# Patient Record
Sex: FEMALE | Race: WHITE | Employment: OTHER | URBAN - METROPOLITAN AREA
[De-identification: names, ages, dates, MRNs, and addresses within clinical notes are randomized per-mention and may not be internally consistent; named-entity substitution may affect disease eponyms.]

---

## 2018-10-12 DIAGNOSIS — Z12.11 SPECIAL SCREENING FOR MALIGNANT NEOPLASMS, COLON: Primary | ICD-10-CM

## 2018-10-16 ENCOUNTER — HOSPITAL ENCOUNTER (OUTPATIENT)
Age: 63
Discharge: HOME | End: 2018-10-16
Payer: COMMERCIAL

## 2018-10-16 LAB
ALBUMIN SERPL-MCNC: 4.2 G/DL (ref 3.5–5.2)
ALP BLD-CCNC: 73 U/L (ref 35–104)
ALT SERPL-CCNC: 18 U/L (ref 0–32)
ANION GAP SERPL CALCULATED.3IONS-SCNC: 11 MMOL/L (ref 7–16)
AST SERPL-CCNC: 19 U/L (ref 0–31)
BASOPHILS ABSOLUTE: 0.04 E9/L (ref 0–0.2)
BASOPHILS RELATIVE PERCENT: 0.4 % (ref 0–2)
BILIRUB SERPL-MCNC: 0.3 MG/DL (ref 0–1.2)
BUN BLDV-MCNC: 20 MG/DL (ref 8–23)
CALCIUM SERPL-MCNC: 9.3 MG/DL (ref 8.6–10.2)
CHLORIDE BLD-SCNC: 104 MMOL/L (ref 98–107)
CHOLESTEROL, TOTAL: 202 MG/DL (ref 0–199)
CO2: 26 MMOL/L (ref 22–29)
CREAT SERPL-MCNC: 0.9 MG/DL (ref 0.5–1)
EOSINOPHILS ABSOLUTE: 0.12 E9/L (ref 0.05–0.5)
EOSINOPHILS RELATIVE PERCENT: 1.3 % (ref 0–6)
GFR AFRICAN AMERICAN: >60
GFR NON-AFRICAN AMERICAN: >60 ML/MIN/1.73
GLUCOSE BLD-MCNC: 93 MG/DL (ref 74–109)
HCT VFR BLD CALC: 39.7 % (ref 34–48)
HDLC SERPL-MCNC: 46 MG/DL
HEMOGLOBIN: 13.3 G/DL (ref 11.5–15.5)
IMMATURE GRANULOCYTES #: 0.04 E9/L
IMMATURE GRANULOCYTES %: 0.4 % (ref 0–5)
LDL CHOLESTEROL CALCULATED: 137 MG/DL (ref 0–99)
LYMPHOCYTES ABSOLUTE: 1.55 E9/L (ref 1.5–4)
LYMPHOCYTES RELATIVE PERCENT: 17.2 % (ref 20–42)
MCH RBC QN AUTO: 30.6 PG (ref 26–35)
MCHC RBC AUTO-ENTMCNC: 33.5 % (ref 32–34.5)
MCV RBC AUTO: 91.5 FL (ref 80–99.9)
MONOCYTES ABSOLUTE: 0.68 E9/L (ref 0.1–0.95)
MONOCYTES RELATIVE PERCENT: 7.6 % (ref 2–12)
NEUTROPHILS ABSOLUTE: 6.56 E9/L (ref 1.8–7.3)
NEUTROPHILS RELATIVE PERCENT: 73.1 % (ref 43–80)
PDW BLD-RTO: 12.9 FL (ref 11.5–15)
PLATELET # BLD: 248 E9/L (ref 130–450)
PMV BLD AUTO: 9.7 FL (ref 7–12)
POTASSIUM SERPL-SCNC: 5.1 MMOL/L (ref 3.5–5)
RBC # BLD: 4.34 E12/L (ref 3.5–5.5)
SODIUM BLD-SCNC: 141 MMOL/L (ref 132–146)
TOTAL PROTEIN: 6.5 G/DL (ref 6.4–8.3)
TRIGL SERPL-MCNC: 93 MG/DL (ref 0–149)
TSH SERPL DL<=0.05 MIU/L-ACNC: 2.83 UIU/ML (ref 0.27–4.2)
VLDLC SERPL CALC-MCNC: 19 MG/DL
WBC # BLD: 9 E9/L (ref 4.5–11.5)

## 2018-10-16 PROCEDURE — 80061 LIPID PANEL: CPT

## 2018-10-16 PROCEDURE — 80053 COMPREHEN METABOLIC PANEL: CPT

## 2018-10-16 PROCEDURE — 84443 ASSAY THYROID STIM HORMONE: CPT

## 2018-10-16 PROCEDURE — 85025 COMPLETE CBC W/AUTO DIFF WBC: CPT

## 2018-10-16 PROCEDURE — 36415 COLL VENOUS BLD VENIPUNCTURE: CPT

## 2018-10-22 ENCOUNTER — PATIENT MESSAGE (OUTPATIENT)
Dept: FAMILY MEDICINE CLINIC | Age: 63
End: 2018-10-22

## 2018-10-22 DIAGNOSIS — Z12.11 SPECIAL SCREENING FOR MALIGNANT NEOPLASMS, COLON: ICD-10-CM

## 2018-10-22 LAB
CONTROL: NORMAL
HEMOCCULT STL QL: POSITIVE

## 2018-10-22 PROCEDURE — 82274 ASSAY TEST FOR BLOOD FECAL: CPT | Performed by: FAMILY MEDICINE

## 2018-10-23 NOTE — TELEPHONE ENCOUNTER
From: Suzie Goodrich  To: Rosalind Padilla DO  Sent: 10/22/2018 9:01 PM EDT  Subject: Test Results Question    Hello team,   I viewed the \"positive\"result of my FIT-stool test. The last two years were negative. However, I have IBS with constipation. Last weekend, 10/13/18, I had a \"bout\" with diarrhea followed by 4-5 days of constipation. I had to push like crazy to collect a sample for this test. I have a big hemorrhoid. Is it possible that my condition could have caused this result? What should I do next?  Thanks, Suzie Goodrich

## 2018-10-31 DIAGNOSIS — Z12.11 SPECIAL SCREENING FOR MALIGNANT NEOPLASMS, COLON: Primary | ICD-10-CM

## 2018-11-07 DIAGNOSIS — Z12.11 SPECIAL SCREENING FOR MALIGNANT NEOPLASMS, COLON: ICD-10-CM

## 2018-11-07 LAB
CONTROL: NORMAL
HEMOCCULT STL QL: NEGATIVE

## 2018-11-07 PROCEDURE — 82274 ASSAY TEST FOR BLOOD FECAL: CPT | Performed by: FAMILY MEDICINE

## 2018-11-21 ENCOUNTER — OFFICE VISIT (OUTPATIENT)
Dept: FAMILY MEDICINE CLINIC | Age: 63
End: 2018-11-21
Payer: COMMERCIAL

## 2018-11-21 VITALS
HEART RATE: 66 BPM | HEIGHT: 62 IN | DIASTOLIC BLOOD PRESSURE: 70 MMHG | SYSTOLIC BLOOD PRESSURE: 104 MMHG | BODY MASS INDEX: 21.9 KG/M2 | TEMPERATURE: 98.8 F | RESPIRATION RATE: 16 BRPM | OXYGEN SATURATION: 98 % | WEIGHT: 119 LBS

## 2018-11-21 DIAGNOSIS — M54.50 ACUTE BILATERAL LOW BACK PAIN WITHOUT SCIATICA: Primary | ICD-10-CM

## 2018-11-21 DIAGNOSIS — Z23 NEED FOR VACCINATION FOR ZOSTER: ICD-10-CM

## 2018-11-21 PROCEDURE — 99214 OFFICE O/P EST MOD 30 MIN: CPT | Performed by: FAMILY MEDICINE

## 2018-11-21 RX ORDER — TRIAMCINOLONE ACETONIDE 40 MG/ML
40 INJECTION, SUSPENSION INTRA-ARTICULAR; INTRAMUSCULAR ONCE
Status: COMPLETED | OUTPATIENT
Start: 2018-11-21 | End: 2018-11-21

## 2018-11-21 RX ORDER — KETOROLAC TROMETHAMINE 10 MG/1
10 TABLET, FILM COATED ORAL EVERY 6 HOURS PRN
Qty: 20 TABLET | Refills: 0 | Status: SHIPPED | OUTPATIENT
Start: 2018-11-21 | End: 2020-11-09

## 2018-11-21 RX ORDER — TIZANIDINE 2 MG/1
2 TABLET ORAL NIGHTLY PRN
Qty: 30 TABLET | Refills: 0 | Status: SHIPPED | OUTPATIENT
Start: 2018-11-21 | End: 2020-11-09

## 2018-11-21 RX ADMIN — TRIAMCINOLONE ACETONIDE 40 MG: 40 INJECTION, SUSPENSION INTRA-ARTICULAR; INTRAMUSCULAR at 10:07

## 2018-11-21 ASSESSMENT — PATIENT HEALTH QUESTIONNAIRE - PHQ9
SUM OF ALL RESPONSES TO PHQ QUESTIONS 1-9: 0
1. LITTLE INTEREST OR PLEASURE IN DOING THINGS: 0
SUM OF ALL RESPONSES TO PHQ9 QUESTIONS 1 & 2: 0
SUM OF ALL RESPONSES TO PHQ QUESTIONS 1-9: 0
2. FEELING DOWN, DEPRESSED OR HOPELESS: 0

## 2019-10-18 ENCOUNTER — HOSPITAL ENCOUNTER (OUTPATIENT)
Age: 64
Discharge: HOME | End: 2019-10-18
Payer: COMMERCIAL

## 2019-10-18 LAB
ALBUMIN SERPL-MCNC: 4.7 G/DL (ref 3.5–5.2)
ALP BLD-CCNC: 80 U/L (ref 35–104)
ALT SERPL-CCNC: 11 U/L (ref 0–32)
ANION GAP SERPL CALCULATED.3IONS-SCNC: 11 MMOL/L (ref 7–16)
AST SERPL-CCNC: 18 U/L (ref 0–31)
BASOPHILS ABSOLUTE: 0.04 E9/L (ref 0–0.2)
BASOPHILS RELATIVE PERCENT: 0.8 % (ref 0–2)
BILIRUB SERPL-MCNC: 0.4 MG/DL (ref 0–1.2)
BUN BLDV-MCNC: 24 MG/DL (ref 8–23)
CALCIUM SERPL-MCNC: 10.1 MG/DL (ref 8.6–10.2)
CHLORIDE BLD-SCNC: 104 MMOL/L (ref 98–107)
CHOLESTEROL, TOTAL: 224 MG/DL (ref 0–199)
CO2: 26 MMOL/L (ref 22–29)
CREAT SERPL-MCNC: 0.9 MG/DL (ref 0.5–1)
EOSINOPHILS ABSOLUTE: 0.08 E9/L (ref 0.05–0.5)
EOSINOPHILS RELATIVE PERCENT: 1.5 % (ref 0–6)
GFR AFRICAN AMERICAN: >60
GFR NON-AFRICAN AMERICAN: >60 ML/MIN/1.73
GLUCOSE BLD-MCNC: 93 MG/DL (ref 74–99)
HCT VFR BLD CALC: 42.5 % (ref 34–48)
HDLC SERPL-MCNC: 50 MG/DL
HEMOGLOBIN: 14.2 G/DL (ref 11.5–15.5)
IMMATURE GRANULOCYTES #: 0.01 E9/L
IMMATURE GRANULOCYTES %: 0.2 % (ref 0–5)
LDL CHOLESTEROL CALCULATED: 158 MG/DL (ref 0–99)
LYMPHOCYTES ABSOLUTE: 1.27 E9/L (ref 1.5–4)
LYMPHOCYTES RELATIVE PERCENT: 24.3 % (ref 20–42)
MCH RBC QN AUTO: 30.5 PG (ref 26–35)
MCHC RBC AUTO-ENTMCNC: 33.4 % (ref 32–34.5)
MCV RBC AUTO: 91.4 FL (ref 80–99.9)
MONOCYTES ABSOLUTE: 0.45 E9/L (ref 0.1–0.95)
MONOCYTES RELATIVE PERCENT: 8.6 % (ref 2–12)
NEUTROPHILS ABSOLUTE: 3.37 E9/L (ref 1.8–7.3)
NEUTROPHILS RELATIVE PERCENT: 64.6 % (ref 43–80)
PDW BLD-RTO: 12.8 FL (ref 11.5–15)
PLATELET # BLD: 286 E9/L (ref 130–450)
PMV BLD AUTO: 9.3 FL (ref 7–12)
POTASSIUM SERPL-SCNC: 4.5 MMOL/L (ref 3.5–5)
RBC # BLD: 4.65 E12/L (ref 3.5–5.5)
SODIUM BLD-SCNC: 141 MMOL/L (ref 132–146)
TOTAL PROTEIN: 7.5 G/DL (ref 6.4–8.3)
TRIGL SERPL-MCNC: 82 MG/DL (ref 0–149)
TSH SERPL DL<=0.05 MIU/L-ACNC: 1.99 UIU/ML (ref 0.27–4.2)
VLDLC SERPL CALC-MCNC: 16 MG/DL
WBC # BLD: 5.2 E9/L (ref 4.5–11.5)

## 2019-10-18 PROCEDURE — 36415 COLL VENOUS BLD VENIPUNCTURE: CPT

## 2019-10-18 PROCEDURE — 85025 COMPLETE CBC W/AUTO DIFF WBC: CPT

## 2019-10-18 PROCEDURE — 84443 ASSAY THYROID STIM HORMONE: CPT

## 2019-10-18 PROCEDURE — 80053 COMPREHEN METABOLIC PANEL: CPT

## 2019-10-18 PROCEDURE — 80061 LIPID PANEL: CPT

## 2019-11-13 ENCOUNTER — OFFICE VISIT (OUTPATIENT)
Dept: FAMILY MEDICINE CLINIC | Age: 64
End: 2019-11-13
Payer: COMMERCIAL

## 2019-11-13 VITALS
BODY MASS INDEX: 20.98 KG/M2 | OXYGEN SATURATION: 99 % | WEIGHT: 114 LBS | SYSTOLIC BLOOD PRESSURE: 114 MMHG | HEIGHT: 62 IN | TEMPERATURE: 98.4 F | RESPIRATION RATE: 14 BRPM | HEART RATE: 82 BPM | DIASTOLIC BLOOD PRESSURE: 70 MMHG

## 2019-11-13 DIAGNOSIS — R53.83 FATIGUE, UNSPECIFIED TYPE: Primary | ICD-10-CM

## 2019-11-13 DIAGNOSIS — F41.9 ANXIETY: ICD-10-CM

## 2019-11-13 DIAGNOSIS — Z12.11 SPECIAL SCREENING FOR MALIGNANT NEOPLASMS, COLON: ICD-10-CM

## 2019-11-13 PROCEDURE — 99214 OFFICE O/P EST MOD 30 MIN: CPT | Performed by: FAMILY MEDICINE

## 2019-11-15 DIAGNOSIS — Z12.11 SPECIAL SCREENING FOR MALIGNANT NEOPLASMS, COLON: ICD-10-CM

## 2019-11-15 LAB
CONTROL: NORMAL
HEMOCCULT STL QL: NEGATIVE

## 2019-11-15 PROCEDURE — 82274 ASSAY TEST FOR BLOOD FECAL: CPT | Performed by: FAMILY MEDICINE

## 2019-12-30 ENCOUNTER — HOSPITAL ENCOUNTER (OUTPATIENT)
Age: 64
Discharge: HOME | End: 2019-12-30
Payer: COMMERCIAL

## 2019-12-30 DIAGNOSIS — R53.83 FATIGUE, UNSPECIFIED TYPE: ICD-10-CM

## 2019-12-30 LAB
HCT VFR BLD CALC: 38.4 % (ref 34–48)
HEMOGLOBIN: 12.6 G/DL (ref 11.5–15.5)
MCH RBC QN AUTO: 30.4 PG (ref 26–35)
MCHC RBC AUTO-ENTMCNC: 32.8 % (ref 32–34.5)
MCV RBC AUTO: 92.5 FL (ref 80–99.9)
PDW BLD-RTO: 12.9 FL (ref 11.5–15)
PLATELET # BLD: 281 E9/L (ref 130–450)
PMV BLD AUTO: 9.6 FL (ref 7–12)
RBC # BLD: 4.15 E12/L (ref 3.5–5.5)
WBC # BLD: 7.5 E9/L (ref 4.5–11.5)

## 2019-12-30 PROCEDURE — 85027 COMPLETE CBC AUTOMATED: CPT

## 2019-12-30 PROCEDURE — 36415 COLL VENOUS BLD VENIPUNCTURE: CPT

## 2020-10-23 ENCOUNTER — HOSPITAL ENCOUNTER (OUTPATIENT)
Age: 65
Discharge: HOME | End: 2020-10-23
Payer: COMMERCIAL

## 2020-10-23 LAB
ALBUMIN SERPL-MCNC: 4.5 G/DL (ref 3.5–5.2)
ALP BLD-CCNC: 82 U/L (ref 35–104)
ALT SERPL-CCNC: 11 U/L (ref 0–32)
ANION GAP SERPL CALCULATED.3IONS-SCNC: 11 MMOL/L (ref 7–16)
AST SERPL-CCNC: 15 U/L (ref 0–31)
BASOPHILS ABSOLUTE: 0.04 E9/L (ref 0–0.2)
BASOPHILS RELATIVE PERCENT: 0.7 % (ref 0–2)
BILIRUB SERPL-MCNC: 0.6 MG/DL (ref 0–1.2)
BUN BLDV-MCNC: 21 MG/DL (ref 8–23)
CALCIUM SERPL-MCNC: 9.9 MG/DL (ref 8.6–10.2)
CHLORIDE BLD-SCNC: 104 MMOL/L (ref 98–107)
CHOLESTEROL, TOTAL: 218 MG/DL (ref 0–199)
CO2: 25 MMOL/L (ref 22–29)
CREAT SERPL-MCNC: 1 MG/DL (ref 0.5–1)
EOSINOPHILS ABSOLUTE: 0.04 E9/L (ref 0.05–0.5)
EOSINOPHILS RELATIVE PERCENT: 0.7 % (ref 0–6)
GFR AFRICAN AMERICAN: >60
GFR NON-AFRICAN AMERICAN: 56 ML/MIN/1.73
GLUCOSE BLD-MCNC: 95 MG/DL (ref 74–99)
HCT VFR BLD CALC: 41.9 % (ref 34–48)
HDLC SERPL-MCNC: 45 MG/DL
HEMOGLOBIN: 14 G/DL (ref 11.5–15.5)
IMMATURE GRANULOCYTES #: 0.01 E9/L
IMMATURE GRANULOCYTES %: 0.2 % (ref 0–5)
LDL CHOLESTEROL CALCULATED: 155 MG/DL (ref 0–99)
LYMPHOCYTES ABSOLUTE: 1.37 E9/L (ref 1.5–4)
LYMPHOCYTES RELATIVE PERCENT: 23.7 % (ref 20–42)
MCH RBC QN AUTO: 31.1 PG (ref 26–35)
MCHC RBC AUTO-ENTMCNC: 33.4 % (ref 32–34.5)
MCV RBC AUTO: 93.1 FL (ref 80–99.9)
MONOCYTES ABSOLUTE: 0.46 E9/L (ref 0.1–0.95)
MONOCYTES RELATIVE PERCENT: 8 % (ref 2–12)
NEUTROPHILS ABSOLUTE: 3.86 E9/L (ref 1.8–7.3)
NEUTROPHILS RELATIVE PERCENT: 66.7 % (ref 43–80)
PDW BLD-RTO: 13.5 FL (ref 11.5–15)
PLATELET # BLD: 287 E9/L (ref 130–450)
PMV BLD AUTO: 9.4 FL (ref 7–12)
POTASSIUM SERPL-SCNC: 4.4 MMOL/L (ref 3.5–5)
RBC # BLD: 4.5 E12/L (ref 3.5–5.5)
SODIUM BLD-SCNC: 140 MMOL/L (ref 132–146)
TOTAL PROTEIN: 7.2 G/DL (ref 6.4–8.3)
TRIGL SERPL-MCNC: 88 MG/DL (ref 0–149)
TSH SERPL DL<=0.05 MIU/L-ACNC: 2.06 UIU/ML (ref 0.27–4.2)
VLDLC SERPL CALC-MCNC: 18 MG/DL
WBC # BLD: 5.8 E9/L (ref 4.5–11.5)

## 2020-10-23 PROCEDURE — 85025 COMPLETE CBC W/AUTO DIFF WBC: CPT

## 2020-10-23 PROCEDURE — 84443 ASSAY THYROID STIM HORMONE: CPT

## 2020-10-23 PROCEDURE — 36415 COLL VENOUS BLD VENIPUNCTURE: CPT

## 2020-10-23 PROCEDURE — 80061 LIPID PANEL: CPT

## 2020-10-23 PROCEDURE — 80053 COMPREHEN METABOLIC PANEL: CPT

## 2020-11-09 ENCOUNTER — OFFICE VISIT (OUTPATIENT)
Dept: FAMILY MEDICINE CLINIC | Age: 65
End: 2020-11-09
Payer: COMMERCIAL

## 2020-11-09 VITALS
RESPIRATION RATE: 16 BRPM | DIASTOLIC BLOOD PRESSURE: 78 MMHG | HEART RATE: 74 BPM | OXYGEN SATURATION: 100 % | TEMPERATURE: 97.6 F | BODY MASS INDEX: 19.67 KG/M2 | WEIGHT: 111 LBS | SYSTOLIC BLOOD PRESSURE: 121 MMHG | HEIGHT: 63 IN

## 2020-11-09 PROCEDURE — 93000 ELECTROCARDIOGRAM COMPLETE: CPT | Performed by: FAMILY MEDICINE

## 2020-11-09 PROCEDURE — 99396 PREV VISIT EST AGE 40-64: CPT | Performed by: FAMILY MEDICINE

## 2020-11-09 SDOH — ECONOMIC STABILITY: FOOD INSECURITY: WITHIN THE PAST 12 MONTHS, YOU WORRIED THAT YOUR FOOD WOULD RUN OUT BEFORE YOU GOT MONEY TO BUY MORE.: NEVER TRUE

## 2020-11-09 SDOH — ECONOMIC STABILITY: FOOD INSECURITY: WITHIN THE PAST 12 MONTHS, THE FOOD YOU BOUGHT JUST DIDN'T LAST AND YOU DIDN'T HAVE MONEY TO GET MORE.: NEVER TRUE

## 2020-11-09 SDOH — ECONOMIC STABILITY: INCOME INSECURITY: HOW HARD IS IT FOR YOU TO PAY FOR THE VERY BASICS LIKE FOOD, HOUSING, MEDICAL CARE, AND HEATING?: NOT HARD AT ALL

## 2020-11-09 SDOH — ECONOMIC STABILITY: TRANSPORTATION INSECURITY
IN THE PAST 12 MONTHS, HAS LACK OF TRANSPORTATION KEPT YOU FROM MEETINGS, WORK, OR FROM GETTING THINGS NEEDED FOR DAILY LIVING?: NO

## 2020-11-09 SDOH — ECONOMIC STABILITY: TRANSPORTATION INSECURITY
IN THE PAST 12 MONTHS, HAS THE LACK OF TRANSPORTATION KEPT YOU FROM MEDICAL APPOINTMENTS OR FROM GETTING MEDICATIONS?: NO

## 2020-11-09 NOTE — PROGRESS NOTES
11/11/2020    Chief Complaint   Patient presents with    Annual Exam     wants to talk about her anxiety and panic attacks that were added in 2012. Screening Questions:    Exercise 30 minutes daily 5 times weekly:  Yes   Mammogram every 1-2 years age 36, then annually after age 48: Yes   Pap smear UTD:  Yes   Personal or family history breast, ovarian or colon cancer: Yes    Abuse/Intimate partner violence:  No   Screened for osteoporosis if 65+:  due    Specialists:  No    CIBH:  No   Fecal occult blood yearly after age 50/Colonoscopy:  Yes   Eye exam every 2-4 years, yearly if diabetic:  Yes    Dental exam:  Yes    Hearing Evaluation/Hearing Aid:  No    BMI elevated: Body mass index is 19.66 kg/m². Lu Whitley Counseled on weight loss   Depression:  Yes    Tobacco use: No  . Cessation discussed    Alcohol use:  No        Immunizations:    Immunization status:     Tetanus shot every 10 years    Flu shot yearly    Pneumovax shot once for high risk and everyone > 65    Shingles shot once for everyone > 60    Hepatitis B for high risk patients    Labs:  No results found for: EAG  Lab Results   Component Value Date    LDLCALC 155 (H) 10/23/2020      CBC:   Lab Results   Component Value Date    WBC 5.8 10/23/2020    RBC 4.50 10/23/2020    HGB 14.0 10/23/2020    HCT 41.9 10/23/2020    MCV 93.1 10/23/2020    MCH 31.1 10/23/2020    MCHC 33.4 10/23/2020    RDW 13.5 10/23/2020     10/23/2020    MPV 9.4 10/23/2020         Patient's past medical, surgical, social and/or family history reviewed, updated in chart, and are non-contributory (unless otherwise stated). Medications and allergies also reviewed and updated in chart.     ROS  Review of Systems - General ROS: negative for - chills, fatigue, fever, night sweats, sleep disturbance, weight gain or weight loss  Psychological ROS: negative for - anxiety, behavioral disorder, depression, hallucinations, irritability, memory difficulties, mood swings, sleep disturbances or suicidal ideation  ENT ROS: negative for - epistaxis, headaches, hearing change, nasal congestion, nasal discharge, nasal polyps, sinus pain, tinnitus, vertigo or visual changes  Hematological and Lymphatic ROS: negative for - bleeding problems, blood clots, fatigue or swollen lymph nodes  Respiratory ROS: negative for - cough, orthopnea, shortness of breath, sputum changes, tachypnea or wheezing  Cardiovascular ROS: negative for - chest pain, dyspnea on exertion, irregular heartbeat, loss of consciousness, palpitations, paroxysmal nocturnal dyspnea or rapid heart rate  Gastrointestinal ROS: negative for - abdominal pain, blood in stools, change in bowel habits, constipation, diarrhea, gas/bloating, heartburn or nausea/vomiting  Musculoskeletal ROS: negative for - joint pain, joint stiffness, joint swelling or muscle, back pain, bowel or bladder incontinence  Neurological ROS: negative for - behavioral changes, confusion, dizziness, headaches, memory loss, numbness/tingling, seizures or speech problems, weakness  Dermatological ROS: negative for - dry skin, mole changes, nail changes, pruritus, rash or skin lesion changes    Physical Exam  /78   Pulse 74   Temp 97.6 °F (36.4 °C) (Temporal)   Resp 16   Ht 5' 3\" (1.6 m)   Wt 111 lb (50.3 kg)   SpO2 100%   BMI 19.66 kg/m²   Wt Readings from Last 3 Encounters:   11/09/20 111 lb (50.3 kg)   11/13/19 114 lb (51.7 kg)   11/21/18 119 lb (54 kg)     Temp Readings from Last 3 Encounters:   11/09/20 97.6 °F (36.4 °C) (Temporal)   11/13/19 98.4 °F (36.9 °C)   11/21/18 98.8 °F (37.1 °C)     BP Readings from Last 3 Encounters:   11/09/20 121/78   11/13/19 114/70   11/21/18 104/70     Pulse Readings from Last 3 Encounters:   11/09/20 74   11/13/19 82   11/21/18 66       General appearance: alert, well appearing, and in no distress, oriented to person, place, and time and normal appearing weight.     CVS exam: normal rate, regular rhythm, normal S1, S2, no murmurs, rubs, clicks or gallops. Radial pulses 2+ bilateral.  PT/DP pulse 2+ bilat. No C/C/E    Chest: clear to auscultation, no wheezes, rales or rhonchi, symmetric air entry. Abdomen: Soft, non-tender, non-distended, positive BS in all 4 quadrants    Extremities:Dorsalis pedis pulses palpated bilaterally, no clubbing, cyanosis, edema or erythema, Sensory exam of the foot is normal, tested with the monofilament. Good pulses, no lesions or ulcers, good peripheral pulses. SKIN: no lesions, jaundice, petechiae, pallor, cyanosis, ecchymosis    NEURO: gross motor exam normal by observation, gait normal    Mental status - alert, oriented to person, place, and time, normal mood, behavior, speech, dress, motor activity, and thought processes      Assessment/Plan  Elida Aquino was seen today for annual exam.    Diagnoses and all orders for this visit:    Annual physical exam  -     EKG 12 Lead    Screening for malignant neoplasm of colon  -     POCT Fit Test; Future    Mixed hyperlipidemia  -     EKG 12 Lead        Return in about 1 year (around 11/9/2021), or if symptoms worsen or fail to improve. The 10-year ASCVD risk score (Dian He., et al., 2013) is: 4.7%    Values used to calculate the score:      Age: 63 years      Sex: Female      Is Non- : No      Diabetic: No      Tobacco smoker: No      Systolic Blood Pressure: 683 mmHg      Is BP treated: No      HDL Cholesterol: 45 mg/dL      Total Cholesterol: 218 mg/dL  Call or go to ED immediately if symptoms worsen or persist.    Educational materials and/or home exercises printed for patient's review and were included in patient instructions on his/her After Visit Summary and given to patient at the end of visit. Counseled regarding above diagnosis, including possible risks and complications,  especially if left uncontrolled.     Counseled regarding the possible side effects, risks, benefits and alternatives to treatment; patient and/or guardian verbalizes understanding, agrees, feels comfortable with and wishes to proceed with above treatment plan. Advised patient to call with any new medication issues, and read all Rx info from pharmacy to assure aware of all possible risks and side effects of medication before taking. Reviewed age and gender appropriate health screening exams and vaccinations. Advised patient regarding importance of keeping up with recommended health maintenance and to schedule as soon as possible if overdue, as this is important in assessing for undiagnosed pathology, especially cancer, as well as protecting against potentially harmful/life threatening disease. Patient and/or guardian verbalizes understanding and agrees with above counseling, assessment and plan. All questions answered.

## 2020-11-11 LAB
CONTROL: NORMAL
HEMOCCULT STL QL: POSITIVE

## 2020-11-11 PROCEDURE — 82274 ASSAY TEST FOR BLOOD FECAL: CPT | Performed by: FAMILY MEDICINE

## 2021-03-05 ENCOUNTER — APPOINTMENT (OUTPATIENT)
Dept: GENERAL RADIOLOGY | Age: 66
End: 2021-03-05
Payer: COMMERCIAL

## 2021-03-05 ENCOUNTER — TELEPHONE (OUTPATIENT)
Dept: ADMINISTRATIVE | Age: 66
End: 2021-03-05

## 2021-03-05 ENCOUNTER — HOSPITAL ENCOUNTER (EMERGENCY)
Age: 66
Discharge: HOME | End: 2021-03-05
Attending: EMERGENCY MEDICINE
Payer: COMMERCIAL

## 2021-03-05 VITALS
HEIGHT: 63 IN | DIASTOLIC BLOOD PRESSURE: 70 MMHG | HEART RATE: 97 BPM | BODY MASS INDEX: 19.67 KG/M2 | SYSTOLIC BLOOD PRESSURE: 142 MMHG | WEIGHT: 111 LBS | RESPIRATION RATE: 16 BRPM | TEMPERATURE: 96.9 F

## 2021-03-05 DIAGNOSIS — S52.502A CLOSED FRACTURE OF DISTAL END OF LEFT RADIUS, UNSPECIFIED FRACTURE MORPHOLOGY, INITIAL ENCOUNTER: Primary | ICD-10-CM

## 2021-03-05 DIAGNOSIS — S82.832A CLOSED FRACTURE OF PROXIMAL END OF LEFT FIBULA, UNSPECIFIED FRACTURE MORPHOLOGY, INITIAL ENCOUNTER: ICD-10-CM

## 2021-03-05 PROCEDURE — 73610 X-RAY EXAM OF ANKLE: CPT

## 2021-03-05 PROCEDURE — 73110 X-RAY EXAM OF WRIST: CPT

## 2021-03-05 PROCEDURE — 99283 EMERGENCY DEPT VISIT LOW MDM: CPT

## 2021-03-05 PROCEDURE — 29125 APPL SHORT ARM SPLINT STATIC: CPT

## 2021-03-05 PROCEDURE — 73560 X-RAY EXAM OF KNEE 1 OR 2: CPT

## 2021-03-05 RX ORDER — HYDROCODONE BITARTRATE AND ACETAMINOPHEN 5; 325 MG/1; MG/1
1 TABLET ORAL EVERY 6 HOURS PRN
Qty: 12 TABLET | Refills: 0 | Status: SHIPPED | OUTPATIENT
Start: 2021-03-05 | End: 2021-03-08

## 2021-03-05 ASSESSMENT — PAIN DESCRIPTION - ONSET: ONSET: SUDDEN

## 2021-03-05 ASSESSMENT — PAIN DESCRIPTION - PAIN TYPE: TYPE: ACUTE PAIN

## 2021-03-05 ASSESSMENT — PAIN SCALES - GENERAL: PAINLEVEL_OUTOF10: 5

## 2021-03-05 NOTE — TELEPHONE ENCOUNTER
called trying to get an appt. Virgilio Robertson missed the last step and fell yesterday, injured her wrist and is now having muscular issues with the calf to the ankle. There are only [facility] appt available next week, and they need in office only.  Please return the call to patient [phone]

## 2021-03-05 NOTE — ED PROVIDER NOTES
61-year-old female presenting with a fall that occurred at home. This was yesterday, she missed the last step going downstairs. She fell and has pain to the left wrist, left knee, bilateral ankles. Did not hit her head or lose consciousness. Is awake, alert, oriented x4 currently. Not in any distress now. Is able to walk on her ankles and her knee though she does feel the pain to the knee and the left wrist.  This was a sudden fall, is been persistent achiness since yesterday, mild severity, 1 day duration, associated with a fall down the step. Family History   Problem Relation Age of Onset    High Cholesterol Mother     Cancer Maternal Uncle         colon    Diabetes Maternal Grandmother     High Blood Pressure Maternal Grandmother     Cancer Maternal Uncle         colon     Past Surgical History:   Procedure Laterality Date    CATARACT REMOVAL  07/17/11    also had scar tissue removed at this time from prev retinal surgeries    LEEP      TONSILLECTOMY AND ADENOIDECTOMY         Review of Systems   Constitutional: Negative for chills and fever. Musculoskeletal:        Left knee pain, left wrist pain, bilateral ankle pain   All other systems reviewed and are negative. Physical Exam  Constitutional:       General: She is not in acute distress. Appearance: She is well-developed. HENT:      Head: Normocephalic and atraumatic. Eyes:      Conjunctiva/sclera: Conjunctivae normal.      Pupils: Pupils are equal, round, and reactive to light. Neck:      Musculoskeletal: Normal range of motion. Thyroid: No thyromegaly. Cardiovascular:      Rate and Rhythm: Normal rate and regular rhythm. Pulmonary:      Effort: Pulmonary effort is normal. No respiratory distress. Breath sounds: Normal breath sounds. Abdominal:      General: There is no distension. Palpations: Abdomen is soft. Tenderness: There is no abdominal tenderness. There is no guarding or rebound. ankle at this time. RECOMMENDATION:   In the setting of trauma, if there is persistent symptoms and physical exam   warrants a repeat radiograph in 10-14 days could be considered as occult   fractures may not be evident on initial imaging evaluation. XR ANKLE RIGHT (MIN 3 VIEWS)   Final Result   Age-indeterminate small chip fracture along the dorsal aspect of the anterior   talus. ------------------------- NURSING NOTES AND VITALS REVIEWED ---------------------------  Date / Time Roomed:  3/5/2021  2:15 PM  ED Bed Assignment:  15/15    The nursing notes within the ED encounter and vital signs as below have been reviewed. BP (!) 142/70   Pulse 97   Temp 96.9 °F (36.1 °C) (Infrared)   Resp 16   Ht 5' 3\" (1.6 m)   Wt 111 lb (50.3 kg)   BMI 19.66 kg/m²   Oxygen Saturation Interpretation: Normal      ------------------------------------------ PROGRESS NOTES ------------------------------------------  I have spoken with the patient and discussed todays results, in addition to providing specific details for the plan of care and counseling regarding the diagnosis and prognosis. Their questions are answered at this time and they are agreeable with the plan. I discussed at length with them reasons for immediate return here for re evaluation. They will followup with primary care by calling their office tomorrow. Patient updated with the results of her x-rays. We included the possible prior avulsion fracture, undetermined time, on the right ankle as well. Patient has no significant tenderness or pain there. She understands to follow-up with orthopedic doctor. Her  is previously is Dr. Angelique Frankel and so she will be followed up by Dr. Angelique Frankel. They can use any orthopedic surgeon they wish. Currently awake calm alert, oriented, no acute distress.   Able ambulate with the crutches and the immobilizer and splint.  --------------------------------- ADDITIONAL PROVIDER NOTES ---------------------------------  At this time the patient is without objective evidence of an acute process requiring hospitalization or inpatient management. They have remained hemodynamically stable throughout their entire ED visit and are stable for discharge with outpatient follow-up. The plan has been discussed in detail and they are aware of the specific conditions for emergent return, as well as the importance of follow-up. New Prescriptions    HYDROCODONE-ACETAMINOPHEN (NORCO) 5-325 MG PER TABLET    Take 1 tablet by mouth every 6 hours as needed for Pain for up to 3 days. Diagnosis:  1. Closed fracture of distal end of left radius, unspecified fracture morphology, initial encounter    2. Closed fracture of proximal end of left fibula, unspecified fracture morphology, initial encounter        Disposition:  Patient's disposition: Discharge to home  Patient's condition is stable.               Christiane Townsend DO  03/05/21 1546

## 2021-03-09 ENCOUNTER — OFFICE VISIT (OUTPATIENT)
Dept: FAMILY MEDICINE CLINIC | Age: 66
End: 2021-03-09
Payer: COMMERCIAL

## 2021-03-09 VITALS
OXYGEN SATURATION: 99 % | DIASTOLIC BLOOD PRESSURE: 64 MMHG | RESPIRATION RATE: 16 BRPM | TEMPERATURE: 97.6 F | WEIGHT: 119 LBS | HEIGHT: 63 IN | HEART RATE: 91 BPM | SYSTOLIC BLOOD PRESSURE: 100 MMHG | BODY MASS INDEX: 21.09 KG/M2

## 2021-03-09 DIAGNOSIS — K58.2 IRRITABLE BOWEL SYNDROME WITH BOTH CONSTIPATION AND DIARRHEA: ICD-10-CM

## 2021-03-09 DIAGNOSIS — S62.102A CLOSED FRACTURE OF LEFT WRIST, INITIAL ENCOUNTER: ICD-10-CM

## 2021-03-09 DIAGNOSIS — S82.832A CLOSED FRACTURE OF PROXIMAL END OF LEFT FIBULA, UNSPECIFIED FRACTURE MORPHOLOGY, INITIAL ENCOUNTER: Primary | ICD-10-CM

## 2021-03-09 PROCEDURE — 99214 OFFICE O/P EST MOD 30 MIN: CPT | Performed by: FAMILY MEDICINE

## 2021-03-09 RX ORDER — HYOSCYAMINE SULFATE 0.12 MG/1
1 TABLET SUBLINGUAL EVERY 4 HOURS
Qty: 120 EACH | Refills: 0 | Status: SHIPPED | OUTPATIENT
Start: 2021-03-09

## 2021-03-09 NOTE — PROGRESS NOTES
3/9/2021    Chief Complaint   Patient presents with    Fall     ER follow up fractures        HPI    Wood Guerrero is a 61 y.o. patient that presents today for:    Reida Seller on 3/5/21. ER note reviewed. Wrist and possible fibular fracture. Are is wrapped and brace on LLE. Is only taking APAP for pain. Pain is much improved. Would like to see Dr. Negrito Figueroa. IBS is flaring. SPasm in colon. Does feel like she is in need of help. Levsin has helped in the past.     Patient's past medical, surgical, social and/or family history reviewed, updated in chart, and are non-contributory (unless otherwise stated). Medications and allergies also reviewed and updated in chart. ROS negative unless otherwise specified    Physical Exam  Temp Readings from Last 3 Encounters:   03/09/21 97.6 °F (36.4 °C)   03/05/21 96.9 °F (36.1 °C) (Infrared)   11/09/20 97.6 °F (36.4 °C) (Temporal)     Wt Readings from Last 3 Encounters:   03/09/21 119 lb (54 kg)   03/05/21 111 lb (50.3 kg)   11/09/20 111 lb (50.3 kg)     BP Readings from Last 3 Encounters:   03/09/21 100/64   03/05/21 (!) 142/70   11/09/20 121/78     Pulse Readings from Last 3 Encounters:   03/09/21 91   03/05/21 97   11/09/20 74       General appearance: alert, well appearing, and in no distress, oriented to person, place, and time and normal appearing weight. CVS exam: normal rate, regular rhythm, normal S1, S2, no murmurs, rubs, clicks or gallops. Radial pulses 2+ bilateral.  PT/DP pulse 2+ bilat. No C/C/E    Chest: clear to auscultation, no wheezes, rales or rhonchi, symmetric air entry.      Abdomen: Soft, non-tender, non-distended, positive BS in all 4 quadrants    Extremities:Dorsalis pedis pulses palpated bilaterally, no clubbing, cyanosis, edema or erythema, left arm and leg are wrapped    SKIN: no lesions, jaundice, petechiae, pallor, cyanosis, ecchymosis    NEURO: gross motor exam normal by observation, gait normal    Mental status - alert, oriented to person, place, and time, normal mood, behavior, speech, dress, motor activity, and thought processes      Assessment/Plan  Ingrid Joseph was seen today for fall. Diagnoses and all orders for this visit:    Closed fracture of proximal end of left fibula, unspecified fracture morphology, initial encounter  -     Jono Jacques DO, Orthopaedics, [address]    Closed fracture of left wrist, initial encounter  -     Medardo Ledezma DO, Orthopaedics, [address]    Irritable bowel syndrome with both constipation and diarrhea  -     START: Hyoscyamine Sulfate SL (LEVSIN/SL) 0.125 MG SUBL; Place 1 tablet under the tongue every 4 hours          Return if symptoms worsen or fail to improve. Carrie Dumont DO    Call or go to ED immediately if symptoms worsen or persist.    Educational materials and/or home exercises printed for patient's review and were included in patient instructions on his/her After Visit Summary and given to patient at the end of visit. Counseled regarding above diagnosis, including possible risks and complications,  especially if left uncontrolled. Counseled regarding the possible side effects, risks, benefits and alternatives to treatment; patient and/or guardian verbalizes understanding, agrees, feels comfortable with and wishes to proceed with above treatment plan. Advised patient to call with any new medication issues, and read all Rx info from pharmacy to assure aware of all possible risks and side effects of medication before taking. Reviewed age and gender appropriate health screening exams and vaccinations. Advised patient regarding importance of keeping up with recommended health maintenance and to schedule as soon as possible if overdue, as this is important in assessing for undiagnosed pathology, especially cancer, as well as protecting against potentially harmful/life threatening disease.       Patient and/or guardian verbalizes understanding and agrees with above counseling, assessment and plan. All questions answered.

## 2021-03-12 DIAGNOSIS — S82.832A CLOSED FRACTURE OF PROXIMAL END OF LEFT FIBULA, UNSPECIFIED FRACTURE MORPHOLOGY, INITIAL ENCOUNTER: Primary | ICD-10-CM

## 2021-03-12 DIAGNOSIS — S52.502A CLOSED FRACTURE OF DISTAL END OF LEFT RADIUS, UNSPECIFIED FRACTURE MORPHOLOGY, INITIAL ENCOUNTER: ICD-10-CM

## 2021-03-18 ENCOUNTER — OFFICE VISIT (OUTPATIENT)
Dept: ORTHOPEDIC SURGERY | Age: 66
End: 2021-03-18
Payer: COMMERCIAL

## 2021-03-18 ENCOUNTER — HOSPITAL ENCOUNTER (OUTPATIENT)
Dept: GENERAL RADIOLOGY | Age: 66
Discharge: HOME | End: 2021-03-20
Payer: COMMERCIAL

## 2021-03-18 VITALS — TEMPERATURE: 98.5 F

## 2021-03-18 DIAGNOSIS — S52.502A CLOSED FRACTURE OF DISTAL END OF LEFT RADIUS, UNSPECIFIED FRACTURE MORPHOLOGY, INITIAL ENCOUNTER: ICD-10-CM

## 2021-03-18 DIAGNOSIS — S82.832A CLOSED FRACTURE OF PROXIMAL END OF LEFT FIBULA, UNSPECIFIED FRACTURE MORPHOLOGY, INITIAL ENCOUNTER: ICD-10-CM

## 2021-03-18 DIAGNOSIS — S82.832A CLOSED FRACTURE OF PROXIMAL END OF LEFT FIBULA, UNSPECIFIED FRACTURE MORPHOLOGY, INITIAL ENCOUNTER: Primary | ICD-10-CM

## 2021-03-18 PROCEDURE — 25600 CLTX DST RDL FX/EPHYS SEP WO: CPT | Performed by: ORTHOPAEDIC SURGERY

## 2021-03-18 PROCEDURE — 73110 X-RAY EXAM OF WRIST: CPT

## 2021-03-18 PROCEDURE — 29075 APPL CST ELBW FNGR SHORT ARM: CPT | Performed by: ORTHOPAEDIC SURGERY

## 2021-03-18 PROCEDURE — 27780 TREATMENT OF FIBULA FRACTURE: CPT | Performed by: ORTHOPAEDIC SURGERY

## 2021-03-18 PROCEDURE — 99204 OFFICE O/P NEW MOD 45 MIN: CPT | Performed by: ORTHOPAEDIC SURGERY

## 2021-03-18 PROCEDURE — 73560 X-RAY EXAM OF KNEE 1 OR 2: CPT

## 2021-03-18 PROCEDURE — 99202 OFFICE O/P NEW SF 15 MIN: CPT | Performed by: ORTHOPAEDIC SURGERY

## 2021-03-18 RX ORDER — ACETAMINOPHEN 325 MG/1
650 TABLET ORAL EVERY 6 HOURS PRN
COMMUNITY

## 2021-03-22 NOTE — PROGRESS NOTES
Orthopaedic New Patient Note        Gunjan Griffin is a 61 y.o. female, her YOB: 1955 with the following history as recorded in [facility]:    Patient Active Problem List    Diagnosis Date Noted    MDD (major depressive disorder) 12/06/2012    Retinal detachment with retinal defect 11/29/2011     Current Outpatient Medications   Medication Sig Dispense Refill    acetaminophen (TYLENOL) 325 MG tablet Take 650 mg by mouth every 6 hours as needed for Pain      Hyoscyamine Sulfate SL (LEVSIN/SL) 0.125 MG SUBL Place 1 tablet under the tongue every 4 hours (Patient taking differently: Place 1 tablet under the tongue every 4 hours as needed ) 120 each 0    PARoxetine (PAXIL) 10 MG tablet Take 5 mg by mouth every morning        No current facility-administered medications for this visit. Allergies: Xanax xr [alprazolam er]  Past Medical History:   Diagnosis Date    Abnormal Pap smear of cervix     Anxiety     Detached retina, right 08/17/2010,10/29/10    surgery, Dr. Lyubov Schilling [facility]      Past Surgical History:   Procedure Laterality Date    CATARACT REMOVAL  07/17/11    also had scar tissue removed at this time from prev retinal surgeries    LEEP      TONSILLECTOMY AND ADENOIDECTOMY       Family History   Problem Relation Age of Onset    High Cholesterol Mother     Cancer Maternal Uncle         colon    Diabetes Maternal Grandmother     High Blood Pressure Maternal Grandmother     Cancer Maternal Uncle         colon     Social History     Tobacco Use    Smoking status: Never Smoker    Smokeless tobacco: Never Used   Substance Use Topics    Alcohol use: No                           Review of Systems   Constitutional: Negative for fever and chills. HENT: Negative for ear pain, sore throat and sinus pressure. Eyes: Negative for pain, discharge and redness. Respiratory: Negative for cough, shortness of breath and wheezing. Cardiovascular: Negative for chest pain.    Gastrointestinal: Negative for nausea, vomiting, diarrhea and abdominal distention. Genitourinary: Negative for dysuria and frequency. Musculoskeletal: Negative for back pain and arthralgias. All other systems reviewed and negative. CC: Left wrist and knee pain    S: Patrick Castro is a 61 y.o. who is here today for initial evaluation for her left wrist and left knee. DOI: 3/4/21, JORGE A: States she missed a step and landed on the left side hitting the knee and landing on the wrist.  Was seen in the ER date after injury diagnosed with left distal radius and proximal fibular fractures. She subsequently follow-up with her primary care physician and was referred here for definitive management. Patient has been in an Ace bandage and supportive brace for the knee. She has been able to bear weight through the knee without much discomfort or difficulty while using the brace. She has been provisionally immobilized for the left wrist.  Denies any other regions of pain at this time. Denies any numbness or tingling. Physical Exam  Temp 98.5 °F (36.9 °C) (Oral)   O:   CONSTITUTIONAL: awake, alert, cooperative, no apparent distress, and appears stated age  EYES: Lids and lashes normal, pupils equal, round and reactive to light, extra ocular muscles intact, sclera clear, conjunctiva normal  ENT: Normocephalic, without obvious abnormality, atraumatic, external ears without lesions, oral pharynx with moist mucus membranes  NECK: Trachea midline, skin normal  LUNGS: No increased work of breathing, good air exchange  CARDIOVASCULAR: 2+ pulses throughout and capillary Refill less than 2 seconds  ABDOMEN: soft, non-distended, non-tender and no rebound tenderness or guarding  NEUROLOGIC: Awake, alert, oriented to name, place and time. Cranial nerves II-XII are grossly intact. Motor is 5 out of 5 bilaterally. Sensory is intact.    Left Upper Extremity Exam:  Splint removed, underlying skin is intact  There is ecchymosis throughout the wrist There is increased sclerosis within the head of the fibula, likely   representing healing fracture.  No interval change in alignment.  No   significant soft tissue edema.           Impression   Ongoing, healing fracture of fibular head. Narrative   EXAMINATION:   3 XRAY VIEWS OF THE LEFT WRIST       3/18/2021 10:34 am       COMPARISON:   Wrist radiograph March 5, 2021       HISTORY:   ORDERING SYSTEM PROVIDED HISTORY: Closed fracture of distal end of left   radius, unspecified fracture morphology, initial encounter   TECHNOLOGIST PROVIDED HISTORY:   Reason for exam:->chronic fx   What reading provider will be dictating this exam?->CRC       FINDINGS:   There is ongoing, healing fracture of the distal radial metaphysis.  No   interval change in alignment.  Distal radioulnar articulation is intact.  The   radiocarpal articulation is intact.           Impression   Ongoing, healing fracture of distal radius.  No change in alignment.             ASSESSMENT:    ICD-10-CM    1. Closed fracture of proximal end of left fibula, unspecified fracture morphology, initial encounter  B64.295I    2. Closed fracture of distal end of left radius, unspecified fracture morphology, initial encounter  S52.502A        PLAN:   Had lengthy discussion with patient regarding their diagnosis, typical prognosis, and expected outcomes. We reviewed the possible complications from the injury itself despite treatment chosen. We also discussed treatment options including nonoperative managements versus surgical management, along with risks and benefits of each. Patient in agreement and has elected for nonoperative management for her fractures.   Discussed with patient she can wean out of her knee brace but should continue with this when she is up and ambulating, she can be weightbearing as tolerated to the left lower extremity  Patient placed in the short arm cast today  Would like to see her back in office in 2-3 weeks for repeat evaluation, anticipate transitioning her to removable Velcro brace at that time for her wrist    Electronically Signed By  Mirela Tsang DO  3/18/21    NOTE: This report was transcribed using voice recognition software.  Every effort was made to ensure accuracy; however, inadvertent computerized transcription errors may be present

## 2021-04-09 DIAGNOSIS — S82.832A CLOSED FRACTURE OF PROXIMAL END OF LEFT FIBULA, UNSPECIFIED FRACTURE MORPHOLOGY, INITIAL ENCOUNTER: Primary | ICD-10-CM

## 2021-04-09 DIAGNOSIS — S52.502A CLOSED FRACTURE OF DISTAL END OF LEFT RADIUS, UNSPECIFIED FRACTURE MORPHOLOGY, INITIAL ENCOUNTER: ICD-10-CM

## 2021-04-15 ENCOUNTER — HOSPITAL ENCOUNTER (OUTPATIENT)
Dept: GENERAL RADIOLOGY | Age: 66
Discharge: HOME | End: 2021-04-17
Payer: COMMERCIAL

## 2021-04-15 ENCOUNTER — OFFICE VISIT (OUTPATIENT)
Dept: ORTHOPEDIC SURGERY | Age: 66
End: 2021-04-15
Payer: COMMERCIAL

## 2021-04-15 VITALS — TEMPERATURE: 97.8 F

## 2021-04-15 DIAGNOSIS — S82.832D CLOSED FRACTURE OF PROXIMAL END OF LEFT FIBULA WITH ROUTINE HEALING, UNSPECIFIED FRACTURE MORPHOLOGY, SUBSEQUENT ENCOUNTER: Primary | ICD-10-CM

## 2021-04-15 DIAGNOSIS — S82.832A CLOSED FRACTURE OF PROXIMAL END OF LEFT FIBULA, UNSPECIFIED FRACTURE MORPHOLOGY, INITIAL ENCOUNTER: ICD-10-CM

## 2021-04-15 DIAGNOSIS — S52.502D CLOSED FRACTURE OF DISTAL END OF LEFT RADIUS WITH ROUTINE HEALING, UNSPECIFIED FRACTURE MORPHOLOGY, SUBSEQUENT ENCOUNTER: ICD-10-CM

## 2021-04-15 DIAGNOSIS — S52.502A CLOSED FRACTURE OF DISTAL END OF LEFT RADIUS, UNSPECIFIED FRACTURE MORPHOLOGY, INITIAL ENCOUNTER: ICD-10-CM

## 2021-04-15 PROCEDURE — 73110 X-RAY EXAM OF WRIST: CPT

## 2021-04-15 PROCEDURE — 99212 OFFICE O/P EST SF 10 MIN: CPT | Performed by: PHYSICIAN ASSISTANT

## 2021-04-15 PROCEDURE — L3809 WHFO W/O JOINTS PRE OTS: HCPCS | Performed by: PHYSICIAN ASSISTANT

## 2021-04-15 PROCEDURE — 73560 X-RAY EXAM OF KNEE 1 OR 2: CPT

## 2021-04-15 PROCEDURE — 99024 POSTOP FOLLOW-UP VISIT: CPT | Performed by: PHYSICIAN ASSISTANT

## 2021-04-15 NOTE — PROGRESS NOTES
Chief Complaint   Patient presents with    Arm Injury     left distal radius fx    Leg Injury     left fib fracture. SUBJECTIVE: Chino Hernández is an 61 y.o. female who is following up on nonoperative management of left distal radius and left proximal fibula fracture, DOI 3/4/21, patient 6 weeks from DOI. Essentially immobilized this entire time to the left wrist. Patient states that knee pain significantly improved, ROM better, bruising has resolved. Patient reports increased pain and soreness to the left wrist now that cast is removed. Patient's largest issue is of hand and wrist stiffness. Denies any new N/T, denies any new falls or injuries to exacerbate symptoms. Patient has been increasing her activity at home including yardwork. Review of Systems -   General ROS: negative for - chills, fatigue, fever or night sweats  Respiratory ROS: no cough, shortness of breath, or wheezing  Cardiovascular ROS: no chest pain or dyspnea on exertion  Gastrointestinal ROS: no abdominal pain, change in bowel habits, or black or bloody stools  Genitourinary: no hematuria, dysuria, or incontinence   Musculoskeletal ROS:see above  Neurological ROS: no TIA or stroke symptoms       OBJECTIVE:   Alert and oriented X 3, no acute distress, respirations easy and unlabored with no audible wheezes, skin warm and dry, speech and dress appropriate for noted age, affect euthymic.     Extremity:  Left Upper Extremity  Skin is clean dry and intact without signs of breakdown from cast  Healing ecchymosis noted  no edema noted  Radial pulse palpable, fingers warm with BCR  Flex/extension intact to wrist, thumb and fingers   Finger opposition intact  Finger adduction/abduction intact  Finger crossover intact  Patient is able to make a full concentric fist  Subjectively states sensation intact to Median Nerve, Ulnar Nerve and Radial Nerve distribution  TTP about the distal radius    Left Lower Extremity  · Skin is intact circumferentially  · +TTP mildly about the proximal fibula  · Compartments soft and compressible  · Full AROM of the knee  · Palpable dorsalis pedis and posterior tibialis pulse, brisk cap refill to toes, foot warm and perfused  · Sensation intact to light touch in sural/deep peroneal/superficial peroneal/saphenous/posterior tibial nerve distributions to foot/ankle  · Demonstrates active ankle plantar/dorsiflexion/great toe extension  · Ambulatory in clinic without assistive device and without antalgic gait       XR: 4/15/21 3V of the left wrist demonstrates stable alignment of distal radius fracture with interval healing. No other acute findings  AP/Lateral views of the left knee redemonstrates proximal fibular neck fracture without interval change in alignment. Temp 97.8 °F (36.6 °C) (Oral)     ASSESSMENT:     Diagnosis Orders   1. Closed fracture of proximal end of left fibula with routine healing, unspecified fracture morphology, subsequent encounter  External Referral To Physical Therapy    External Referral To Occupational Therapy   2.  Closed fracture of distal end of left radius with routine healing, unspecified fracture morphology, subsequent encounter  External Referral To Physical Therapy    External Referral To Occupational Therapy       PLAN:  Cast removed from left wrist  Still would like you to protect the weightbearing, no more than 1-2 lbs with the left wrist/hand while in the brace until therapy advances    Brace to be worn at all times except for therapy/hygiene/hot or cold therapy    Continue Weightbearing as tolerated on left lower extremity-- ok to increase your activity as you tolerate    Therapy for the wrist and the knee ordered today    OK to use heat before activity and ice after    Tylenol/ibuprofen as needed for soreness    Range of motion to the wrist will improve gradually, if will take time for it to be close to the right wrist range of motion      Electronically signed by Mary Menezes FRANKIE Scales on 4/15/2021 at 2:59 PM  Note: This report was completed using [employer] voiced recognition software. Every effort has been made to ensure accuracy; however, inadvertent computerized transcription errors may be present.

## 2021-04-15 NOTE — PROGRESS NOTES
Maria Esther Garza is a 61 y.o. female who presents for follow up of     1. Closed fracture of proximal end of left fibula, unspecified fracture morphology, initial encounter  S82.612L     2. Closed fracture of distal end of left radius, unspecified fracture morphology, initial encounter          SURGEON: Dr. Volodymyr Aponte DO  Date of Injury/Surgery: 03/05/21  Date last seen in office: 03/18/21    Symptoms: better  New complaints: n/a    Cast/Splint, Brace, or Dressings: Clean, dry and intact, Well fitting and Taken down for visit    Weightbearing: WBAT LLE, NWB LUE      Assistive device No Device  Participating in therapy (location if yes)?  No, requesting orders

## 2021-04-15 NOTE — PATIENT INSTRUCTIONS
Cast removed from left wrist  Still would like you to protect the weightbearing, no more than 1-2 lbs with the left wrist/hand while in the brace until therapy advances    Brace to be worn at all times except for therapy/hygiene/hot or cold therapy    Continue Weightbearing as tolerated on left lower extremity-- ok to increase your activity as you tolerate    Therapy for the wrist and the knee ordered today    OK to use heat before activity and ice after    Tylenol/ibuprofen as needed for soreness    Range of motion to the wrist will improve gradually, if will take time for it to be close to the right wrist range of motion

## 2021-05-27 ENCOUNTER — OFFICE VISIT (OUTPATIENT)
Dept: ORTHOPEDIC SURGERY | Age: 66
End: 2021-05-27
Payer: COMMERCIAL

## 2021-05-27 ENCOUNTER — HOSPITAL ENCOUNTER (OUTPATIENT)
Dept: GENERAL RADIOLOGY | Age: 66
Discharge: HOME | End: 2021-05-29
Payer: COMMERCIAL

## 2021-05-27 VITALS — TEMPERATURE: 97.2 F

## 2021-05-27 DIAGNOSIS — S52.502D CLOSED FRACTURE OF DISTAL END OF LEFT RADIUS WITH ROUTINE HEALING, UNSPECIFIED FRACTURE MORPHOLOGY, SUBSEQUENT ENCOUNTER: ICD-10-CM

## 2021-05-27 DIAGNOSIS — S82.832D CLOSED FRACTURE OF PROXIMAL END OF LEFT FIBULA WITH ROUTINE HEALING, UNSPECIFIED FRACTURE MORPHOLOGY, SUBSEQUENT ENCOUNTER: Primary | ICD-10-CM

## 2021-05-27 DIAGNOSIS — S82.832D CLOSED FRACTURE OF PROXIMAL END OF LEFT FIBULA WITH ROUTINE HEALING, UNSPECIFIED FRACTURE MORPHOLOGY, SUBSEQUENT ENCOUNTER: ICD-10-CM

## 2021-05-27 PROCEDURE — 73560 X-RAY EXAM OF KNEE 1 OR 2: CPT

## 2021-05-27 PROCEDURE — 99024 POSTOP FOLLOW-UP VISIT: CPT | Performed by: PHYSICIAN ASSISTANT

## 2021-05-27 PROCEDURE — 99212 OFFICE O/P EST SF 10 MIN: CPT | Performed by: PHYSICIAN ASSISTANT

## 2021-05-27 PROCEDURE — 73110 X-RAY EXAM OF WRIST: CPT

## 2021-05-27 NOTE — PROGRESS NOTES
Chief Complaint   Patient presents with    Follow-up     left wrist and left knee       left distal radius and left proximal fibula fracture  nonoperative management  DOI 3/4/21    Subjective:  Kiley Coughlin is approximately 3 months for the above date of injury. Overall doing very well and has been full weightbearing to the left upper extremity and left lower extremity. Ambulates without any assistive devices. States that she is very active in physical therapy and home excise program like to extend her therapy sessions, as she will be finished in approximately 2 weeks. No new injuries or any other orthopedic complaints. States that her biggest complaint today is her somewhat limited left wrist range of motion compared to the right wrist however she is still seeing improvement with continued therapy. Still has some intermittent pain to the left wrist with very little to no pain at the left knee. Review of Systems -    General ROS: negative for - chills, fatigue, fever or night sweats  Respiratory ROS: no cough, shortness of breath, or wheezing  Cardiovascular ROS: no chest pain or dyspnea on exertion  Gastrointestinal ROS: no abdominal pain, nausea, vomiting, diarrhea, constipation,or black or bloody stools  Genitourinary: no hematuria, dysuria, or incontinence   Musculoskeletal ROS: negative for -back or neck pain or stiffness, also see HPI  Neurological ROS: no TIA or stroke symptoms       Objective:    General: Alert and oriented X 3, normocephalic atraumatic, external ears and eye normal, sclera clear, no acute distress, respirations easy and unlabored with no audible wheezes, skin warm and dry, speech and dress appropriate for noted age, affect euthymic.     Extremity:  Left Upper Extremity  Skin is clean dry and intact  No edema noted  Radial pulse palpable, fingers warm with BCR  Flex/extension intact to wrist, thumb and fingers  Finger opposition intact  Finger adduction/abduction intact  Finger by Giselle Wolff PA-C on 5/27/2021 at 11:20 AM  Note: This report was completed using [employer] voiced recognition software. Every effort has been made to ensure accuracy; however, inadvertent computerized transcription errors may be present.

## 2021-05-27 NOTE — PROGRESS NOTES
Chandrika Niño is a 61 y.o. female who presents for follow up of left wrist and left knee    SURGEON: Dr. Rito Raman, DO  Date of Injury/Surgery: 3/4/2021  Date last seen in office: 4/15/2021    Symptoms: better  New complaints: none, left knee feeling better, left wrist is still slightly painful    Cast/Splint, Brace, or Dressings: Clean, dry and intact, Well fitting and Taken down for visit    Weightbearing: left upper and left lower Weight bearing as tolerated      Assistive device Wrist velcro brace  Participating in therapy (location if yes)?  yes, [place] [facility]    Refills Needed: None  Order/Referral Needed: N/A

## 2021-06-16 ENCOUNTER — TELEPHONE (OUTPATIENT)
Dept: FAMILY MEDICINE CLINIC | Age: 66
End: 2021-06-16

## 2021-06-16 RX ORDER — AMOXICILLIN 500 MG/1
500 CAPSULE ORAL 3 TIMES DAILY
Qty: 21 CAPSULE | Refills: 0 | Status: SHIPPED
Start: 2021-06-16 | End: 2021-06-16

## 2021-06-16 RX ORDER — METHYLPREDNISOLONE 4 MG/1
TABLET ORAL
Qty: 1 KIT | Refills: 3 | Status: SHIPPED | OUTPATIENT
Start: 2021-06-16 | End: 2021-06-22

## 2021-06-16 NOTE — TELEPHONE ENCOUNTER
Patient was given clindamycin hcl 150mg by her dentist because she is having a tooth extraction,she only took one day and now has a rash that is spreading all over her,arms,back(benedryll cream not doing anything,dentisit told her to call her doctor)

## 2021-06-16 NOTE — TELEPHONE ENCOUNTER
Send in Medrol dose pack for rash. What is her reaction to PCN? I need to know so I can decide an alternative abx for her.

## 2021-06-16 NOTE — TELEPHONE ENCOUNTER
Patient is afraid to take amoxil   Says she is allergic to penicillin   Also wants to know what she can take for the rash,   The pharmacist told her to take benadryl cream but the rash is spreading around her breast

## 2021-11-02 DIAGNOSIS — E78.2 MIXED HYPERLIPIDEMIA: Primary | ICD-10-CM

## 2021-11-03 ENCOUNTER — NURSE ONLY (OUTPATIENT)
Dept: FAMILY MEDICINE CLINIC | Age: 66
End: 2021-11-03
Payer: COMMERCIAL

## 2021-11-03 DIAGNOSIS — R53.83 OTHER FATIGUE: ICD-10-CM

## 2021-11-03 DIAGNOSIS — Z13.220 SCREENING FOR HYPERLIPIDEMIA: ICD-10-CM

## 2021-11-03 DIAGNOSIS — E78.2 MIXED HYPERLIPIDEMIA: ICD-10-CM

## 2021-11-03 LAB
BASOPHILS ABSOLUTE: 0.04 E9/L (ref 0–0.2)
BASOPHILS RELATIVE PERCENT: 0.4 % (ref 0–2)
EOSINOPHILS ABSOLUTE: 0.05 E9/L (ref 0.05–0.5)
EOSINOPHILS RELATIVE PERCENT: 0.5 % (ref 0–6)
HCT VFR BLD CALC: 42.4 % (ref 34–48)
HEMOGLOBIN: 13.4 G/DL (ref 11.5–15.5)
IMMATURE GRANULOCYTES #: 0.03 E9/L
IMMATURE GRANULOCYTES %: 0.3 % (ref 0–5)
LYMPHOCYTES ABSOLUTE: 1.29 E9/L (ref 1.5–4)
LYMPHOCYTES RELATIVE PERCENT: 12.3 % (ref 20–42)
MCH RBC QN AUTO: 30.3 PG (ref 26–35)
MCHC RBC AUTO-ENTMCNC: 31.6 % (ref 32–34.5)
MCV RBC AUTO: 95.9 FL (ref 80–99.9)
MONOCYTES ABSOLUTE: 0.85 E9/L (ref 0.1–0.95)
MONOCYTES RELATIVE PERCENT: 8.1 % (ref 2–12)
NEUTROPHILS ABSOLUTE: 8.23 E9/L (ref 1.8–7.3)
NEUTROPHILS RELATIVE PERCENT: 78.4 % (ref 43–80)
PDW BLD-RTO: 13.5 FL (ref 11.5–15)
PLATELET # BLD: 291 E9/L (ref 130–450)
PMV BLD AUTO: 9.9 FL (ref 7–12)
RBC # BLD: 4.42 E12/L (ref 3.5–5.5)
WBC # BLD: 10.5 E9/L (ref 4.5–11.5)

## 2021-11-03 PROCEDURE — 36415 COLL VENOUS BLD VENIPUNCTURE: CPT | Performed by: FAMILY MEDICINE

## 2021-11-04 LAB
ALBUMIN SERPL-MCNC: 4.4 G/DL (ref 3.5–5.2)
ALP BLD-CCNC: 87 U/L (ref 35–104)
ALT SERPL-CCNC: 7 U/L (ref 0–32)
ANION GAP SERPL CALCULATED.3IONS-SCNC: 12 MMOL/L (ref 7–16)
AST SERPL-CCNC: 18 U/L (ref 0–31)
BILIRUB SERPL-MCNC: 0.5 MG/DL (ref 0–1.2)
BUN BLDV-MCNC: 18 MG/DL (ref 6–23)
CALCIUM SERPL-MCNC: 9.7 MG/DL (ref 8.6–10.2)
CHLORIDE BLD-SCNC: 103 MMOL/L (ref 98–107)
CHOLESTEROL, TOTAL: 211 MG/DL (ref 0–199)
CO2: 24 MMOL/L (ref 22–29)
CREAT SERPL-MCNC: 1 MG/DL (ref 0.5–1)
GFR AFRICAN AMERICAN: >60
GFR NON-AFRICAN AMERICAN: 56 ML/MIN/1.73
GLUCOSE BLD-MCNC: 84 MG/DL (ref 74–99)
HDLC SERPL-MCNC: 44 MG/DL
LDL CHOLESTEROL CALCULATED: 149 MG/DL (ref 0–99)
POTASSIUM SERPL-SCNC: 4.8 MMOL/L (ref 3.5–5)
SODIUM BLD-SCNC: 139 MMOL/L (ref 132–146)
TOTAL PROTEIN: 6.8 G/DL (ref 6.4–8.3)
TRIGL SERPL-MCNC: 92 MG/DL (ref 0–149)
TSH SERPL DL<=0.05 MIU/L-ACNC: 2.43 UIU/ML (ref 0.27–4.2)
VLDLC SERPL CALC-MCNC: 18 MG/DL

## 2021-11-10 ENCOUNTER — OFFICE VISIT (OUTPATIENT)
Dept: FAMILY MEDICINE CLINIC | Age: 66
End: 2021-11-10
Payer: COMMERCIAL

## 2021-11-10 VITALS
RESPIRATION RATE: 16 BRPM | OXYGEN SATURATION: 98 % | DIASTOLIC BLOOD PRESSURE: 80 MMHG | SYSTOLIC BLOOD PRESSURE: 125 MMHG | HEART RATE: 83 BPM | BODY MASS INDEX: 20.52 KG/M2 | WEIGHT: 115.8 LBS | TEMPERATURE: 97 F | HEIGHT: 63 IN

## 2021-11-10 DIAGNOSIS — D72.810 LYMPHOPENIA: ICD-10-CM

## 2021-11-10 DIAGNOSIS — Z00.00 ANNUAL PHYSICAL EXAM: Primary | ICD-10-CM

## 2021-11-10 PROCEDURE — 99396 PREV VISIT EST AGE 40-64: CPT | Performed by: FAMILY MEDICINE

## 2021-11-10 SDOH — ECONOMIC STABILITY: FOOD INSECURITY: WITHIN THE PAST 12 MONTHS, YOU WORRIED THAT YOUR FOOD WOULD RUN OUT BEFORE YOU GOT MONEY TO BUY MORE.: NEVER TRUE

## 2021-11-10 SDOH — ECONOMIC STABILITY: FOOD INSECURITY: WITHIN THE PAST 12 MONTHS, THE FOOD YOU BOUGHT JUST DIDN'T LAST AND YOU DIDN'T HAVE MONEY TO GET MORE.: NEVER TRUE

## 2021-11-10 ASSESSMENT — SOCIAL DETERMINANTS OF HEALTH (SDOH): HOW HARD IS IT FOR YOU TO PAY FOR THE VERY BASICS LIKE FOOD, HOUSING, MEDICAL CARE, AND HEATING?: NOT HARD AT ALL

## 2021-11-10 NOTE — PROGRESS NOTES
11/10/2021    Chief Complaint   Patient presents with    Annual Exam       Screening Questions:    Exercise 30 minutes daily 5 times weekly:  Yes   Mammogram every 1-2 years age 36, then annually after age 48: Yes   Pap smear UTD:  Yes    Specialists:  No    Fecal occult blood yearly after age 50/Colonoscopy:  Yes   Eye exam every 2-4 years, yearly if diabetic:  Yes    Dental exam:  Yes    BMI elevated: Body mass index is 20.51 kg/m². [place] Counseled on weight loss   Tobacco use: Yes  . Cessation discussed        Labs:  No results found for: EAG  Lab Results   Component Value Date    LDLCALC 149 (H) 11/03/2021      CBC:   Lab Results   Component Value Date    WBC 10.5 11/03/2021    RBC 4.42 11/03/2021    HGB 13.4 11/03/2021    HCT 42.4 11/03/2021    MCV 95.9 11/03/2021    MCH 30.3 11/03/2021    MCHC 31.6 11/03/2021    RDW 13.5 11/03/2021     11/03/2021    MPV 9.9 11/03/2021         Patient's past medical, surgical, social and/or family history reviewed, updated in chart, and are non-contributory (unless otherwise stated). Medications and allergies also reviewed and updated in chart.     ROS  Review of Systems - General ROS: negative for - chills, fatigue, fever, night sweats, sleep disturbance, weight gain or weight loss  Psychological ROS: negative for - anxiety, behavioral disorder, depression, hallucinations, irritability, memory difficulties, mood swings, sleep disturbances or suicidal ideation  ENT ROS: negative for - epistaxis, headaches, hearing change, nasal congestion, nasal discharge, nasal polyps, sinus pain, tinnitus, vertigo or visual changes  Hematological and Lymphatic ROS: negative for - bleeding problems, blood clots, fatigue or swollen lymph nodes  Respiratory ROS: negative for - cough, orthopnea, shortness of breath, sputum changes, tachypnea or wheezing  Cardiovascular ROS: negative for - chest pain, dyspnea on exertion, irregular heartbeat, loss of consciousness, palpitations, paroxysmal nocturnal dyspnea or rapid heart rate  Gastrointestinal ROS: negative for - abdominal pain, blood in stools, change in bowel habits, constipation, diarrhea, gas/bloating, heartburn or nausea/vomiting  Musculoskeletal ROS: negative for - joint pain, joint stiffness, joint swelling or muscle, back pain, bowel or bladder incontinence  Neurological ROS: negative for - behavioral changes, confusion, dizziness, headaches, memory loss, numbness/tingling, seizures or speech problems, weakness  Dermatological ROS: negative for - dry skin, mole changes, nail changes, pruritus, rash or skin lesion changes    Physical Exam  /80   Pulse 83   Temp 97 °F (36.1 °C) (Temporal)   Resp 16   Ht 5' 3\" (1.6 m)   Wt 115 lb 12.8 oz (52.5 kg)   SpO2 98%   BMI 20.51 kg/m²   Wt Readings from Last 3 Encounters:   11/10/21 115 lb 12.8 oz (52.5 kg)   03/09/21 119 lb (54 kg)   03/05/21 111 lb (50.3 kg)     Temp Readings from Last 3 Encounters:   11/10/21 97 °F (36.1 °C) (Temporal)   05/27/21 97.2 °F (36.2 °C) (Oral)   04/15/21 97.8 °F (36.6 °C) (Oral)     BP Readings from Last 3 Encounters:   11/10/21 125/80   03/09/21 100/64   03/05/21 (!) 142/70     Pulse Readings from Last 3 Encounters:   11/10/21 83   03/09/21 91   03/05/21 97       General appearance: alert, well appearing, and in no distress, oriented to person, place, and time and normal appearing weight. CVS exam: normal rate, regular rhythm, normal S1, S2, no murmurs, rubs, clicks or gallops. Radial pulses 2+ bilateral.  PT/DP pulse 2+ bilat. No C/C/E    Chest: clear to auscultation, no wheezes, rales or rhonchi, symmetric air entry. Abdomen: Soft, non-tender, non-distended, positive BS in all 4 quadrants    Extremities:Dorsalis pedis pulses palpated bilaterally, no clubbing, cyanosis, edema or erythema, Sensory exam of the foot is normal, tested with the monofilament. Good pulses, no lesions or ulcers, good peripheral pulses.     SKIN: no lesions, jaundice, petechiae, pallor, cyanosis, ecchymosis    NEURO: gross motor exam normal by observation, gait normal    Mental status - alert, oriented to person, place, and time, normal mood, behavior, speech, dress, motor activity, and thought processes      Assessment/Plan  Surekha Cespedes was seen today for annual exam.    Diagnoses and all orders for this visit:    Annual physical exam    Lymphopenia  -     CBC WITH AUTO DIFFERENTIAL; Future        Return in about 1 year (around 11/10/2022) for CBC IN 3 MONTHS, AWV. Call or go to ED immediately if symptoms worsen or persist.    Educational materials and/or home exercises printed for patient's review and were included in patient instructions on his/her After Visit Summary and given to patient at the end of visit. Counseled regarding above diagnosis, including possible risks and complications,  especially if left uncontrolled. Counseled regarding the possible side effects, risks, benefits and alternatives to treatment; patient and/or guardian verbalizes understanding, agrees, feels comfortable with and wishes to proceed with above treatment plan. Advised patient to call with any new medication issues, and read all Rx info from pharmacy to assure aware of all possible risks and side effects of medication before taking. Reviewed age and gender appropriate health screening exams and vaccinations. Advised patient regarding importance of keeping up with recommended health maintenance and to schedule as soon as possible if overdue, as this is important in assessing for undiagnosed pathology, especially cancer, as well as protecting against potentially harmful/life threatening disease. Patient and/or guardian verbalizes understanding and agrees with above counseling, assessment and plan. All questions answered.

## 2022-02-23 ENCOUNTER — HOSPITAL ENCOUNTER (OUTPATIENT)
Age: 67
Discharge: HOME | End: 2022-02-23
Payer: MEDICARE

## 2022-02-23 DIAGNOSIS — D72.810 LYMPHOPENIA: ICD-10-CM

## 2022-02-23 LAB
BASOPHILS ABSOLUTE: 0.04 E9/L (ref 0–0.2)
BASOPHILS RELATIVE PERCENT: 0.7 % (ref 0–2)
EOSINOPHILS ABSOLUTE: 0.06 E9/L (ref 0.05–0.5)
EOSINOPHILS RELATIVE PERCENT: 1.1 % (ref 0–6)
HCT VFR BLD CALC: 40.2 % (ref 34–48)
HEMOGLOBIN: 13.4 G/DL (ref 11.5–15.5)
IMMATURE GRANULOCYTES #: 0.01 E9/L
IMMATURE GRANULOCYTES %: 0.2 % (ref 0–5)
LYMPHOCYTES ABSOLUTE: 1.29 E9/L (ref 1.5–4)
LYMPHOCYTES RELATIVE PERCENT: 22.6 % (ref 20–42)
MCH RBC QN AUTO: 30.7 PG (ref 26–35)
MCHC RBC AUTO-ENTMCNC: 33.3 % (ref 32–34.5)
MCV RBC AUTO: 92 FL (ref 80–99.9)
MONOCYTES ABSOLUTE: 0.46 E9/L (ref 0.1–0.95)
MONOCYTES RELATIVE PERCENT: 8.1 % (ref 2–12)
NEUTROPHILS ABSOLUTE: 3.85 E9/L (ref 1.8–7.3)
NEUTROPHILS RELATIVE PERCENT: 67.3 % (ref 43–80)
PDW BLD-RTO: 13.2 FL (ref 11.5–15)
PLATELET # BLD: 254 E9/L (ref 130–450)
PMV BLD AUTO: 9.2 FL (ref 7–12)
RBC # BLD: 4.37 E12/L (ref 3.5–5.5)
WBC # BLD: 5.7 E9/L (ref 4.5–11.5)

## 2022-02-23 PROCEDURE — 85025 COMPLETE CBC W/AUTO DIFF WBC: CPT

## 2022-02-23 PROCEDURE — 36415 COLL VENOUS BLD VENIPUNCTURE: CPT

## 2022-03-01 ENCOUNTER — OFFICE VISIT (OUTPATIENT)
Dept: FAMILY MEDICINE CLINIC | Age: 67
End: 2022-03-01
Payer: MEDICARE

## 2022-03-01 VITALS
HEART RATE: 76 BPM | OXYGEN SATURATION: 99 % | WEIGHT: 114 LBS | SYSTOLIC BLOOD PRESSURE: 132 MMHG | TEMPERATURE: 97.4 F | BODY MASS INDEX: 20.2 KG/M2 | HEIGHT: 63 IN | DIASTOLIC BLOOD PRESSURE: 70 MMHG | RESPIRATION RATE: 18 BRPM

## 2022-03-01 DIAGNOSIS — Z00.00 WELCOME TO MEDICARE PREVENTIVE VISIT: Primary | ICD-10-CM

## 2022-03-01 DIAGNOSIS — Z12.11 SPECIAL SCREENING FOR MALIGNANT NEOPLASMS, COLON: ICD-10-CM

## 2022-03-01 LAB
CONTROL: NORMAL
HEMOCCULT STL QL: NEGATIVE

## 2022-03-01 PROCEDURE — G0402 INITIAL PREVENTIVE EXAM: HCPCS | Performed by: FAMILY MEDICINE

## 2022-03-01 PROCEDURE — 82274 ASSAY TEST FOR BLOOD FECAL: CPT | Performed by: FAMILY MEDICINE

## 2022-03-01 ASSESSMENT — LIFESTYLE VARIABLES: HOW OFTEN DO YOU HAVE A DRINK CONTAINING ALCOHOL: NEVER

## 2022-03-01 ASSESSMENT — PATIENT HEALTH QUESTIONNAIRE - PHQ9
2. FEELING DOWN, DEPRESSED OR HOPELESS: 0
SUM OF ALL RESPONSES TO PHQ QUESTIONS 1-9: 0
SUM OF ALL RESPONSES TO PHQ QUESTIONS 1-9: 0
SUM OF ALL RESPONSES TO PHQ9 QUESTIONS 1 & 2: 0
1. LITTLE INTEREST OR PLEASURE IN DOING THINGS: 0
SUM OF ALL RESPONSES TO PHQ QUESTIONS 1-9: 0
SUM OF ALL RESPONSES TO PHQ QUESTIONS 1-9: 0

## 2022-03-01 NOTE — PATIENT INSTRUCTIONS
Personalized Preventive Plan for Ruba Romero - 3/1/2022  Medicare offers a range of preventive health benefits. Some of the tests and screenings are paid in full while other may be subject to a deductible, co-insurance, and/or copay. Some of these benefits include a comprehensive review of your medical history including lifestyle, illnesses that may run in your family, and various assessments and screenings as appropriate. After reviewing your medical record and screening and assessments performed today your provider may have ordered immunizations, labs, imaging, and/or referrals for you. A list of these orders (if applicable) as well as your Preventive Care list are included within your After Visit Summary for your review. Other Preventive Recommendations:    · A preventive eye exam performed by an eye specialist is recommended every 1-2 years to screen for glaucoma; cataracts, macular degeneration, and other eye disorders. · A preventive dental visit is recommended every 6 months. · Try to get at least 150 minutes of exercise per week or 10,000 steps per day on a pedometer . · Order or download the FREE \"Exercise & Physical Activity: Your Everyday Guide\" from [employer]. Call 4-298.283.5315 or search [employer] online. · You need 6548-4571 mg of calcium and 0517-3847 IU of vitamin D per day. It is possible to meet your calcium requirement with diet alone, but a vitamin D supplement is usually necessary to meet this goal.  · When exposed to the sun, use a sunscreen that protects against both UVA and UVB radiation with an SPF of 30 or greater. Reapply every 2 to 3 hours or after sweating, drying off with a towel, or swimming. · Always wear a seat belt when traveling in a car. Always wear a helmet when riding a bicycle or motorcycle.

## 2022-03-01 NOTE — PROGRESS NOTES
Medicare Annual Wellness Visit    Dionne Horner is here for Medicare [address] was seen today for medicare awv. Diagnoses and all orders for this visit:    Welcome to Medicare preventive visit    Special screening for malignant neoplasms, colon  -     POCT Fecal Immunochemical Test (FIT)         Recommendations for Preventive Services Due: see orders and patient instructions/AVS.  Recommended screening schedule for the next 5-10 years is provided to the patient in written form: see Patient Instructions/AVS.     Return for Medicare Annual Wellness Visit in 1 year. Subjective   The following acute and/or chronic problems were also addressed today:    Patient's complete Health Risk Assessment and screening values have been reviewed and are found in Flowsheets. The following problems were reviewed today and where indicated follow up appointments were made and/or referrals ordered.     Positive Risk Factor Screenings with Interventions:               General Health and ACP:  General  In general, how would you say your health is?: Excellent  In the past 7 days, have you experienced any of the following: New or Increased Pain, New or Increased Fatigue, Loneliness, Social Isolation, Stress or Anger?: (!) Yes  Select all that apply: (!) Stress  Do you get the social and emotional support that you need?: Yes  Do you have a Living Will?: (!) No    Advance Directives     Power of  Living Will ACP-Advance Directive ACP-Power of     Not on File Filed on 12/13/12 Filed Not on File      General Health Risk Interventions:  · No Living Will: [address] addressed with patient today    Health Habits/Nutrition:     Physical Activity: Insufficiently Active    Days of Exercise per Week: 3 days    Minutes of Exercise per Session: 20 min     Have you lost any weight without trying in the past 3 months?: No  Body mass index: 20.19  Have you seen the dentist within the past year?: (!) No    Health Habits/Nutrition Interventions:  · Dental exam overdue:  patient encouraged to make appointment with his/her dentist             Objective   Vitals:    03/01/22 1107   BP: 132/70   Pulse: 76   Resp: 18   Temp: 97.4 °F (36.3 °C)   SpO2: 99%   Weight: 114 lb (51.7 kg)   Height: 5' 3\" (1.6 m)      Body mass index is 20.19 kg/m². General Appearance: alert and oriented to person, place and time, well developed and well- nourished, in no acute distress  Skin: warm and dry, no rash or erythema  Head: normocephalic and atraumatic  Eyes: pupils equal, round, and reactive to light, extraocular eye movements intact, conjunctivae normal  ENT: tympanic membrane, external ear and ear canal normal bilaterally, nose without deformity, nasal mucosa and turbinates normal without polyps  Neck: supple and non-tender without mass, no thyromegaly or thyroid nodules, no cervical lymphadenopathy  Pulmonary/Chest: clear to auscultation bilaterally- no wheezes, rales or rhonchi, normal air movement, no respiratory distress  Cardiovascular: normal rate, regular rhythm, normal S1 and S2, no murmurs, rubs, clicks, or gallops, distal pulses intact, no carotid bruits  Abdomen: soft, non-tender, non-distended, normal bowel sounds, no masses or organomegaly  Extremities: no cyanosis, clubbing or edema  Musculoskeletal: normal range of motion, no joint swelling, deformity or tenderness  Neurologic: reflexes normal and symmetric, no cranial nerve deficit, gait, coordination and speech normal       Allergies   Allergen Reactions    Xanax Xr [Alprazolam Er] Shortness Of Breath     Prior to Visit Medications    Medication Sig Taking?  Authorizing Provider   tretinoin (RETIN-A) 0.025 % cream apply to [address] Provider, MD   acetaminophen (TYLENOL) 325 MG tablet Take 650 mg by mouth every 6 hours as needed for Pain  Historical Provider, MD   Hyoscyamine Sulfate SL (LEVSIN/SL) 0.125 MG SUBL Place 1 tablet under the tongue every 4 hours  Patient taking differently: Place 1 tablet under the tongue every 4 hours as needed   Alan Padilla DO       CareTeam (Including outside providers/suppliers regularly involved in providing care):   Patient Care Team:  Franchesca Perdomo DO as PCP - General (Family Medicine)  Alan Padilla DO as PCP - [facility] Empaneled Provider  Erasmo Horta MD as Consulting Physician (Psychiatry)  Shawnie Gaucher, MD as Obstetrician (Obstetrics & Gynecology)    Reviewed and updated this visit:  Tobacco  Allergies  Meds  Problems  Med Hx  Surg Hx  Soc Hx  Fam Hx

## 2022-11-02 LAB
MAMMOGRAPHY, EXTERNAL: NORMAL
PAP SMEAR, EXTERNAL: NORMAL

## 2022-11-14 ENCOUNTER — OFFICE VISIT (OUTPATIENT)
Dept: FAMILY MEDICINE CLINIC | Age: 67
End: 2022-11-14
Payer: MEDICARE

## 2022-11-14 VITALS
TEMPERATURE: 97.2 F | HEIGHT: 62 IN | OXYGEN SATURATION: 100 % | BODY MASS INDEX: 21.11 KG/M2 | RESPIRATION RATE: 19 BRPM | HEART RATE: 80 BPM | DIASTOLIC BLOOD PRESSURE: 79 MMHG | WEIGHT: 114.7 LBS | SYSTOLIC BLOOD PRESSURE: 124 MMHG

## 2022-11-14 DIAGNOSIS — K58.2 IRRITABLE BOWEL SYNDROME WITH BOTH CONSTIPATION AND DIARRHEA: Primary | ICD-10-CM

## 2022-11-14 DIAGNOSIS — E78.2 MIXED HYPERLIPIDEMIA: ICD-10-CM

## 2022-11-14 PROCEDURE — 1123F ACP DISCUSS/DSCN MKR DOCD: CPT | Performed by: FAMILY MEDICINE

## 2022-11-14 PROCEDURE — 99214 OFFICE O/P EST MOD 30 MIN: CPT | Performed by: FAMILY MEDICINE

## 2022-11-14 RX ORDER — DICYCLOMINE HYDROCHLORIDE 10 MG/1
10 CAPSULE ORAL 4 TIMES DAILY
Qty: 120 CAPSULE | Refills: 0 | Status: SHIPPED | OUTPATIENT
Start: 2022-11-14

## 2022-11-14 SDOH — ECONOMIC STABILITY: FOOD INSECURITY: WITHIN THE PAST 12 MONTHS, YOU WORRIED THAT YOUR FOOD WOULD RUN OUT BEFORE YOU GOT MONEY TO BUY MORE.: NEVER TRUE

## 2022-11-14 SDOH — ECONOMIC STABILITY: FOOD INSECURITY: WITHIN THE PAST 12 MONTHS, THE FOOD YOU BOUGHT JUST DIDN'T LAST AND YOU DIDN'T HAVE MONEY TO GET MORE.: NEVER TRUE

## 2022-11-14 ASSESSMENT — SOCIAL DETERMINANTS OF HEALTH (SDOH): HOW HARD IS IT FOR YOU TO PAY FOR THE VERY BASICS LIKE FOOD, HOUSING, MEDICAL CARE, AND HEATING?: NOT HARD AT ALL

## 2022-11-14 NOTE — PROGRESS NOTES
Annual exam:  Patient is here for routine yearly physical/preventative visit. Patient has no complaints or concerns today. Patient {IS/IS WOE:33394} generally healthy. Chronic medical conditions are generally controlled. Most recent labs reviewed with patient and  {ARE/ARE NOT:21119} remarkable. Health maintenance reviewed with patient and {IS/IS NOT:19932} up to date. Overall doing well. No questions or concerns a this time. Pt brought in some papers with her today regarding other results. HM reviewed. No recent labs, pt is not fasting at this time.      Past Medical History:   Diagnosis Date    Abnormal Pap smear of cervix     Anxiety     Detached retina, right 08/17/2010,10/29/10    surgery, Dr. Miriam [facility]       Past Surgical History:   Procedure Laterality Date    CATARACT REMOVAL  07/17/11    also had scar tissue removed at this time from prev retinal surgeries    LEEP      TONSILLECTOMY AND ADENOIDECTOMY        Family History   Problem Relation Age of Onset    High Cholesterol Mother     Cancer Maternal Uncle         colon    Diabetes Maternal Grandmother     High Blood Pressure Maternal Grandmother     Cancer Maternal Uncle         colon     Social History     Socioeconomic History    Marital status:      Spouse name: Not on file    Number of children: Not on file    Years of education: Not on file    Highest education level: Not on file   Occupational History    Not on file   Tobacco Use    Smoking status: Never    Smokeless tobacco: Never   Substance and Sexual Activity    Alcohol use: No    Drug use: No    Sexual activity: Yes     Partners: Male   Other Topics Concern    Not on file   Social History Narrative    Not on file     Social Determinants of Health     Financial Resource Strain: Low Risk     Difficulty of Paying Living Expenses: Not hard at all   Food Insecurity: No Food Insecurity    Worried About Running Out of Food in the Last Year: Never true    [address] in the Last Year: Never true   Transportation Needs: Not on file   Physical Activity: Insufficiently Active    Days of Exercise per Week: 3 days    Minutes of Exercise per Session: 20 min   Stress: Not on file   Social Connections: Not on file   Intimate Partner Violence: Not on file   Housing Stability: Not on file      Allergies   Allergen Reactions    Xanax Xr [Alprazolam Er] Shortness Of Breath        Review of Systems:  Constitutional:  No fever, no fatigue, no chills, no headaches, no weight change  Dermatology:  No rash, no mole, no dry or sensitive skin  ENT:  No cough, no sore throat, no sinus pain, no runny nose, no ear pain  Cardiology:  No chest pain, no palpitations, no leg edema, no shortness of breath, no PND  Endocrinology:  No polydipsia, no polyuria, no cold intolerance, no heat intolerance, no polyphagia, no hair changes  Gastroenterology:  No dysphagia, no abdominal pain, no nausea, no vomiting, no constipation, no diarrhea, no heartburn  Female Reproductive:  No hot flashes, no abnormal vaginal discharge, no pain with menstruation, no pelvic pain  Musculoskeletal:  No joint pain, no leg cramps, no back pain, no muscle aches  Respiratory:  No shortness of breath, no orthopnea, no wheezing, no ANDERSON, no hemoptysis  Urology:  No blood in the urine, no urinary frequency, no urinary incontinence, no urinary urgency, no nocturia, no dysuria  Neurology:  No numbness/tingling, no dizziness, no weakness  Psychology:  No depression, no sleep disturbances, no suicidal ideation, no anxiety

## 2022-11-14 NOTE — PROGRESS NOTES
11/14/2022    Chief Complaint   Patient presents with    Hyperlipidemia       Josué Browning is a 74 y.o. patient that presents today for:No questions or concerns a this time. Pt brought in some papers with her today regarding other results. HM reviewed. No recent labs, pt is not fasting at this time. Hyperlipidemia:  Patient presents with hyperlipidemia. Is asymptomatic. This is a chronic problem. Patient is  well controlled, as reviewed and seen on most recent labs. Compliance with treatment thus far has been adequate. No adverse effects. Irritable Bowel Syndrome: Patient complains of abdominal cramping, abdominal pain, irritable bowel syndrome, and loose stools. Onset of symptoms was several years ago. Patient denies weight loss, melena, hematochezia. Symptoms have stabilized. Was on Levsin in the past. Unable to take due to Ae. Would like to try something else. Has had 2 positive stool tests in the past. Does REFUSE colonoscopy. Feeling well otherwise, no complaints. Patient's past medical, surgical, social and/or family history reviewed, updated in chart, and are non-contributory (unless otherwise stated). Medications and allergies also reviewed and updated in chart.      Physical Exam  /79   Pulse 80   Temp 97.2 °F (36.2 °C)   Resp 19   Ht 5' 2\" (1.575 m)   Wt 114 lb 11.2 oz (52 kg)   SpO2 100%   BMI 20.98 kg/m²   Wt Readings from Last 3 Encounters:   11/14/22 114 lb 11.2 oz (52 kg)   03/01/22 114 lb (51.7 kg)   11/10/21 115 lb 12.8 oz (52.5 kg)     Temp Readings from Last 3 Encounters:   11/14/22 97.2 °F (36.2 °C)   03/01/22 97.4 °F (36.3 °C)   11/10/21 97 °F (36.1 °C) (Temporal)     BP Readings from Last 3 Encounters:   11/14/22 124/79   03/01/22 132/70   11/10/21 125/80     Pulse Readings from Last 3 Encounters:   11/14/22 80   03/01/22 76   11/10/21 83       General appearance: alert, well appearing, and in no distress, oriented to person, place, and time and normal appearing weight. CVS exam: normal rate, regular rhythm, normal S1, S2, no murmurs, rubs, clicks or gallops. Radial pulses 2+ bilateral.  PT/DP pulse 2+ bilat. No C/C/E    Chest: clear to auscultation, no wheezes, rales or rhonchi, symmetric air entry. Musculoskeletal: MS 5/5, DTR 2/4 x4 extremities, FROM F/E spine, no tenderness, obvious masses or deformities. Gait normal.     Abdomen: Soft, non-tender, non-distended, positive BS in all 4 quadrants    Extremities:Dorsalis pedis pulses palpated bilaterally, no clubbing, cyanosis, edema or erythema     SKIN: no lesions, jaundice, petechiae, pallor, cyanosis, ecchymosis    NEURO: gross motor exam normal by observation, gait normal      Assessment/Plan  Dulce Bobby was seen today for hyperlipidemia. Diagnoses and all orders for this visit:    Irritable bowel syndrome with both constipation and diarrhea  -     dicyclomine (BENTYL) 10 MG capsule; Take 1 capsule by mouth 4 times daily    Mixed hyperlipidemia  -     CBC; Future  -     Comprehensive Metabolic Panel; Future  -     Lipid Panel; Future        No follow-ups on file. Carrie Dumont, DO    Call or go to ED immediately if symptoms worsen or persist.    Educational materials and/or home exercises printed for patient's review and were included in patient instructions on his/her After Visit Summary and given to patient at the end of visit. Counseled regarding above diagnosis, including possible risks and complications,  especially if left uncontrolled. Counseled regarding the possible side effects, risks, benefits and alternatives to treatment; patient and/or guardian verbalizes understanding, agrees, feels comfortable with and wishes to proceed with above treatment plan. Advised patient to call with any new medication issues, and read all Rx info from pharmacy to assure aware of all possible risks and side effects of medication before taking.     Reviewed age and gender appropriate health screening exams and vaccinations. Advised patient regarding importance of keeping up with recommended health maintenance and to schedule as soon as possible if overdue, as this is important in assessing for undiagnosed pathology, especially cancer, as well as protecting against potentially harmful/life threatening disease. Patient and/or guardian verbalizes understanding and agrees with above counseling, assessment and plan. All questions answered.

## 2022-11-28 ENCOUNTER — HOSPITAL ENCOUNTER (OUTPATIENT)
Age: 67
Discharge: HOME | End: 2022-11-28
Payer: MEDICARE

## 2022-11-28 DIAGNOSIS — E78.2 MIXED HYPERLIPIDEMIA: ICD-10-CM

## 2022-11-28 LAB
ALBUMIN SERPL-MCNC: 4.5 G/DL (ref 3.5–5.2)
ALP BLD-CCNC: 84 U/L (ref 35–104)
ALT SERPL-CCNC: 6 U/L (ref 0–32)
ANION GAP SERPL CALCULATED.3IONS-SCNC: 10 MMOL/L (ref 7–16)
AST SERPL-CCNC: 16 U/L (ref 0–31)
BILIRUB SERPL-MCNC: 0.4 MG/DL (ref 0–1.2)
BUN BLDV-MCNC: 17 MG/DL (ref 6–23)
CALCIUM SERPL-MCNC: 9.6 MG/DL (ref 8.6–10.2)
CHLORIDE BLD-SCNC: 105 MMOL/L (ref 98–107)
CHOLESTEROL, TOTAL: 224 MG/DL (ref 0–199)
CO2: 26 MMOL/L (ref 22–29)
CREAT SERPL-MCNC: 0.9 MG/DL (ref 0.5–1)
GFR SERPL CREATININE-BSD FRML MDRD: >60 ML/MIN/1.73
GLUCOSE BLD-MCNC: 94 MG/DL (ref 74–99)
HCT VFR BLD CALC: 41.5 % (ref 34–48)
HDLC SERPL-MCNC: 51 MG/DL
HEMOGLOBIN: 13.9 G/DL (ref 11.5–15.5)
LDL CHOLESTEROL CALCULATED: 153 MG/DL (ref 0–99)
MCH RBC QN AUTO: 30.2 PG (ref 26–35)
MCHC RBC AUTO-ENTMCNC: 33.5 % (ref 32–34.5)
MCV RBC AUTO: 90.2 FL (ref 80–99.9)
PDW BLD-RTO: 13.2 FL (ref 11.5–15)
PLATELET # BLD: 270 E9/L (ref 130–450)
PMV BLD AUTO: 9.6 FL (ref 7–12)
POTASSIUM SERPL-SCNC: 3.9 MMOL/L (ref 3.5–5)
RBC # BLD: 4.6 E12/L (ref 3.5–5.5)
SODIUM BLD-SCNC: 141 MMOL/L (ref 132–146)
TOTAL PROTEIN: 7 G/DL (ref 6.4–8.3)
TRIGL SERPL-MCNC: 98 MG/DL (ref 0–149)
VLDLC SERPL CALC-MCNC: 20 MG/DL
WBC # BLD: 8.1 E9/L (ref 4.5–11.5)

## 2022-11-28 PROCEDURE — 85027 COMPLETE CBC AUTOMATED: CPT

## 2022-11-28 PROCEDURE — 36415 COLL VENOUS BLD VENIPUNCTURE: CPT

## 2022-11-28 PROCEDURE — 80053 COMPREHEN METABOLIC PANEL: CPT

## 2022-11-28 PROCEDURE — 80061 LIPID PANEL: CPT

## 2023-07-30 ENCOUNTER — HOSPITAL ENCOUNTER (EMERGENCY)
Age: 68
Discharge: HOME | End: 2023-07-30
Payer: MEDICARE

## 2023-07-30 ENCOUNTER — APPOINTMENT (OUTPATIENT)
Dept: GENERAL RADIOLOGY | Age: 68
End: 2023-07-30
Payer: MEDICARE

## 2023-07-30 VITALS
OXYGEN SATURATION: 100 % | TEMPERATURE: 97.6 F | RESPIRATION RATE: 20 BRPM | BODY MASS INDEX: 20.91 KG/M2 | WEIGHT: 118 LBS | SYSTOLIC BLOOD PRESSURE: 141 MMHG | HEIGHT: 63 IN | HEART RATE: 91 BPM | DIASTOLIC BLOOD PRESSURE: 79 MMHG

## 2023-07-30 DIAGNOSIS — G89.29 CHEST WALL PAIN, CHRONIC: Primary | ICD-10-CM

## 2023-07-30 DIAGNOSIS — R07.89 CHEST WALL PAIN, CHRONIC: Primary | ICD-10-CM

## 2023-07-30 LAB
ALBUMIN SERPL-MCNC: 4.5 G/DL (ref 3.5–5.2)
ALP SERPL-CCNC: 91 U/L (ref 35–104)
ALT SERPL-CCNC: 15 U/L (ref 0–32)
ANION GAP SERPL CALCULATED.3IONS-SCNC: 15 MMOL/L (ref 7–16)
AST SERPL-CCNC: 25 U/L (ref 0–31)
BASOPHILS # BLD: 0.06 K/UL (ref 0–0.2)
BASOPHILS NFR BLD: 1 % (ref 0–2)
BILIRUB SERPL-MCNC: 0.4 MG/DL (ref 0–1.2)
BUN SERPL-MCNC: 16 MG/DL (ref 6–23)
CALCIUM SERPL-MCNC: 9.6 MG/DL (ref 8.6–10.2)
CHLORIDE SERPL-SCNC: 105 MMOL/L (ref 98–107)
CO2 SERPL-SCNC: 21 MMOL/L (ref 22–29)
CREAT SERPL-MCNC: 0.9 MG/DL (ref 0.5–1)
D DIMER: <200 NG/ML DDU (ref 0–232)
EOSINOPHIL # BLD: 0.01 K/UL (ref 0.05–0.5)
EOSINOPHILS RELATIVE PERCENT: 0 % (ref 0–6)
ERYTHROCYTE [DISTWIDTH] IN BLOOD BY AUTOMATED COUNT: 13.2 % (ref 11.5–15)
GFR SERPL CREATININE-BSD FRML MDRD: >60 ML/MIN/1.73M2
GLUCOSE SERPL-MCNC: 107 MG/DL (ref 74–99)
HCT VFR BLD AUTO: 40.6 % (ref 34–48)
HGB BLD-MCNC: 14.2 G/DL (ref 11.5–15.5)
IMM GRANULOCYTES # BLD AUTO: 0.03 K/UL (ref 0–0.58)
IMM GRANULOCYTES NFR BLD: 0 % (ref 0–5)
LYMPHOCYTES NFR BLD: 1.48 K/UL (ref 1.5–4)
LYMPHOCYTES RELATIVE PERCENT: 17 % (ref 20–42)
MCH RBC QN AUTO: 31.1 PG (ref 26–35)
MCHC RBC AUTO-ENTMCNC: 35 G/DL (ref 32–34.5)
MCV RBC AUTO: 89 FL (ref 80–99.9)
MONOCYTES NFR BLD: 0.59 K/UL (ref 0.1–0.95)
MONOCYTES NFR BLD: 7 % (ref 2–12)
NEUTROPHILS NFR BLD: 75 % (ref 43–80)
NEUTS SEG NFR BLD: 6.56 K/UL (ref 1.8–7.3)
PLATELET # BLD AUTO: 298 K/UL (ref 130–450)
PMV BLD AUTO: 9.8 FL (ref 7–12)
POTASSIUM SERPL-SCNC: 4.5 MMOL/L (ref 3.5–5)
PROT SERPL-MCNC: 7.3 G/DL (ref 6.4–8.3)
RBC # BLD AUTO: 4.56 M/UL (ref 3.5–5.5)
SODIUM SERPL-SCNC: 141 MMOL/L (ref 132–146)
TROPONIN I SERPL HS-MCNC: 8 NG/L (ref 0–9)
WBC OTHER # BLD: 8.7 K/UL (ref 4.5–11.5)

## 2023-07-30 PROCEDURE — 71045 X-RAY EXAM CHEST 1 VIEW: CPT

## 2023-07-30 PROCEDURE — 84484 ASSAY OF TROPONIN QUANT: CPT

## 2023-07-30 PROCEDURE — 6360000002 HC RX W HCPCS: Performed by: NURSE PRACTITIONER

## 2023-07-30 PROCEDURE — 85027 COMPLETE CBC AUTOMATED: CPT

## 2023-07-30 PROCEDURE — 80053 COMPREHEN METABOLIC PANEL: CPT

## 2023-07-30 PROCEDURE — 99285 EMERGENCY DEPT VISIT HI MDM: CPT

## 2023-07-30 PROCEDURE — 93005 ELECTROCARDIOGRAM TRACING: CPT | Performed by: EMERGENCY MEDICINE

## 2023-07-30 PROCEDURE — 96374 THER/PROPH/DIAG INJ IV PUSH: CPT

## 2023-07-30 PROCEDURE — 85379 FIBRIN DEGRADATION QUANT: CPT

## 2023-07-30 RX ORDER — ORPHENADRINE CITRATE 30 MG/ML
60 INJECTION INTRAMUSCULAR; INTRAVENOUS ONCE
Status: COMPLETED | OUTPATIENT
Start: 2023-07-30 | End: 2023-07-30

## 2023-07-30 RX ORDER — CYCLOBENZAPRINE HCL 10 MG
10 TABLET ORAL 3 TIMES DAILY PRN
Qty: 21 TABLET | Refills: 0 | Status: SHIPPED | OUTPATIENT
Start: 2023-07-30 | End: 2023-08-09

## 2023-07-30 RX ADMIN — ORPHENADRINE CITRATE 60 MG: 60 INJECTION INTRAMUSCULAR; INTRAVENOUS at 21:54

## 2023-07-30 ASSESSMENT — PAIN DESCRIPTION - FREQUENCY: FREQUENCY: INTERMITTENT

## 2023-07-30 ASSESSMENT — PAIN SCALES - GENERAL
PAINLEVEL_OUTOF10: 6
PAINLEVEL_OUTOF10: 7

## 2023-07-30 ASSESSMENT — PAIN - FUNCTIONAL ASSESSMENT: PAIN_FUNCTIONAL_ASSESSMENT: 0-10

## 2023-07-30 ASSESSMENT — PAIN DESCRIPTION - LOCATION: LOCATION: BREAST

## 2023-07-30 ASSESSMENT — PAIN DESCRIPTION - PAIN TYPE: TYPE: ACUTE PAIN

## 2023-07-30 ASSESSMENT — PAIN DESCRIPTION - ONSET: ONSET: ON-GOING

## 2023-07-30 ASSESSMENT — PAIN DESCRIPTION - ORIENTATION: ORIENTATION: LEFT

## 2023-07-30 ASSESSMENT — PAIN DESCRIPTION - DESCRIPTORS: DESCRIPTORS: SPASM;DISCOMFORT

## 2023-07-31 LAB
EKG ATRIAL RATE: 81 BPM
EKG P AXIS: 79 DEGREES
EKG P-R INTERVAL: 120 MS
EKG Q-T INTERVAL: 364 MS
EKG QRS DURATION: 80 MS
EKG QTC CALCULATION (BAZETT): 422 MS
EKG R AXIS: 94 DEGREES
EKG T AXIS: 60 DEGREES
EKG VENTRICULAR RATE: 81 BPM

## 2023-07-31 PROCEDURE — 93010 ELECTROCARDIOGRAM REPORT: CPT | Performed by: INTERNAL MEDICINE

## 2023-07-31 NOTE — DISCHARGE INSTRUCTIONS
All of your heart work-up is normal.  This is likely due to your chronic muscle spasm. You may use Flexeril 3 times daily as needed for muscle spasm, please do not drink alcohol or drive on this medication. Please follow-up with your PCP for reevaluation. If anything changes you may return to the ER.

## 2023-07-31 NOTE — ED PROVIDER NOTES
emergency room for. Patient was instructed to return to the ER for any new or worsening symptoms. Additional discharge instructions were given verbally. All questions were answered. Patient is comfortable and agreeable with discharge plan. Patient in no acute distress and non-toxic in appearance. FINAL IMPRESSION      1. Chest wall pain, chronic          DISPOSITION/PLAN     DISPOSITION Decision To Discharge 07/30/2023 11:30:42 PM    PATIENT REFERRED TO:  No follow-up provider specified.     DISCHARGE MEDICATIONS:  Discharge Medication List as of 7/30/2023 11:32 PM        START taking these medications    Details   cyclobenzaprine (FLEXERIL) 10 MG tablet Take 1 tablet by mouth 3 times daily as needed for Muscle spasms, Disp-21 tablet, R-0Normal             DISCONTINUED MEDICATIONS:  Discharge Medication List as of 7/30/2023 11:32 PM        STOP taking these medications       dicyclomine (BENTYL) 10 MG capsule Comments:   Reason for Stopping:         tretinoin (RETIN-A) 0.025 % cream Comments:   Reason for Stopping:         acetaminophen (TYLENOL) 325 MG tablet Comments:   Reason for Stopping:                    (Please note that portions of this note were completed with a voice recognition program.  Efforts were made to edit the dictations but occasionally words are mis-transcribed.)    SCOTTY Buckley CNP (electronically signed)      SCOTTY Buckley CNP  08/02/23 2038

## 2023-08-03 ENCOUNTER — TELEPHONE (OUTPATIENT)
Dept: FAMILY MEDICINE CLINIC | Age: 68
End: 2023-08-03

## 2023-08-03 NOTE — TELEPHONE ENCOUNTER
-- DO NOT REPLY / DO NOT REPLY ALL --  -- Message is from the [facility]--    Provider paged via [employer] - The below message was copied and pasted from a [employer] page:    10/18/2020 10:07:09 PM      Initiated: 10/18/2020 10:02:18 PM Message Sent: 10/18/2020 10:07:09 PM   [facility]   Kilo Corbin MD 0\" tooltip-popup-delay=\"500\" ruprvdo-jirpea-sy-body=\"true\" uib-tooltip=\"\" tooltip-placement=\"auto top\" tooltip-enable=\"false\" psx-ellipsis=\"\"Miquel Jiménez MD   Secure Text   [phone] PATIENT NUMBER -------------------------------- ACC NURSE LINE (IF QUESTIONS ONLY - [phone]) FROM: VANNA (DAUGHTER) DR. DR. VIANCA DAS [facility],  1927, MR # [id number] FEELS LIKE SOMETHING UNDER HER EYELID, THE PATIENT HAS REDNESS TO THE LEFT EYE, REDNESS SURROUNDING THE LEFT EYE, CLEAR DRAINAGE FROM THE LEFT EYE, ITCHING TO THE LEFT EYE. THE PATIENT WAS SEEN AT [facility] IN [facility] DUE TO HAVING AN INFECTION TO THE LEFT EYE AND B/P-168/84. VANNA STATED THE PATIENT RECEIVED A PRESCRIPTION FOR CIPROFLOXACIN EYE GTTS, BUT IT CAN NOT BE FILLED UNTIL TOMORROW. VANNA CALLED TO FIND OUT WHAT CAN BE DONE TO TREAT THE EYE. THE PATIENT WAS TRIAGED, AND HAS A CALL PCP NOW DISPOSITION. PLEASE CALL VANNA [phone]. THANK YOU. NURY, ACC RN*       Pt's  called in stating that someone in the office was supposed to call Panda Hagen back from this morning. I stated to  that we did not receive a call because the last thing in pt's chart that was documented was her ER visit on 7/30/2023. I explained to him that she might have talked to our central scheduling. He said hold on I am going to ask her. He asked her and she yea I spoke to the office. He said yea someone from the office was supposed to call her. I told him to hold on and asked around the office to everyone and nobody talked to Panda Hagen. I told him that. He asked who I was and I told him my name. Asked if I was in [facility] office and said yes. He said okay well she needs to be seen by Dr James Metcalf because she is still in pain and taking Flexeril three times a day. I said I can send a message to her because she is in with pt's and is not here tomorrow or all of next week. He said okay. I said if she still is in that much pain take her to urgent/walk in or ER. He said okay. I send I can still send the message to Dr Ashley Inman. He said okay do that.      Please Advise

## 2023-08-05 ENCOUNTER — HOSPITAL ENCOUNTER (EMERGENCY)
Age: 68
Discharge: HOME | DRG: 149 | End: 2023-08-05
Attending: STUDENT IN AN ORGANIZED HEALTH CARE EDUCATION/TRAINING PROGRAM
Payer: MEDICARE

## 2023-08-05 ENCOUNTER — APPOINTMENT (OUTPATIENT)
Dept: CT IMAGING | Age: 68
DRG: 149 | End: 2023-08-05
Payer: MEDICARE

## 2023-08-05 ENCOUNTER — APPOINTMENT (OUTPATIENT)
Dept: GENERAL RADIOLOGY | Age: 68
DRG: 149 | End: 2023-08-05
Payer: MEDICARE

## 2023-08-05 VITALS
DIASTOLIC BLOOD PRESSURE: 78 MMHG | SYSTOLIC BLOOD PRESSURE: 137 MMHG | TEMPERATURE: 97.9 F | RESPIRATION RATE: 18 BRPM | HEART RATE: 92 BPM | OXYGEN SATURATION: 100 %

## 2023-08-05 DIAGNOSIS — R42 DIZZINESS: Primary | ICD-10-CM

## 2023-08-05 LAB
ALBUMIN SERPL-MCNC: 4.6 G/DL (ref 3.5–5.2)
ALP SERPL-CCNC: 98 U/L (ref 35–104)
ALT SERPL-CCNC: 12 U/L (ref 0–32)
ANION GAP SERPL CALCULATED.3IONS-SCNC: 18 MMOL/L (ref 7–16)
AST SERPL-CCNC: 22 U/L (ref 0–31)
BASOPHILS # BLD: 0.04 K/UL (ref 0–0.2)
BASOPHILS NFR BLD: 1 % (ref 0–2)
BILIRUB SERPL-MCNC: 0.6 MG/DL (ref 0–1.2)
BUN SERPL-MCNC: 30 MG/DL (ref 6–23)
CALCIUM SERPL-MCNC: 9.9 MG/DL (ref 8.6–10.2)
CHLORIDE SERPL-SCNC: 103 MMOL/L (ref 98–107)
CO2 SERPL-SCNC: 19 MMOL/L (ref 22–29)
CREAT SERPL-MCNC: 0.9 MG/DL (ref 0.5–1)
EKG ATRIAL RATE: 88 BPM
EKG P AXIS: 74 DEGREES
EKG P-R INTERVAL: 122 MS
EKG Q-T INTERVAL: 354 MS
EKG QRS DURATION: 80 MS
EKG QTC CALCULATION (BAZETT): 428 MS
EKG R AXIS: 65 DEGREES
EKG T AXIS: 52 DEGREES
EKG VENTRICULAR RATE: 88 BPM
EOSINOPHIL # BLD: 0.05 K/UL (ref 0.05–0.5)
EOSINOPHILS RELATIVE PERCENT: 1 % (ref 0–6)
ERYTHROCYTE [DISTWIDTH] IN BLOOD BY AUTOMATED COUNT: 12.9 % (ref 11.5–15)
GFR SERPL CREATININE-BSD FRML MDRD: >60 ML/MIN/1.73M2
GLUCOSE SERPL-MCNC: 105 MG/DL (ref 74–99)
HCT VFR BLD AUTO: 43.1 % (ref 34–48)
HGB BLD-MCNC: 14.6 G/DL (ref 11.5–15.5)
IMM GRANULOCYTES # BLD AUTO: <0.03 K/UL (ref 0–0.58)
IMM GRANULOCYTES NFR BLD: 0 % (ref 0–5)
LYMPHOCYTES NFR BLD: 1.42 K/UL (ref 1.5–4)
LYMPHOCYTES RELATIVE PERCENT: 19 % (ref 20–42)
MCH RBC QN AUTO: 30.6 PG (ref 26–35)
MCHC RBC AUTO-ENTMCNC: 33.9 G/DL (ref 32–34.5)
MCV RBC AUTO: 90.4 FL (ref 80–99.9)
MONOCYTES NFR BLD: 0.59 K/UL (ref 0.1–0.95)
MONOCYTES NFR BLD: 8 % (ref 2–12)
NEUTROPHILS NFR BLD: 71 % (ref 43–80)
NEUTS SEG NFR BLD: 5.23 K/UL (ref 1.8–7.3)
PLATELET # BLD AUTO: 228 K/UL (ref 130–450)
PLATELET CONFIRMATION: NORMAL
PMV BLD AUTO: 10.8 FL (ref 7–12)
POTASSIUM SERPL-SCNC: 4 MMOL/L (ref 3.5–5)
PROT SERPL-MCNC: 7.4 G/DL (ref 6.4–8.3)
RBC # BLD AUTO: 4.77 M/UL (ref 3.5–5.5)
SODIUM SERPL-SCNC: 140 MMOL/L (ref 132–146)
TROPONIN I SERPL HS-MCNC: 7 NG/L (ref 0–9)
TROPONIN I SERPL HS-MCNC: 8 NG/L (ref 0–9)
WBC OTHER # BLD: 7.4 K/UL (ref 4.5–11.5)

## 2023-08-05 PROCEDURE — 85025 COMPLETE CBC W/AUTO DIFF WBC: CPT

## 2023-08-05 PROCEDURE — 6360000004 HC RX CONTRAST MEDICATION: Performed by: RADIOLOGY

## 2023-08-05 PROCEDURE — 80053 COMPREHEN METABOLIC PANEL: CPT

## 2023-08-05 PROCEDURE — 70498 CT ANGIOGRAPHY NECK: CPT

## 2023-08-05 PROCEDURE — 71045 X-RAY EXAM CHEST 1 VIEW: CPT

## 2023-08-05 PROCEDURE — 70496 CT ANGIOGRAPHY HEAD: CPT

## 2023-08-05 PROCEDURE — 6370000000 HC RX 637 (ALT 250 FOR IP): Performed by: STUDENT IN AN ORGANIZED HEALTH CARE EDUCATION/TRAINING PROGRAM

## 2023-08-05 PROCEDURE — 93005 ELECTROCARDIOGRAM TRACING: CPT | Performed by: STUDENT IN AN ORGANIZED HEALTH CARE EDUCATION/TRAINING PROGRAM

## 2023-08-05 PROCEDURE — 84484 ASSAY OF TROPONIN QUANT: CPT

## 2023-08-05 PROCEDURE — 99285 EMERGENCY DEPT VISIT HI MDM: CPT

## 2023-08-05 RX ORDER — LIDOCAINE 50 MG/G
1 PATCH TOPICAL DAILY
Qty: 10 PATCH | Refills: 0 | Status: SHIPPED | OUTPATIENT
Start: 2023-08-05 | End: 2023-08-08

## 2023-08-05 RX ORDER — MECLIZINE HYDROCHLORIDE 25 MG/1
25 TABLET ORAL 3 TIMES DAILY PRN
Qty: 15 TABLET | Refills: 0 | Status: ON HOLD | OUTPATIENT
Start: 2023-08-05 | End: 2023-08-09 | Stop reason: HOSPADM

## 2023-08-05 RX ORDER — MECLIZINE HCL 12.5 MG/1
25 TABLET ORAL ONCE
Status: COMPLETED | OUTPATIENT
Start: 2023-08-05 | End: 2023-08-05

## 2023-08-05 RX ADMIN — MECLIZINE 25 MG: 12.5 TABLET ORAL at 13:50

## 2023-08-05 RX ADMIN — IOPAMIDOL 100 ML: 755 INJECTION, SOLUTION INTRAVENOUS at 16:38

## 2023-08-05 ASSESSMENT — ENCOUNTER SYMPTOMS
DIARRHEA: 0
VOMITING: 0
ABDOMINAL PAIN: 0
BACK PAIN: 0
NAUSEA: 0
COUGH: 0
SHORTNESS OF BREATH: 0
COLOR CHANGE: 0

## 2023-08-05 NOTE — ED NOTES
Pt ambulatory to restroom, tolerated well. Pt reports improved dizziness at this time. Skin pwd.  Respirations equal and non labored     Sahara Lozoya RN  08/05/23 9440

## 2023-08-07 ENCOUNTER — APPOINTMENT (OUTPATIENT)
Dept: CT IMAGING | Age: 68
DRG: 149 | End: 2023-08-07
Payer: MEDICARE

## 2023-08-07 ENCOUNTER — HOSPITAL ENCOUNTER (INPATIENT)
Age: 68
LOS: 2 days | Discharge: HOME | DRG: 149 | End: 2023-08-09
Attending: EMERGENCY MEDICINE | Admitting: INTERNAL MEDICINE
Payer: MEDICARE

## 2023-08-07 DIAGNOSIS — R42 DIZZINESS: ICD-10-CM

## 2023-08-07 DIAGNOSIS — R42 VERTIGO: Primary | ICD-10-CM

## 2023-08-07 LAB
ANION GAP SERPL CALCULATED.3IONS-SCNC: 11 MMOL/L (ref 7–16)
BASOPHILS # BLD: 0.04 K/UL (ref 0–0.2)
BASOPHILS NFR BLD: 1 % (ref 0–2)
BUN SERPL-MCNC: 29 MG/DL (ref 6–23)
CALCIUM SERPL-MCNC: 9.6 MG/DL (ref 8.6–10.2)
CHLORIDE SERPL-SCNC: 105 MMOL/L (ref 98–107)
CO2 SERPL-SCNC: 25 MMOL/L (ref 22–29)
CREAT SERPL-MCNC: 0.8 MG/DL (ref 0.5–1)
EKG ATRIAL RATE: 88 BPM
EKG P AXIS: 74 DEGREES
EKG P-R INTERVAL: 122 MS
EKG Q-T INTERVAL: 354 MS
EKG QRS DURATION: 80 MS
EKG QTC CALCULATION (BAZETT): 428 MS
EKG R AXIS: 65 DEGREES
EKG T AXIS: 52 DEGREES
EKG VENTRICULAR RATE: 88 BPM
EOSINOPHIL # BLD: 0.04 K/UL (ref 0.05–0.5)
EOSINOPHILS RELATIVE PERCENT: 1 % (ref 0–6)
ERYTHROCYTE [DISTWIDTH] IN BLOOD BY AUTOMATED COUNT: 13 % (ref 11.5–15)
GFR SERPL CREATININE-BSD FRML MDRD: >60 ML/MIN/1.73M2
GLUCOSE SERPL-MCNC: 118 MG/DL (ref 74–99)
HCT VFR BLD AUTO: 44.2 % (ref 34–48)
HGB BLD-MCNC: 14 G/DL (ref 11.5–15.5)
IMM GRANULOCYTES # BLD AUTO: 0.03 K/UL (ref 0–0.58)
IMM GRANULOCYTES NFR BLD: 0 % (ref 0–5)
LYMPHOCYTES NFR BLD: 0.99 K/UL (ref 1.5–4)
LYMPHOCYTES RELATIVE PERCENT: 12 % (ref 20–42)
MCH RBC QN AUTO: 30.2 PG (ref 26–35)
MCHC RBC AUTO-ENTMCNC: 31.7 G/DL (ref 32–34.5)
MCV RBC AUTO: 95.5 FL (ref 80–99.9)
MONOCYTES NFR BLD: 0.53 K/UL (ref 0.1–0.95)
MONOCYTES NFR BLD: 6 % (ref 2–12)
NEUTROPHILS NFR BLD: 81 % (ref 43–80)
NEUTS SEG NFR BLD: 6.82 K/UL (ref 1.8–7.3)
PLATELET # BLD AUTO: 306 K/UL (ref 130–450)
PMV BLD AUTO: 9.7 FL (ref 7–12)
POTASSIUM SERPL-SCNC: 4.5 MMOL/L (ref 3.5–5)
RBC # BLD AUTO: 4.63 M/UL (ref 3.5–5.5)
SODIUM SERPL-SCNC: 141 MMOL/L (ref 132–146)
WBC OTHER # BLD: 8.5 K/UL (ref 4.5–11.5)

## 2023-08-07 PROCEDURE — 80048 BASIC METABOLIC PNL TOTAL CA: CPT

## 2023-08-07 PROCEDURE — 6370000000 HC RX 637 (ALT 250 FOR IP): Performed by: INTERNAL MEDICINE

## 2023-08-07 PROCEDURE — 93005 ELECTROCARDIOGRAM TRACING: CPT

## 2023-08-07 PROCEDURE — 2140000000 HC CCU INTERMEDIATE R&B

## 2023-08-07 PROCEDURE — 6370000000 HC RX 637 (ALT 250 FOR IP)

## 2023-08-07 PROCEDURE — 81001 URINALYSIS AUTO W/SCOPE: CPT

## 2023-08-07 PROCEDURE — 85025 COMPLETE CBC W/AUTO DIFF WBC: CPT

## 2023-08-07 PROCEDURE — 99285 EMERGENCY DEPT VISIT HI MDM: CPT

## 2023-08-07 PROCEDURE — 93010 ELECTROCARDIOGRAM REPORT: CPT | Performed by: INTERNAL MEDICINE

## 2023-08-07 PROCEDURE — 70450 CT HEAD/BRAIN W/O DYE: CPT

## 2023-08-07 PROCEDURE — 6370000000 HC RX 637 (ALT 250 FOR IP): Performed by: EMERGENCY MEDICINE

## 2023-08-07 RX ORDER — ASPIRIN 81 MG/1
81 TABLET ORAL DAILY
Status: DISCONTINUED | OUTPATIENT
Start: 2023-08-07 | End: 2023-08-09 | Stop reason: HOSPADM

## 2023-08-07 RX ORDER — HYDROXYZINE PAMOATE 25 MG/1
25 CAPSULE ORAL ONCE
Status: COMPLETED | OUTPATIENT
Start: 2023-08-07 | End: 2023-08-07

## 2023-08-07 RX ORDER — MECLIZINE HCL 12.5 MG/1
12.5 TABLET ORAL ONCE
Status: COMPLETED | OUTPATIENT
Start: 2023-08-07 | End: 2023-08-07

## 2023-08-07 RX ADMIN — HYDROXYZINE PAMOATE 25 MG: 25 CAPSULE ORAL at 22:03

## 2023-08-07 RX ADMIN — ASPIRIN 81 MG: 81 TABLET, COATED ORAL at 21:25

## 2023-08-07 RX ADMIN — MECLIZINE 12.5 MG: 12.5 TABLET ORAL at 15:36

## 2023-08-07 ASSESSMENT — PAIN - FUNCTIONAL ASSESSMENT: PAIN_FUNCTIONAL_ASSESSMENT: NONE - DENIES PAIN

## 2023-08-07 NOTE — ED PROVIDER NOTES
%    Basophils % 1 0.0 - 2.0 %    Immature Granulocytes 0 0.0 - 5.0 %    Neutrophils Absolute 3.76 1.80 - 7.30 k/uL    Lymphocytes Absolute 1.21 (L) 1.50 - 4.00 k/uL    Monocytes Absolute 0.54 0.10 - 0.95 k/uL    Eosinophils Absolute 0.14 0.05 - 0.50 k/uL    Basophils Absolute 0.04 0.00 - 0.20 k/uL    Absolute Immature Granulocyte <0.03 0.00 - 0.58 k/uL   Vitamin D 25 Hydroxy   Result Value Ref Range    Vit D, 25-Hydroxy 16.3 (L) 30.0 - 100.0 ng/mL   POCT Glucose   Result Value Ref Range    Meter Glucose 85 74 - 99 mg/dL   POCT Glucose   Result Value Ref Range    Meter Glucose 87 74 - 99 mg/dL   EKG 12 Lead   Result Value Ref Range    Ventricular Rate 83 BPM    Atrial Rate 83 BPM    P-R Interval 118 ms    QRS Duration 76 ms    Q-T Interval 362 ms    QTc Calculation (Bazett) 425 ms    P Axis 69 degrees    R Axis 64 degrees    T Axis 43 degrees       RADIOLOGY:   Non-plain film images such as CT, Ultrasound and MRI are read by the radiologist. Plain radiographic images are visualized and preliminarily interpreted by the ED Provider with the below findings:    No acute obvious intracranial hemorrhage    Interpretation per the Radiologist below, if available at the time of this note:    CT HEAD WO CONTRAST   Final Result   No acute intracranial abnormality. XR CHEST PORTABLE    Result Date: 8/5/2023  EXAMINATION: ONE XRAY VIEW OF THE CHEST 8/5/2023 1:38 pm COMPARISON: None. HISTORY: ORDERING SYSTEM PROVIDED HISTORY: dizziness TECHNOLOGIST PROVIDED HISTORY: Reason for exam:->dizziness FINDINGS: The lungs are without acute focal process. There is no effusion or pneumothorax. The cardiomediastinal silhouette is without acute process. The osseous structures are without acute process. No acute process.      CTA NECK W CONTRAST    Result Date: 8/5/2023  EXAMINATION: CTA OF THE HEAD WITH CONTRAST; CTA OF THE NECK 8/5/2023 4:12 pm: TECHNIQUE: CTA of the head/brain was performed with the administration of 1

## 2023-08-08 ENCOUNTER — APPOINTMENT (OUTPATIENT)
Dept: MRI IMAGING | Age: 68
DRG: 149 | End: 2023-08-08
Payer: MEDICARE

## 2023-08-08 LAB
BACTERIA URNS QL MICRO: ABNORMAL
BILIRUB UR QL STRIP: NEGATIVE
CLARITY UR: CLEAR
COLOR UR: YELLOW
EKG ATRIAL RATE: 83 BPM
EKG P AXIS: 69 DEGREES
EKG P-R INTERVAL: 118 MS
EKG Q-T INTERVAL: 362 MS
EKG QRS DURATION: 76 MS
EKG QTC CALCULATION (BAZETT): 425 MS
EKG R AXIS: 64 DEGREES
EKG T AXIS: 43 DEGREES
EKG VENTRICULAR RATE: 83 BPM
GLUCOSE UR STRIP-MCNC: NEGATIVE MG/DL
HGB UR QL STRIP.AUTO: ABNORMAL
KETONES UR STRIP-MCNC: 40 MG/DL
LEUKOCYTE ESTERASE UR QL STRIP: NEGATIVE
METER GLUCOSE: 85 MG/DL (ref 74–99)
METER GLUCOSE: 87 MG/DL (ref 74–99)
NITRITE UR QL STRIP: NEGATIVE
PH UR STRIP: 6.5 [PH] (ref 5–9)
PROT UR STRIP-MCNC: NEGATIVE MG/DL
RBC #/AREA URNS HPF: ABNORMAL /HPF
SP GR UR STRIP: 1.02 (ref 1–1.03)
UROBILINOGEN UR STRIP-ACNC: 0.2 EU/DL (ref 0–1)
WBC #/AREA URNS HPF: ABNORMAL /HPF

## 2023-08-08 PROCEDURE — 2580000003 HC RX 258: Performed by: INTERNAL MEDICINE

## 2023-08-08 PROCEDURE — 6360000002 HC RX W HCPCS: Performed by: INTERNAL MEDICINE

## 2023-08-08 PROCEDURE — 2140000000 HC CCU INTERMEDIATE R&B

## 2023-08-08 PROCEDURE — 70551 MRI BRAIN STEM W/O DYE: CPT

## 2023-08-08 PROCEDURE — 82962 GLUCOSE BLOOD TEST: CPT

## 2023-08-08 PROCEDURE — 93010 ELECTROCARDIOGRAM REPORT: CPT | Performed by: INTERNAL MEDICINE

## 2023-08-08 PROCEDURE — 6370000000 HC RX 637 (ALT 250 FOR IP): Performed by: INTERNAL MEDICINE

## 2023-08-08 RX ORDER — ACETAMINOPHEN 325 MG/1
650 TABLET ORAL EVERY 6 HOURS PRN
Status: DISCONTINUED | OUTPATIENT
Start: 2023-08-08 | End: 2023-08-09 | Stop reason: HOSPADM

## 2023-08-08 RX ORDER — SODIUM CHLORIDE 0.9 % (FLUSH) 0.9 %
5-40 SYRINGE (ML) INJECTION PRN
Status: DISCONTINUED | OUTPATIENT
Start: 2023-08-08 | End: 2023-08-09 | Stop reason: HOSPADM

## 2023-08-08 RX ORDER — ACETAMINOPHEN 650 MG/1
650 SUPPOSITORY RECTAL EVERY 6 HOURS PRN
Status: DISCONTINUED | OUTPATIENT
Start: 2023-08-08 | End: 2023-08-09 | Stop reason: HOSPADM

## 2023-08-08 RX ORDER — HYDROXYZINE PAMOATE 25 MG/1
25 CAPSULE ORAL
Status: DISCONTINUED | OUTPATIENT
Start: 2023-08-08 | End: 2023-08-09 | Stop reason: HOSPADM

## 2023-08-08 RX ORDER — POLYETHYLENE GLYCOL 3350 17 G/17G
17 POWDER, FOR SOLUTION ORAL DAILY PRN
Status: DISCONTINUED | OUTPATIENT
Start: 2023-08-08 | End: 2023-08-09 | Stop reason: HOSPADM

## 2023-08-08 RX ORDER — SODIUM CHLORIDE 9 MG/ML
INJECTION, SOLUTION INTRAVENOUS PRN
Status: DISCONTINUED | OUTPATIENT
Start: 2023-08-08 | End: 2023-08-09 | Stop reason: HOSPADM

## 2023-08-08 RX ORDER — DIPHENHYDRAMINE HCL 25 MG
50 TABLET ORAL ONCE
Status: COMPLETED | OUTPATIENT
Start: 2023-08-08 | End: 2023-08-08

## 2023-08-08 RX ORDER — SODIUM CHLORIDE 0.9 % (FLUSH) 0.9 %
5-40 SYRINGE (ML) INJECTION EVERY 12 HOURS SCHEDULED
Status: DISCONTINUED | OUTPATIENT
Start: 2023-08-08 | End: 2023-08-09 | Stop reason: HOSPADM

## 2023-08-08 RX ORDER — ENOXAPARIN SODIUM 100 MG/ML
40 INJECTION SUBCUTANEOUS DAILY
Status: DISCONTINUED | OUTPATIENT
Start: 2023-08-08 | End: 2023-08-09 | Stop reason: HOSPADM

## 2023-08-08 RX ORDER — ONDANSETRON 4 MG/1
4 TABLET, ORALLY DISINTEGRATING ORAL EVERY 8 HOURS PRN
Status: DISCONTINUED | OUTPATIENT
Start: 2023-08-08 | End: 2023-08-09 | Stop reason: HOSPADM

## 2023-08-08 RX ORDER — ONDANSETRON 2 MG/ML
4 INJECTION INTRAMUSCULAR; INTRAVENOUS EVERY 6 HOURS PRN
Status: DISCONTINUED | OUTPATIENT
Start: 2023-08-08 | End: 2023-08-09 | Stop reason: HOSPADM

## 2023-08-08 RX ORDER — SODIUM CHLORIDE 9 MG/ML
INJECTION, SOLUTION INTRAVENOUS CONTINUOUS
Status: DISCONTINUED | OUTPATIENT
Start: 2023-08-08 | End: 2023-08-09 | Stop reason: HOSPADM

## 2023-08-08 RX ADMIN — SODIUM CHLORIDE: 9 INJECTION, SOLUTION INTRAVENOUS at 04:00

## 2023-08-08 RX ADMIN — SODIUM CHLORIDE: 9 INJECTION, SOLUTION INTRAVENOUS at 15:20

## 2023-08-08 RX ADMIN — ASPIRIN 81 MG: 81 TABLET, COATED ORAL at 08:45

## 2023-08-08 RX ADMIN — DIPHENHYDRAMINE HYDROCHLORIDE 50 MG: 25 TABLET ORAL at 12:30

## 2023-08-08 RX ADMIN — ENOXAPARIN SODIUM 40 MG: 100 INJECTION SUBCUTANEOUS at 08:45

## 2023-08-08 RX ADMIN — SODIUM CHLORIDE, PRESERVATIVE FREE 10 ML: 5 INJECTION INTRAVENOUS at 08:46

## 2023-08-08 RX ADMIN — HYDROXYZINE PAMOATE 25 MG: 25 CAPSULE ORAL at 22:33

## 2023-08-08 RX ADMIN — SODIUM CHLORIDE: 9 INJECTION, SOLUTION INTRAVENOUS at 22:35

## 2023-08-08 NOTE — ED NOTES
Handoff N2N report given to [id number] [facility] (6th flr).      Bev Vera RN  08/08/23 2056

## 2023-08-08 NOTE — CARE COORDINATION
Social Work /Transition of Care:    Pt presents to the ED secondary to dizziness from home. Pt is a full code. Pt is admitted inpatient with dizziness. Pt has a neurology consult and a an MRI ordered. SW met with pt who was sitting up in bed, alert and oriented x3. Pt reports she and her  live in a 1 floor home with 2 steps at the entrance. Pt reports no DME and no hx of HHC/BLAYNE. Pt reports she is independent with ADLs and drives. Pt's PCP is Dr Shekhar Silverman and she uses [facility] in [place] to fill her prescriptions. Pt plan is to return home up on discharge. FAREED/SHIRA to follow for any discharge needs.
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
## 2023-08-09 VITALS
SYSTOLIC BLOOD PRESSURE: 125 MMHG | HEIGHT: 62 IN | OXYGEN SATURATION: 99 % | HEART RATE: 65 BPM | WEIGHT: 115 LBS | RESPIRATION RATE: 16 BRPM | TEMPERATURE: 97.4 F | BODY MASS INDEX: 21.16 KG/M2 | DIASTOLIC BLOOD PRESSURE: 69 MMHG

## 2023-08-09 LAB
25(OH)D3 SERPL-MCNC: 16.3 NG/ML (ref 30–100)
ALBUMIN SERPL-MCNC: 4.1 G/DL (ref 3.5–5.2)
ALP SERPL-CCNC: 78 U/L (ref 35–104)
ALT SERPL-CCNC: 15 U/L (ref 0–32)
ANION GAP SERPL CALCULATED.3IONS-SCNC: 9 MMOL/L (ref 7–16)
AST SERPL-CCNC: 17 U/L (ref 0–31)
BASOPHILS # BLD: 0.04 K/UL (ref 0–0.2)
BASOPHILS NFR BLD: 1 % (ref 0–2)
BILIRUB SERPL-MCNC: 0.4 MG/DL (ref 0–1.2)
BUN SERPL-MCNC: 14 MG/DL (ref 6–23)
CALCIUM SERPL-MCNC: 8.9 MG/DL (ref 8.6–10.2)
CHLORIDE SERPL-SCNC: 107 MMOL/L (ref 98–107)
CO2 SERPL-SCNC: 22 MMOL/L (ref 22–29)
CREAT SERPL-MCNC: 0.8 MG/DL (ref 0.5–1)
EOSINOPHIL # BLD: 0.14 K/UL (ref 0.05–0.5)
EOSINOPHILS RELATIVE PERCENT: 3 % (ref 0–6)
ERYTHROCYTE [DISTWIDTH] IN BLOOD BY AUTOMATED COUNT: 12.7 % (ref 11.5–15)
GFR SERPL CREATININE-BSD FRML MDRD: >60 ML/MIN/1.73M2
GLUCOSE SERPL-MCNC: 86 MG/DL (ref 74–99)
HCT VFR BLD AUTO: 42.9 % (ref 34–48)
HGB BLD-MCNC: 13.8 G/DL (ref 11.5–15.5)
IMM GRANULOCYTES # BLD AUTO: <0.03 K/UL (ref 0–0.58)
IMM GRANULOCYTES NFR BLD: 0 % (ref 0–5)
LYMPHOCYTES NFR BLD: 1.21 K/UL (ref 1.5–4)
LYMPHOCYTES RELATIVE PERCENT: 21 % (ref 20–42)
MAGNESIUM SERPL-MCNC: 2.1 MG/DL (ref 1.6–2.6)
MCH RBC QN AUTO: 30.3 PG (ref 26–35)
MCHC RBC AUTO-ENTMCNC: 32.2 G/DL (ref 32–34.5)
MCV RBC AUTO: 94.3 FL (ref 80–99.9)
MONOCYTES NFR BLD: 0.54 K/UL (ref 0.1–0.95)
MONOCYTES NFR BLD: 10 % (ref 2–12)
NEUTROPHILS NFR BLD: 66 % (ref 43–80)
NEUTS SEG NFR BLD: 3.76 K/UL (ref 1.8–7.3)
PLATELET # BLD AUTO: 258 K/UL (ref 130–450)
PMV BLD AUTO: 9.8 FL (ref 7–12)
POTASSIUM SERPL-SCNC: 3.5 MMOL/L (ref 3.5–5)
PROT SERPL-MCNC: 6.4 G/DL (ref 6.4–8.3)
RBC # BLD AUTO: 4.55 M/UL (ref 3.5–5.5)
SODIUM SERPL-SCNC: 138 MMOL/L (ref 132–146)
WBC OTHER # BLD: 5.7 K/UL (ref 4.5–11.5)

## 2023-08-09 PROCEDURE — 97530 THERAPEUTIC ACTIVITIES: CPT

## 2023-08-09 PROCEDURE — 80053 COMPREHEN METABOLIC PANEL: CPT

## 2023-08-09 PROCEDURE — 36415 COLL VENOUS BLD VENIPUNCTURE: CPT

## 2023-08-09 PROCEDURE — 99222 1ST HOSP IP/OBS MODERATE 55: CPT | Performed by: OTOLARYNGOLOGY

## 2023-08-09 PROCEDURE — 97161 PT EVAL LOW COMPLEX 20 MIN: CPT

## 2023-08-09 PROCEDURE — 99222 1ST HOSP IP/OBS MODERATE 55: CPT | Performed by: PSYCHIATRY & NEUROLOGY

## 2023-08-09 PROCEDURE — 83735 ASSAY OF MAGNESIUM: CPT

## 2023-08-09 PROCEDURE — 85025 COMPLETE CBC W/AUTO DIFF WBC: CPT

## 2023-08-09 PROCEDURE — 6370000000 HC RX 637 (ALT 250 FOR IP): Performed by: INTERNAL MEDICINE

## 2023-08-09 PROCEDURE — 2580000003 HC RX 258: Performed by: INTERNAL MEDICINE

## 2023-08-09 PROCEDURE — 6360000002 HC RX W HCPCS: Performed by: INTERNAL MEDICINE

## 2023-08-09 PROCEDURE — 82306 VITAMIN D 25 HYDROXY: CPT

## 2023-08-09 PROCEDURE — 97165 OT EVAL LOW COMPLEX 30 MIN: CPT

## 2023-08-09 RX ORDER — MELATONIN
1000 DAILY
Qty: 30 TABLET | Refills: 5 | COMMUNITY
Start: 2023-08-09

## 2023-08-09 RX ADMIN — SODIUM CHLORIDE, PRESERVATIVE FREE 10 ML: 5 INJECTION INTRAVENOUS at 08:23

## 2023-08-09 RX ADMIN — ASPIRIN 81 MG: 81 TABLET, COATED ORAL at 08:23

## 2023-08-09 RX ADMIN — SODIUM CHLORIDE: 9 INJECTION, SOLUTION INTRAVENOUS at 11:35

## 2023-08-09 RX ADMIN — ENOXAPARIN SODIUM 40 MG: 100 INJECTION SUBCUTANEOUS at 08:23

## 2023-08-09 ASSESSMENT — ENCOUNTER SYMPTOMS
COLOR CHANGE: 0
SINUS PRESSURE: 0
WHEEZING: 0
CHOKING: 0
SORE THROAT: 0
RHINORRHEA: 0
GASTROINTESTINAL NEGATIVE: 1
SINUS PAIN: 0
COUGH: 0
VOICE CHANGE: 0
STRIDOR: 0
TROUBLE SWALLOWING: 0

## 2023-08-09 NOTE — FLOWSHEET NOTE
Verbal and written dc instructions were given to pt with  at bedside. IV's removed and heart monitor returned to nurses station. All questions answered to satisfaction. Medications and follow ups reviewed with pt. Pt dc'd with following belongings. 08/08/23 1507   Belongings   Vision - Corrective Lenses Eyeglasses   Hearing Aid None   Clothing Footwear;Shirt; Shorts;Socks; Undergarments   Jewelry None   Electronic Devices Cell Phone;   Weapons (Notify Protective Services/Security) None   Other Valuables Wallet;Purse

## 2023-08-09 NOTE — PATIENT CARE CONFERENCE
P Quality Flow/Interdisciplinary Rounds Progress Note        Quality Flow Rounds held on August 9, 2023    Disciplines Attending:  Bedside Nurse, , , and Nursing Unit Leadership    Chago Muir was admitted on 8/7/2023  2:51 PM    Anticipated Discharge Date:       Disposition:    Edison Score:  Edsion Scale Score: 19    Readmission Risk              Risk of Unplanned Readmission:  10           Discussed patient goal for the day, patient clinical progression, and barriers to discharge.   The following Goal(s) of the Day/Commitment(s) have been identified: consults to see      Birdie Navarro RN  August 9, 2023

## 2023-08-09 NOTE — CARE COORDINATION
8/9/23  Spoke with patient regarding transition of care. Patient admitted for dizziness and vertigo. Patient is pending a consult with Neurology and otolaryngology as well as therapy evsiva. Patient voices she lives at home with her  in a 1 story house. Patient is independent with all ADL's and drives. Patient does not use any DME. PCP is Dr. Linda Onael and pharmacy choice is [facility] on [facility]. Discharge goal is home with no needs when medically stable pending course of treatment. /CM to follow. Electronically signed by DUGLAS Ma on 8/9/2023 at 10:48 AM     Case Management Assessment  Initial Evaluation    Date/Time of Evaluation: 8/9/2023 10:48 AM  Assessment Completed by: DUGLAS Ma    If patient is discharged prior to next notation, then this note serves as note for discharge by case management. Patient Name: Jennifer Costa                   YOB: 1955  Diagnosis: Dizziness [R42]  Vertigo [R42]                   Date / Time: 8/7/2023  2:51 PM    Patient Admission Status: Inpatient   Readmission Risk (Low < 19, Mod (19-27), High > 27): Readmission Risk Score: 9.8    Current PCP: Linda Oneal, DO  PCP verified by CM? Yes    Chart Reviewed: Yes      History Provided by: Patient  Patient Orientation: Alert and Oriented, Person, Place, Situation    Patient Cognition: Alert    Hospitalization in the last 30 days (Readmission):  No    If yes, Readmission Assessment in  Navigator will be completed.     Advance Directives:      Code Status: Full Code   Patient's Primary Decision Maker is:      Primary Decision Maker: Alec Lowe - [phone]    Discharge Planning:    Patient lives with: Spouse/Significant Other Type of Home: House  Primary Care Giver: Self  Patient Support Systems include: Spouse/Significant Other   Current Financial resources:    Current community resources:    Current services prior to admission: None

## 2023-08-09 NOTE — CONSULTS
OTOLARYNGOLOGY  CONSULT NOTE  8/9/2023    Physician Consulted: Dr. Konstantin Ag  Reason for Consult: Dizziness, cerumen impaction  Referring Physician: Dr. Rajiv BAILON  Ivory Romo is a 79 y.o. female who ENT was consulted for evaluation of dizziness and cerumen impactions. Patient was admitted yesterday due to ongoing dizziness for the past few weeks. Describes the dizziness as \"movement when I close my eyes\" which is worse in the left eye. Symptoms are constant and not related to head movement. Denies room spinning when her eyes are open. She reports h/o VNG for dizziness many years ago but is not aware of the results. Denies any ear pain, changes in hearing, ear drainage or vision changes. H/o right retinal detachment with repair and T&A. She has been taking Meclizine with no relief. Review of Systems   Constitutional:  Negative for activity change, fatigue and fever. HENT:  Negative for congestion, ear discharge, ear pain, hearing loss, nosebleeds, postnasal drip, rhinorrhea, sinus pressure, sinus pain, sore throat, tinnitus, trouble swallowing and voice change. Respiratory:  Negative for cough, choking, wheezing and stridor. Cardiovascular:  Negative for chest pain. Gastrointestinal: Negative. Genitourinary: Negative. Skin:  Negative for color change and rash. Neurological:  Positive for dizziness. Negative for speech difficulty, light-headedness, numbness and headaches. Hematological:  Negative for adenopathy. Psychiatric/Behavioral:  Negative for behavioral problems.       Past Medical History:   Diagnosis Date    Abnormal Pap smear of cervix     Anxiety     Detached retina, right 08/17/2010,10/29/10    surgery, Dr. Sony Ram[facility]rez [facility]        Past Surgical History:   Procedure Laterality Date    CATARACT REMOVAL  07/17/11    also had scar tissue removed at this time from prev retinal surgeries    LEEP      TONSILLECTOMY AND ADENOIDECTOMY         Medications Prior to Admission:    Prior to
Surgical History:   Past Surgical History:   Procedure Laterality Date    CATARACT REMOVAL  07/17/11    also had scar tissue removed at this time from prev retinal surgeries    LEEP      TONSILLECTOMY AND ADENOIDECTOMY          Family History:   Family History   Problem Relation Age of Onset    High Cholesterol Mother     Cancer Maternal Uncle         colon    Diabetes Maternal Grandmother     High Blood Pressure Maternal Grandmother     Cancer Maternal Uncle         colon       Social History:  Social History     Tobacco Use    Smoking status: Never    Smokeless tobacco: Never   Vaping Use    Vaping Use: Never used   Substance Use Topics    Alcohol use: No    Drug use: No        Current Medications:      Current Facility-Administered Medications   Medication Dose Route Frequency Provider Last Rate Last Admin    sodium chloride flush 0.9 % injection 5-40 mL  5-40 mL IntraVENous 2 times per day Octavia Yuko Malmer, DO   10 mL at 08/09/23 3274    sodium chloride flush 0.9 % injection 5-40 mL  5-40 mL IntraVENous PRN Octavia Yuko Malmer, DO        0.9 % sodium chloride infusion   IntraVENous PRN Octavia Yuko Malmer, DO        enoxaparin (LOVENOX) injection 40 mg  40 mg SubCUTAneous Daily Octavia Yuko Malmer, DO   40 mg at 08/09/23 8438    ondansetron (ZOFRAN-ODT) disintegrating tablet 4 mg  4 mg Oral Q8H PRN Octavia Yuko Malmer, DO        Or    ondansetron (ZOFRAN) injection 4 mg  4 mg IntraVENous Q6H PRN Octavia Yuko Malmer, DO        polyethylene glycol (GLYCOLAX) packet 17 g  17 g Oral Daily PRN Octavia Yuko Malmer, DO        acetaminophen (TYLENOL) tablet 650 mg  650 mg Oral Q6H PRN Octavia Yuko Malmer, DO        Or    acetaminophen (TYLENOL) suppository 650 mg  650 mg Rectal Q6H PRN Octavia Yuko Malmer, DO        0.9 % sodium chloride infusion   IntraVENous Continuous Octavia Yuko Malmer, DO 75 mL/hr at 08/08/23 2235 New Bag at 08/08/23 2235    hydrOXYzine pamoate (VISTARIL) capsule 25 mg  25 mg Oral QHS PRN Octavia Yuko Malmer, DO   25 mg at

## 2023-08-09 NOTE — PLAN OF CARE
Problem: Discharge Planning  Goal: Discharge to home or other facility with appropriate resources  8/9/2023 0734 by Dany Joshua RN  Outcome: Progressing  8/8/2023 2311 by Allison Hermosillo RN  Outcome: Progressing     Problem: Safety - Adult  Goal: Free from fall injury  8/9/2023 0734 by Dany Joshua RN  Outcome: Progressing  8/8/2023 2311 by Allison Hermosillo RN  Outcome: Progressing

## 2023-08-10 ENCOUNTER — TELEPHONE (OUTPATIENT)
Dept: FAMILY MEDICINE CLINIC | Age: 68
End: 2023-08-10

## 2023-08-10 RX ORDER — ONDANSETRON 4 MG/1
4 TABLET, ORALLY DISINTEGRATING ORAL 3 TIMES DAILY PRN
Qty: 20 TABLET | Refills: 0 | Status: ON HOLD
Start: 2023-08-10 | End: 2023-08-21 | Stop reason: HOSPADM

## 2023-08-10 NOTE — TELEPHONE ENCOUNTER
Eunice De Guzman is having very bad nausea pt was just discharged from the ER and would like something sent in for nausea to [facility] on [address],[place] [address]

## 2023-08-14 ENCOUNTER — APPOINTMENT (OUTPATIENT)
Dept: CT IMAGING | Age: 68
End: 2023-08-14
Payer: MEDICARE

## 2023-08-14 ENCOUNTER — APPOINTMENT (OUTPATIENT)
Dept: ULTRASOUND IMAGING | Age: 68
End: 2023-08-14
Payer: MEDICARE

## 2023-08-14 ENCOUNTER — HOSPITAL ENCOUNTER (EMERGENCY)
Age: 68
Discharge: HOME | End: 2023-08-14
Attending: EMERGENCY MEDICINE
Payer: MEDICARE

## 2023-08-14 VITALS
TEMPERATURE: 98.1 F | HEART RATE: 78 BPM | SYSTOLIC BLOOD PRESSURE: 148 MMHG | OXYGEN SATURATION: 99 % | DIASTOLIC BLOOD PRESSURE: 68 MMHG | RESPIRATION RATE: 18 BRPM | BODY MASS INDEX: 21.03 KG/M2 | WEIGHT: 115 LBS

## 2023-08-14 DIAGNOSIS — N83.9 LESION OF OVARY: ICD-10-CM

## 2023-08-14 DIAGNOSIS — R10.12 LEFT UPPER QUADRANT ABDOMINAL PAIN: Primary | ICD-10-CM

## 2023-08-14 DIAGNOSIS — K57.90 DIVERTICULOSIS: ICD-10-CM

## 2023-08-14 DIAGNOSIS — N30.01 ACUTE CYSTITIS WITH HEMATURIA: ICD-10-CM

## 2023-08-14 LAB
ALBUMIN SERPL-MCNC: 4.6 G/DL (ref 3.5–5.2)
ALP SERPL-CCNC: 85 U/L (ref 35–104)
ALT SERPL-CCNC: 27 U/L (ref 0–32)
ANION GAP SERPL CALCULATED.3IONS-SCNC: 13 MMOL/L (ref 7–16)
AST SERPL-CCNC: 24 U/L (ref 0–31)
BACTERIA URNS QL MICRO: ABNORMAL
BASOPHILS # BLD: 0.03 K/UL (ref 0–0.2)
BASOPHILS NFR BLD: 0 % (ref 0–2)
BILIRUB SERPL-MCNC: 0.6 MG/DL (ref 0–1.2)
BILIRUB UR QL STRIP: ABNORMAL
BUN SERPL-MCNC: 24 MG/DL (ref 6–23)
CALCIUM SERPL-MCNC: 9.6 MG/DL (ref 8.6–10.2)
CHLORIDE SERPL-SCNC: 104 MMOL/L (ref 98–107)
CLARITY UR: CLEAR
CO2 SERPL-SCNC: 23 MMOL/L (ref 22–29)
COLOR UR: YELLOW
CREAT SERPL-MCNC: 0.8 MG/DL (ref 0.5–1)
EKG ATRIAL RATE: 82 BPM
EKG P AXIS: 77 DEGREES
EKG P-R INTERVAL: 120 MS
EKG Q-T INTERVAL: 376 MS
EKG QRS DURATION: 82 MS
EKG QTC CALCULATION (BAZETT): 439 MS
EKG R AXIS: 79 DEGREES
EKG T AXIS: 54 DEGREES
EKG VENTRICULAR RATE: 82 BPM
EOSINOPHIL # BLD: 0.02 K/UL (ref 0.05–0.5)
EOSINOPHILS RELATIVE PERCENT: 0 % (ref 0–6)
ERYTHROCYTE [DISTWIDTH] IN BLOOD BY AUTOMATED COUNT: 13 % (ref 11.5–15)
GFR SERPL CREATININE-BSD FRML MDRD: >60 ML/MIN/1.73M2
GLUCOSE SERPL-MCNC: 127 MG/DL (ref 74–99)
GLUCOSE UR STRIP-MCNC: NEGATIVE MG/DL
HCT VFR BLD AUTO: 43.8 % (ref 34–48)
HGB BLD-MCNC: 14.9 G/DL (ref 11.5–15.5)
HGB UR QL STRIP.AUTO: ABNORMAL
IMM GRANULOCYTES # BLD AUTO: <0.03 K/UL (ref 0–0.58)
IMM GRANULOCYTES NFR BLD: 0 % (ref 0–5)
KETONES UR STRIP-MCNC: >80 MG/DL
LACTATE BLDV-SCNC: 1.2 MMOL/L (ref 0.5–2.2)
LEUKOCYTE ESTERASE UR QL STRIP: NEGATIVE
LIPASE SERPL-CCNC: 23 U/L (ref 13–60)
LYMPHOCYTES NFR BLD: 1.27 K/UL (ref 1.5–4)
LYMPHOCYTES RELATIVE PERCENT: 16 % (ref 20–42)
MCH RBC QN AUTO: 30.3 PG (ref 26–35)
MCHC RBC AUTO-ENTMCNC: 34 G/DL (ref 32–34.5)
MCV RBC AUTO: 89.2 FL (ref 80–99.9)
MONOCYTES NFR BLD: 0.58 K/UL (ref 0.1–0.95)
MONOCYTES NFR BLD: 7 % (ref 2–12)
NEUTROPHILS NFR BLD: 77 % (ref 43–80)
NEUTS SEG NFR BLD: 6.3 K/UL (ref 1.8–7.3)
NITRITE UR QL STRIP: NEGATIVE
PH UR STRIP: 5 [PH] (ref 5–9)
PLATELET # BLD AUTO: 272 K/UL (ref 130–450)
PMV BLD AUTO: 10.1 FL (ref 7–12)
POTASSIUM SERPL-SCNC: 4.3 MMOL/L (ref 3.5–5)
PROT SERPL-MCNC: 7.2 G/DL (ref 6.4–8.3)
PROT UR STRIP-MCNC: ABNORMAL MG/DL
RBC # BLD AUTO: 4.91 M/UL (ref 3.5–5.5)
RBC #/AREA URNS HPF: ABNORMAL /HPF
REASON FOR REJECTION: NORMAL
SODIUM SERPL-SCNC: 140 MMOL/L (ref 132–146)
SP GR UR STRIP: >1.03 (ref 1–1.03)
SPECIMEN SOURCE: NORMAL
TROPONIN I SERPL HS-MCNC: 8 NG/L (ref 0–9)
UROBILINOGEN UR STRIP-ACNC: 0.2 EU/DL (ref 0–1)
WBC #/AREA URNS HPF: ABNORMAL /HPF
WBC OTHER # BLD: 8.2 K/UL (ref 4.5–11.5)
ZZ NTE CLEAN UP: ORDERED TEST: NORMAL

## 2023-08-14 PROCEDURE — 2580000003 HC RX 258: Performed by: EMERGENCY MEDICINE

## 2023-08-14 PROCEDURE — 81001 URINALYSIS AUTO W/SCOPE: CPT

## 2023-08-14 PROCEDURE — 96374 THER/PROPH/DIAG INJ IV PUSH: CPT

## 2023-08-14 PROCEDURE — 99285 EMERGENCY DEPT VISIT HI MDM: CPT

## 2023-08-14 PROCEDURE — 2500000003 HC RX 250 WO HCPCS: Performed by: EMERGENCY MEDICINE

## 2023-08-14 PROCEDURE — 93010 ELECTROCARDIOGRAM REPORT: CPT | Performed by: INTERNAL MEDICINE

## 2023-08-14 PROCEDURE — 85025 COMPLETE CBC W/AUTO DIFF WBC: CPT

## 2023-08-14 PROCEDURE — 6370000000 HC RX 637 (ALT 250 FOR IP): Performed by: EMERGENCY MEDICINE

## 2023-08-14 PROCEDURE — 80053 COMPREHEN METABOLIC PANEL: CPT

## 2023-08-14 PROCEDURE — 6360000004 HC RX CONTRAST MEDICATION: Performed by: RADIOLOGY

## 2023-08-14 PROCEDURE — 83690 ASSAY OF LIPASE: CPT

## 2023-08-14 PROCEDURE — 93005 ELECTROCARDIOGRAM TRACING: CPT | Performed by: EMERGENCY MEDICINE

## 2023-08-14 PROCEDURE — A4216 STERILE WATER/SALINE, 10 ML: HCPCS | Performed by: EMERGENCY MEDICINE

## 2023-08-14 PROCEDURE — 76856 US EXAM PELVIC COMPLETE: CPT

## 2023-08-14 PROCEDURE — 74177 CT ABD & PELVIS W/CONTRAST: CPT

## 2023-08-14 PROCEDURE — 83605 ASSAY OF LACTIC ACID: CPT

## 2023-08-14 PROCEDURE — 84484 ASSAY OF TROPONIN QUANT: CPT

## 2023-08-14 RX ORDER — CEFDINIR 300 MG/1
300 CAPSULE ORAL 2 TIMES DAILY
Qty: 14 CAPSULE | Refills: 0 | Status: ON HOLD | OUTPATIENT
Start: 2023-08-14 | End: 2023-08-17 | Stop reason: HOSPADM

## 2023-08-14 RX ORDER — DICYCLOMINE HYDROCHLORIDE 10 MG/1
10 CAPSULE ORAL 3 TIMES DAILY PRN
Qty: 20 CAPSULE | Refills: 0 | Status: ON HOLD | OUTPATIENT
Start: 2023-08-14

## 2023-08-14 RX ORDER — OMEPRAZOLE 20 MG/1
20 CAPSULE, DELAYED RELEASE ORAL
Qty: 30 CAPSULE | Refills: 0 | Status: ON HOLD | OUTPATIENT
Start: 2023-08-14 | End: 2023-08-17 | Stop reason: SDUPTHER

## 2023-08-14 RX ADMIN — IOPAMIDOL 75 ML: 755 INJECTION, SOLUTION INTRAVENOUS at 08:46

## 2023-08-14 RX ADMIN — FAMOTIDINE 20 MG: 10 INJECTION INTRAVENOUS at 07:12

## 2023-08-14 RX ADMIN — Medication: at 07:12

## 2023-08-14 ASSESSMENT — PAIN DESCRIPTION - DESCRIPTORS: DESCRIPTORS: BURNING

## 2023-08-14 ASSESSMENT — PAIN DESCRIPTION - ORIENTATION: ORIENTATION: LEFT;UPPER

## 2023-08-14 ASSESSMENT — ENCOUNTER SYMPTOMS
SHORTNESS OF BREATH: 0
COLOR CHANGE: 0
ABDOMINAL PAIN: 1
BACK PAIN: 0
COUGH: 0
RHINORRHEA: 0
PHOTOPHOBIA: 0

## 2023-08-14 ASSESSMENT — LIFESTYLE VARIABLES
HOW OFTEN DO YOU HAVE A DRINK CONTAINING ALCOHOL: NEVER
HOW MANY STANDARD DRINKS CONTAINING ALCOHOL DO YOU HAVE ON A TYPICAL DAY: PATIENT DOES NOT DRINK

## 2023-08-14 ASSESSMENT — PAIN - FUNCTIONAL ASSESSMENT
PAIN_FUNCTIONAL_ASSESSMENT: INTOLERABLE, UNABLE TO DO ANY ACTIVE OR PASSIVE ACTIVITIES
PAIN_FUNCTIONAL_ASSESSMENT: 0-10

## 2023-08-14 ASSESSMENT — PAIN DESCRIPTION - PAIN TYPE: TYPE: ACUTE PAIN

## 2023-08-14 ASSESSMENT — PAIN DESCRIPTION - ONSET: ONSET: ON-GOING

## 2023-08-14 ASSESSMENT — PAIN DESCRIPTION - LOCATION: LOCATION: ABDOMEN

## 2023-08-14 ASSESSMENT — PAIN SCALES - GENERAL: PAINLEVEL_OUTOF10: 10

## 2023-08-14 ASSESSMENT — PAIN DESCRIPTION - FREQUENCY: FREQUENCY: CONTINUOUS

## 2023-08-14 NOTE — DISCHARGE INSTRUCTIONS
Call family doctor tomorrow and schedule a followup appointment to be seen in 2 days    Have your doctor obtain  finalized report of ALL TEST RESULTS    US PELVIS COMPLETE   Final Result   Suboptimal pelvic ultrasound due to bowel gas. No definite adnexal lesion   visualized. CT ABDOMEN PELVIS W IV CONTRAST Additional Contrast? None   Final Result   1. No acute inflammatory process in the abdomen or pelvis. 2.  Sigmoid diverticulosis without evidence of diverticulitis. 3.  1.5 cm left ovarian simple appearing cyst, no imaging follow-up   recommended.

## 2023-08-15 ENCOUNTER — OFFICE VISIT (OUTPATIENT)
Dept: FAMILY MEDICINE CLINIC | Age: 68
End: 2023-08-15

## 2023-08-15 VITALS
BODY MASS INDEX: 20.24 KG/M2 | OXYGEN SATURATION: 96 % | HEIGHT: 62 IN | HEART RATE: 90 BPM | WEIGHT: 110 LBS | RESPIRATION RATE: 16 BRPM | SYSTOLIC BLOOD PRESSURE: 112 MMHG | DIASTOLIC BLOOD PRESSURE: 64 MMHG | TEMPERATURE: 96 F

## 2023-08-15 DIAGNOSIS — R30.0 BURNING WITH URINATION: ICD-10-CM

## 2023-08-15 DIAGNOSIS — Z86.69 HISTORY OF RETINAL DETACHMENT: ICD-10-CM

## 2023-08-15 DIAGNOSIS — H57.12 LEFT EYE PAIN: ICD-10-CM

## 2023-08-15 DIAGNOSIS — Z09 HOSPITAL DISCHARGE FOLLOW-UP: Primary | ICD-10-CM

## 2023-08-15 DIAGNOSIS — F41.9 ANXIETY: ICD-10-CM

## 2023-08-15 DIAGNOSIS — R42 DIZZINESS: ICD-10-CM

## 2023-08-15 LAB
BILIRUBIN, POC: NORMAL
BLOOD URINE, POC: NORMAL
CLARITY, POC: NORMAL
COLOR, POC: YELLOW
GLUCOSE URINE, POC: NORMAL
KETONES, POC: 80
LEUKOCYTE EST, POC: NORMAL
NITRITE, POC: NORMAL
PH, POC: 5
PROTEIN, POC: 100
SPECIFIC GRAVITY, POC: >=1.03
UROBILINOGEN, POC: 0.2

## 2023-08-15 RX ORDER — FLUOXETINE 10 MG/1
10 CAPSULE ORAL DAILY
Qty: 30 CAPSULE | Refills: 3 | Status: ON HOLD
Start: 2023-08-15 | End: 2023-08-21 | Stop reason: HOSPADM

## 2023-08-15 SDOH — ECONOMIC STABILITY: FOOD INSECURITY: WITHIN THE PAST 12 MONTHS, THE FOOD YOU BOUGHT JUST DIDN'T LAST AND YOU DIDN'T HAVE MONEY TO GET MORE.: NEVER TRUE

## 2023-08-15 SDOH — ECONOMIC STABILITY: HOUSING INSECURITY
IN THE LAST 12 MONTHS, WAS THERE A TIME WHEN YOU DID NOT HAVE A STEADY PLACE TO SLEEP OR SLEPT IN A SHELTER (INCLUDING NOW)?: NO

## 2023-08-15 SDOH — ECONOMIC STABILITY: FOOD INSECURITY: WITHIN THE PAST 12 MONTHS, YOU WORRIED THAT YOUR FOOD WOULD RUN OUT BEFORE YOU GOT MONEY TO BUY MORE.: NEVER TRUE

## 2023-08-15 SDOH — ECONOMIC STABILITY: INCOME INSECURITY: HOW HARD IS IT FOR YOU TO PAY FOR THE VERY BASICS LIKE FOOD, HOUSING, MEDICAL CARE, AND HEATING?: NOT HARD AT ALL

## 2023-08-15 ASSESSMENT — PATIENT HEALTH QUESTIONNAIRE - PHQ9
SUM OF ALL RESPONSES TO PHQ QUESTIONS 1-9: 6
10. IF YOU CHECKED OFF ANY PROBLEMS, HOW DIFFICULT HAVE THESE PROBLEMS MADE IT FOR YOU TO DO YOUR WORK, TAKE CARE OF THINGS AT HOME, OR GET ALONG WITH OTHER PEOPLE: 3
2. FEELING DOWN, DEPRESSED OR HOPELESS: 0
7. TROUBLE CONCENTRATING ON THINGS, SUCH AS READING THE NEWSPAPER OR WATCHING TELEVISION: 1
5. POOR APPETITE OR OVEREATING: 1
1. LITTLE INTEREST OR PLEASURE IN DOING THINGS: 1
SUM OF ALL RESPONSES TO PHQ9 QUESTIONS 1 & 2: 1
3. TROUBLE FALLING OR STAYING ASLEEP: 1
SUM OF ALL RESPONSES TO PHQ QUESTIONS 1-9: 6
6. FEELING BAD ABOUT YOURSELF - OR THAT YOU ARE A FAILURE OR HAVE LET YOURSELF OR YOUR FAMILY DOWN: 0
SUM OF ALL RESPONSES TO PHQ QUESTIONS 1-9: 6
9. THOUGHTS THAT YOU WOULD BE BETTER OFF DEAD, OR OF HURTING YOURSELF: 0
SUM OF ALL RESPONSES TO PHQ QUESTIONS 1-9: 6
8. MOVING OR SPEAKING SO SLOWLY THAT OTHER PEOPLE COULD HAVE NOTICED. OR THE OPPOSITE, BEING SO FIGETY OR RESTLESS THAT YOU HAVE BEEN MOVING AROUND A LOT MORE THAN USUAL: 1
4. FEELING TIRED OR HAVING LITTLE ENERGY: 1

## 2023-08-15 NOTE — PROGRESS NOTES
[facility] received referral to meet with pt re: counseling for her anxiety. [facility] met wit pt and , along with sister present. Discussed role and scope of practice. Pt would like to be seen for assessment. Appointment made for 8/24.  FEDE Bueno, LISANDRA-S

## 2023-08-15 NOTE — PROGRESS NOTES
Post-Discharge Transitional Care Follow Up      Beronica Morgan   YOB: 1955    Date of Office Visit:  8/15/2023  Date of Hospital Admission: 8/18/23  Date of Hospital Discharge: 8/21/23  Readmission Risk Score (high >=14%. Medium >=10%):Readmission Risk Score: 17.4      Care management risk score Rising risk (score 2-5) and Complex Care (Scores >=6): No Risk Score On File     Non face to face  following discharge, date last encounter closed (first attempt may have been earlier): 08/23/2023     Call initiated 2 business days of discharge: Yes     Hospital discharge follow-up  -     [address] MED LIST  Anxiety  Left eye pain  -     Mukesh Lema MD, Ophthalmology, [place]  History of retinal detachment  -     Mukesh Lema MD, Ophthalmology, Samantha Aldana MD, Ophthalmology, [place]  -     POCT Urinalysis no Micro  -     Culture, Urine  Burning with urination  -     Culture, Urine    Medical Decision Making: high complexity  Return in 1 month (on 9/15/2023). Subjective:   HPI    Inpatient course: Discharge summary reviewed- see chart. Interval history/Current status: poor    Patient Active Problem List   Diagnosis    MDD (major depressive disorder)    Retinal detachment with retinal defect    Vertigo    Anxiety    Portal vein thrombosis    Intractable abdominal pain    Unable to ambulate    Left upper quadrant abdominal pain       Medications listed as ordered at the time of discharge from hospital     Medication List            Accurate as of August 15, 2023 11:59 PM. If you have any questions, ask your nurse or doctor.                 START taking these medications      FLUoxetine 10 MG capsule  Commonly known as: PROZAC  Take 1 capsule by mouth daily  Started by: Sanam Dumont, DO            CONTINUE taking these medications      cefdinir 300 MG capsule  Commonly known as: OMNICEF  Take 1

## 2023-08-16 ENCOUNTER — HOSPITAL ENCOUNTER (EMERGENCY)
Age: 68
Discharge: ANOTHER HOSPITAL | DRG: 441 | End: 2023-08-16
Attending: STUDENT IN AN ORGANIZED HEALTH CARE EDUCATION/TRAINING PROGRAM
Payer: MEDICARE

## 2023-08-16 ENCOUNTER — TELEPHONE (OUTPATIENT)
Dept: FAMILY MEDICINE CLINIC | Age: 68
End: 2023-08-16

## 2023-08-16 ENCOUNTER — APPOINTMENT (OUTPATIENT)
Dept: CT IMAGING | Age: 68
DRG: 441 | End: 2023-08-16
Payer: MEDICARE

## 2023-08-16 ENCOUNTER — HOSPITAL ENCOUNTER (INPATIENT)
Age: 68
LOS: 1 days | Discharge: HOME | End: 2023-08-17
Attending: FAMILY MEDICINE | Admitting: FAMILY MEDICINE
Payer: MEDICARE

## 2023-08-16 VITALS
TEMPERATURE: 98.8 F | RESPIRATION RATE: 16 BRPM | OXYGEN SATURATION: 98 % | DIASTOLIC BLOOD PRESSURE: 60 MMHG | HEART RATE: 79 BPM | SYSTOLIC BLOOD PRESSURE: 109 MMHG

## 2023-08-16 DIAGNOSIS — R10.9 INTRACTABLE ABDOMINAL PAIN: Primary | ICD-10-CM

## 2023-08-16 DIAGNOSIS — R82.4 KETONURIA: ICD-10-CM

## 2023-08-16 LAB
ALBUMIN SERPL-MCNC: 4.6 G/DL (ref 3.5–5.2)
ALP SERPL-CCNC: 85 U/L (ref 35–104)
ALT SERPL-CCNC: 22 U/L (ref 0–32)
ANION GAP SERPL CALCULATED.3IONS-SCNC: 11 MMOL/L (ref 7–16)
AST SERPL-CCNC: 20 U/L (ref 0–31)
BASOPHILS # BLD: 0.02 K/UL (ref 0–0.2)
BASOPHILS NFR BLD: 0 % (ref 0–2)
BILIRUB SERPL-MCNC: 0.6 MG/DL (ref 0–1.2)
BILIRUB UR QL STRIP: NEGATIVE
BUN SERPL-MCNC: 24 MG/DL (ref 6–23)
CALCIUM SERPL-MCNC: 9.7 MG/DL (ref 8.6–10.2)
CHLORIDE SERPL-SCNC: 103 MMOL/L (ref 98–107)
CLARITY UR: CLEAR
CO2 SERPL-SCNC: 24 MMOL/L (ref 22–29)
COLOR UR: YELLOW
CREAT SERPL-MCNC: 0.9 MG/DL (ref 0.5–1)
EKG ATRIAL RATE: 66 BPM
EKG P AXIS: 79 DEGREES
EKG P-R INTERVAL: 102 MS
EKG Q-T INTERVAL: 398 MS
EKG QRS DURATION: 76 MS
EKG QTC CALCULATION (BAZETT): 417 MS
EKG R AXIS: 82 DEGREES
EKG T AXIS: 65 DEGREES
EKG VENTRICULAR RATE: 66 BPM
EOSINOPHIL # BLD: 0.02 K/UL (ref 0.05–0.5)
EOSINOPHILS RELATIVE PERCENT: 0 % (ref 0–6)
ERYTHROCYTE [DISTWIDTH] IN BLOOD BY AUTOMATED COUNT: 12.8 % (ref 11.5–15)
GFR SERPL CREATININE-BSD FRML MDRD: >60 ML/MIN/1.73M2
GLUCOSE SERPL-MCNC: 108 MG/DL (ref 74–99)
GLUCOSE UR STRIP-MCNC: NEGATIVE MG/DL
HCT VFR BLD AUTO: 42.7 % (ref 34–48)
HGB BLD-MCNC: 15 G/DL (ref 11.5–15.5)
HGB UR QL STRIP.AUTO: ABNORMAL
IMM GRANULOCYTES # BLD AUTO: <0.03 K/UL (ref 0–0.58)
IMM GRANULOCYTES NFR BLD: 0 % (ref 0–5)
KETONES UR STRIP-MCNC: >80 MG/DL
LACTATE BLDV-SCNC: 1.4 MMOL/L (ref 0.5–2.2)
LEUKOCYTE ESTERASE UR QL STRIP: NEGATIVE
LIPASE SERPL-CCNC: 29 U/L (ref 13–60)
LYMPHOCYTES NFR BLD: 0.77 K/UL (ref 1.5–4)
LYMPHOCYTES RELATIVE PERCENT: 9 % (ref 20–42)
MCH RBC QN AUTO: 31.1 PG (ref 26–35)
MCHC RBC AUTO-ENTMCNC: 35.1 G/DL (ref 32–34.5)
MCV RBC AUTO: 88.4 FL (ref 80–99.9)
MONOCYTES NFR BLD: 0.58 K/UL (ref 0.1–0.95)
MONOCYTES NFR BLD: 7 % (ref 2–12)
NEUTROPHILS NFR BLD: 83 % (ref 43–80)
NEUTS SEG NFR BLD: 6.76 K/UL (ref 1.8–7.3)
NITRITE UR QL STRIP: NEGATIVE
PH UR STRIP: 5.5 [PH] (ref 5–9)
PLATELET # BLD AUTO: 297 K/UL (ref 130–450)
PMV BLD AUTO: 10.3 FL (ref 7–12)
POTASSIUM SERPL-SCNC: 4.3 MMOL/L (ref 3.5–5)
PROT SERPL-MCNC: 7.1 G/DL (ref 6.4–8.3)
PROT UR STRIP-MCNC: NEGATIVE MG/DL
RBC # BLD AUTO: 4.83 M/UL (ref 3.5–5.5)
RBC #/AREA URNS HPF: NORMAL /HPF
SODIUM SERPL-SCNC: 138 MMOL/L (ref 132–146)
SP GR UR STRIP: >1.03 (ref 1–1.03)
TROPONIN I SERPL HS-MCNC: 7 NG/L (ref 0–9)
UROBILINOGEN UR STRIP-ACNC: 0.2 EU/DL (ref 0–1)
WBC #/AREA URNS HPF: NORMAL /HPF
WBC OTHER # BLD: 8.2 K/UL (ref 4.5–11.5)

## 2023-08-16 PROCEDURE — 83690 ASSAY OF LIPASE: CPT

## 2023-08-16 PROCEDURE — 84484 ASSAY OF TROPONIN QUANT: CPT

## 2023-08-16 PROCEDURE — 93005 ELECTROCARDIOGRAM TRACING: CPT | Performed by: STUDENT IN AN ORGANIZED HEALTH CARE EDUCATION/TRAINING PROGRAM

## 2023-08-16 PROCEDURE — 6360000004 HC RX CONTRAST MEDICATION: Performed by: RADIOLOGY

## 2023-08-16 PROCEDURE — 93010 ELECTROCARDIOGRAM REPORT: CPT | Performed by: INTERNAL MEDICINE

## 2023-08-16 PROCEDURE — C9113 INJ PANTOPRAZOLE SODIUM, VIA: HCPCS | Performed by: STUDENT IN AN ORGANIZED HEALTH CARE EDUCATION/TRAINING PROGRAM

## 2023-08-16 PROCEDURE — 80053 COMPREHEN METABOLIC PANEL: CPT

## 2023-08-16 PROCEDURE — 1200000000 HC SEMI PRIVATE

## 2023-08-16 PROCEDURE — 83605 ASSAY OF LACTIC ACID: CPT

## 2023-08-16 PROCEDURE — 6370000000 HC RX 637 (ALT 250 FOR IP): Performed by: STUDENT IN AN ORGANIZED HEALTH CARE EDUCATION/TRAINING PROGRAM

## 2023-08-16 PROCEDURE — 85025 COMPLETE CBC W/AUTO DIFF WBC: CPT

## 2023-08-16 PROCEDURE — 6360000002 HC RX W HCPCS: Performed by: STUDENT IN AN ORGANIZED HEALTH CARE EDUCATION/TRAINING PROGRAM

## 2023-08-16 PROCEDURE — 74177 CT ABD & PELVIS W/CONTRAST: CPT

## 2023-08-16 PROCEDURE — 81001 URINALYSIS AUTO W/SCOPE: CPT

## 2023-08-16 PROCEDURE — 2580000003 HC RX 258: Performed by: STUDENT IN AN ORGANIZED HEALTH CARE EDUCATION/TRAINING PROGRAM

## 2023-08-16 RX ORDER — METOCLOPRAMIDE HYDROCHLORIDE 5 MG/ML
10 INJECTION INTRAMUSCULAR; INTRAVENOUS ONCE
Status: COMPLETED | OUTPATIENT
Start: 2023-08-16 | End: 2023-08-16

## 2023-08-16 RX ORDER — 0.9 % SODIUM CHLORIDE 0.9 %
1000 INTRAVENOUS SOLUTION INTRAVENOUS ONCE
Status: COMPLETED | OUTPATIENT
Start: 2023-08-16 | End: 2023-08-16

## 2023-08-16 RX ORDER — DIPHENHYDRAMINE HYDROCHLORIDE 50 MG/ML
25 INJECTION INTRAMUSCULAR; INTRAVENOUS ONCE
Status: COMPLETED | OUTPATIENT
Start: 2023-08-16 | End: 2023-08-16

## 2023-08-16 RX ORDER — PANTOPRAZOLE SODIUM 40 MG/10ML
40 INJECTION, POWDER, LYOPHILIZED, FOR SOLUTION INTRAVENOUS ONCE
Status: COMPLETED | OUTPATIENT
Start: 2023-08-16 | End: 2023-08-16

## 2023-08-16 RX ORDER — FAMOTIDINE 20 MG/1
20 TABLET, FILM COATED ORAL ONCE
Status: COMPLETED | OUTPATIENT
Start: 2023-08-16 | End: 2023-08-16

## 2023-08-16 RX ORDER — MORPHINE SULFATE 2 MG/ML
2 INJECTION, SOLUTION INTRAMUSCULAR; INTRAVENOUS ONCE
Status: DISCONTINUED | OUTPATIENT
Start: 2023-08-16 | End: 2023-08-16 | Stop reason: HOSPADM

## 2023-08-16 RX ORDER — HYDROXYZINE PAMOATE 25 MG/1
25 CAPSULE ORAL ONCE
Status: COMPLETED | OUTPATIENT
Start: 2023-08-16 | End: 2023-08-16

## 2023-08-16 RX ORDER — DICYCLOMINE HYDROCHLORIDE 10 MG/1
10 CAPSULE ORAL ONCE
Status: COMPLETED | OUTPATIENT
Start: 2023-08-16 | End: 2023-08-16

## 2023-08-16 RX ADMIN — HYDROXYZINE PAMOATE 25 MG: 25 CAPSULE ORAL at 12:29

## 2023-08-16 RX ADMIN — IOPAMIDOL 75 ML: 755 INJECTION, SOLUTION INTRAVENOUS at 15:02

## 2023-08-16 RX ADMIN — Medication: at 13:42

## 2023-08-16 RX ADMIN — DIPHENHYDRAMINE HYDROCHLORIDE 25 MG: 50 INJECTION INTRAMUSCULAR; INTRAVENOUS at 18:20

## 2023-08-16 RX ADMIN — METOCLOPRAMIDE HYDROCHLORIDE 10 MG: 5 INJECTION INTRAMUSCULAR; INTRAVENOUS at 18:20

## 2023-08-16 RX ADMIN — FAMOTIDINE 20 MG: 20 TABLET, FILM COATED ORAL at 11:41

## 2023-08-16 RX ADMIN — PANTOPRAZOLE SODIUM 40 MG: 40 INJECTION, POWDER, FOR SOLUTION INTRAVENOUS at 17:04

## 2023-08-16 RX ADMIN — DICYCLOMINE HYDROCHLORIDE 10 MG: 10 CAPSULE ORAL at 13:42

## 2023-08-16 RX ADMIN — SODIUM CHLORIDE 1000 ML: 9 INJECTION, SOLUTION INTRAVENOUS at 11:53

## 2023-08-16 ASSESSMENT — ENCOUNTER SYMPTOMS
DIARRHEA: 0
EYE REDNESS: 0
EYE DISCHARGE: 0
ABDOMINAL DISTENTION: 0
ABDOMINAL PAIN: 1
SINUS PRESSURE: 0
WHEEZING: 0
BACK PAIN: 0
EYE PAIN: 0
COUGH: 0
VOMITING: 0
NAUSEA: 1
SORE THROAT: 0
SHORTNESS OF BREATH: 0

## 2023-08-16 ASSESSMENT — PAIN - FUNCTIONAL ASSESSMENT: PAIN_FUNCTIONAL_ASSESSMENT: NONE - DENIES PAIN

## 2023-08-16 NOTE — TELEPHONE ENCOUNTER
Pt's  called in stating that she is still having pain in her stomach. States she took her Prilosec, but states its not working. Wanted her to be seen by Dr Jade Kimble again. I told him I can send a message back to  because she is only here until around 10 [place]. Please Advise.

## 2023-08-16 NOTE — ED PROVIDER NOTES
Department of Emergency Medicine   ED  Provider Note  Admit Date/RoomTime: 8/16/2023 10:30 AM  ED Room: 08/08              [facility]     Stanton Momin is a 79 y.o. female with a PMHx significant for  vertigo  who presents for evaluation of abdominal burning, nausea and vomiting, beginning prior to arrival.  The complaint has been persistent, moderate in severity, and worsened by nothing. The patient states that she was just discharged from [facility] for vertigo. Notes that since she was discharged home she has been taking medication for her vertigo. When she takes this she fees that it is causing her to feel nauseated and having burning on her stomach. Denies any history of stomach ulcers, black dark / bloody stools, ibuprofen / NSAID use. Review of Systems   Constitutional:  Negative for chills and fever. HENT:  Negative for ear pain, sinus pressure and sore throat. Eyes:  Negative for pain, discharge and redness. Respiratory:  Negative for cough, shortness of breath and wheezing. Cardiovascular:  Negative for chest pain. Gastrointestinal:  Positive for abdominal pain and nausea. Negative for abdominal distention, diarrhea and vomiting. Genitourinary:  Negative for dysuria and frequency. Musculoskeletal:  Negative for arthralgias and back pain. Skin:  Negative for rash and wound. Neurological:  Negative for weakness and headaches. Hematological:  Negative for adenopathy. All other systems reviewed and are negative. Physical Exam  Vitals and nursing note reviewed. Constitutional:       General: She is not in acute distress. Appearance: She is well-developed. She is not ill-appearing. HENT:      Head: Normocephalic and atraumatic. Right Ear: External ear normal.      Left Ear: External ear normal.   Eyes:      General:         Right eye: No discharge. Left eye: No discharge. Extraocular Movements: Extraocular movements intact.       Conjunctiva/sclera: Conjunctivae admission.    --------------------------------- ADDITIONAL PROVIDER NOTES ---------------------------------  Consultations:  Spoke with Dr. Kelly Silva. Discussed case. They will admit the patient. This patient's ED course included: a personal history and physicial examination, re-evaluation prior to disposition, multiple bedside re-evaluations, IV medications, cardiac monitoring, continuous pulse oximetry, and complex medical decision making and emergency management    This patient has remained hemodynamically stable during their ED course. Diagnosis:  1. Intractable abdominal pain    2. Ketonuria        Disposition:  Patient's disposition: transfer to [address] [place] [address] med surg  Patient's condition is good. Please note that the above documentation was prepared using voice recognition software. Every attempt was made to ensure accuracy but there may be spelling, grammatical, and contextual errors.        [facility] GarberSt[facility]  08/21/23 7013

## 2023-08-17 VITALS
HEIGHT: 62 IN | SYSTOLIC BLOOD PRESSURE: 107 MMHG | BODY MASS INDEX: 20.61 KG/M2 | HEART RATE: 71 BPM | OXYGEN SATURATION: 97 % | DIASTOLIC BLOOD PRESSURE: 59 MMHG | WEIGHT: 112 LBS | TEMPERATURE: 98 F | RESPIRATION RATE: 16 BRPM

## 2023-08-17 PROBLEM — R11.0 NAUSEA: Status: ACTIVE | Noted: 2023-08-17

## 2023-08-17 PROBLEM — E86.0 DEHYDRATION: Status: RESOLVED | Noted: 2023-08-17 | Resolved: 2023-08-17

## 2023-08-17 PROBLEM — F41.9 ANXIETY: Status: ACTIVE | Noted: 2023-08-17

## 2023-08-17 PROBLEM — R10.84 GENERALIZED ABDOMINAL PAIN: Status: ACTIVE | Noted: 2023-08-17

## 2023-08-17 PROBLEM — E86.0 DEHYDRATION: Status: ACTIVE | Noted: 2023-08-17

## 2023-08-17 PROBLEM — R10.84 GENERALIZED ABDOMINAL PAIN: Status: RESOLVED | Noted: 2023-08-17 | Resolved: 2023-08-17

## 2023-08-17 PROBLEM — R11.0 NAUSEA: Status: RESOLVED | Noted: 2023-08-17 | Resolved: 2023-08-17

## 2023-08-17 LAB
ALBUMIN SERPL-MCNC: 4.2 G/DL (ref 3.5–5.2)
ALP SERPL-CCNC: 76 U/L (ref 35–104)
ALT SERPL-CCNC: 23 U/L (ref 0–32)
ANION GAP SERPL CALCULATED.3IONS-SCNC: 12 MMOL/L (ref 7–16)
AST SERPL-CCNC: 22 U/L (ref 0–31)
BASOPHILS # BLD: 0.08 K/UL (ref 0–0.2)
BASOPHILS NFR BLD: 1 % (ref 0–2)
BILIRUB SERPL-MCNC: 0.4 MG/DL (ref 0–1.2)
BUN SERPL-MCNC: 12 MG/DL (ref 6–23)
CALCIUM SERPL-MCNC: 9.1 MG/DL (ref 8.6–10.2)
CHLORIDE SERPL-SCNC: 106 MMOL/L (ref 98–107)
CO2 SERPL-SCNC: 24 MMOL/L (ref 22–29)
CREAT SERPL-MCNC: 0.8 MG/DL (ref 0.5–1)
CULTURE: NORMAL
EOSINOPHIL # BLD: 0.09 K/UL (ref 0.05–0.5)
EOSINOPHILS RELATIVE PERCENT: 1 % (ref 0–6)
ERYTHROCYTE [DISTWIDTH] IN BLOOD BY AUTOMATED COUNT: 12.9 % (ref 11.5–15)
FOLATE SERPL-MCNC: 15.9 NG/ML (ref 4.8–24.2)
GFR SERPL CREATININE-BSD FRML MDRD: >60 ML/MIN/1.73M2
GLUCOSE SERPL-MCNC: 81 MG/DL (ref 74–99)
HBA1C MFR BLD: 5.1 % (ref 4–5.6)
HCT VFR BLD AUTO: 39 % (ref 34–48)
HGB BLD-MCNC: 13 G/DL (ref 11.5–15.5)
IMM GRANULOCYTES # BLD AUTO: <0.03 K/UL (ref 0–0.58)
IMM GRANULOCYTES NFR BLD: 0 % (ref 0–5)
LYMPHOCYTES NFR BLD: 1.13 K/UL (ref 1.5–4)
LYMPHOCYTES RELATIVE PERCENT: 16 % (ref 20–42)
MCH RBC QN AUTO: 30 PG (ref 26–35)
MCHC RBC AUTO-ENTMCNC: 33.3 G/DL (ref 32–34.5)
MCV RBC AUTO: 90.1 FL (ref 80–99.9)
MONOCYTES NFR BLD: 0.69 K/UL (ref 0.1–0.95)
MONOCYTES NFR BLD: 10 % (ref 2–12)
NEUTROPHILS NFR BLD: 71 % (ref 43–80)
NEUTS SEG NFR BLD: 4.93 K/UL (ref 1.8–7.3)
PLATELET # BLD AUTO: 274 K/UL (ref 130–450)
PMV BLD AUTO: 10.1 FL (ref 7–12)
POTASSIUM SERPL-SCNC: 3.8 MMOL/L (ref 3.5–5)
PROT SERPL-MCNC: 6.3 G/DL (ref 6.4–8.3)
RBC # BLD AUTO: 4.33 M/UL (ref 3.5–5.5)
SODIUM SERPL-SCNC: 142 MMOL/L (ref 132–146)
SPECIMEN DESCRIPTION: NORMAL
VIT B12 SERPL-MCNC: 589 PG/ML (ref 211–946)
WBC OTHER # BLD: 6.9 K/UL (ref 4.5–11.5)

## 2023-08-17 PROCEDURE — 6360000002 HC RX W HCPCS: Performed by: STUDENT IN AN ORGANIZED HEALTH CARE EDUCATION/TRAINING PROGRAM

## 2023-08-17 PROCEDURE — 82607 VITAMIN B-12: CPT

## 2023-08-17 PROCEDURE — 83036 HEMOGLOBIN GLYCOSYLATED A1C: CPT

## 2023-08-17 PROCEDURE — 6360000002 HC RX W HCPCS

## 2023-08-17 PROCEDURE — 2580000003 HC RX 258

## 2023-08-17 PROCEDURE — 6370000000 HC RX 637 (ALT 250 FOR IP)

## 2023-08-17 PROCEDURE — 82746 ASSAY OF FOLIC ACID SERUM: CPT

## 2023-08-17 PROCEDURE — 85025 COMPLETE CBC W/AUTO DIFF WBC: CPT

## 2023-08-17 PROCEDURE — 80053 COMPREHEN METABOLIC PANEL: CPT

## 2023-08-17 PROCEDURE — 99222 1ST HOSP IP/OBS MODERATE 55: CPT | Performed by: FAMILY MEDICINE

## 2023-08-17 RX ORDER — DICYCLOMINE HYDROCHLORIDE 10 MG/1
10 CAPSULE ORAL 3 TIMES DAILY PRN
Status: DISCONTINUED | OUTPATIENT
Start: 2023-08-17 | End: 2023-08-17 | Stop reason: HOSPADM

## 2023-08-17 RX ORDER — KETOROLAC TROMETHAMINE 30 MG/ML
15 INJECTION, SOLUTION INTRAMUSCULAR; INTRAVENOUS ONCE
Status: COMPLETED | OUTPATIENT
Start: 2023-08-17 | End: 2023-08-17

## 2023-08-17 RX ORDER — ACETAMINOPHEN 650 MG/1
650 SUPPOSITORY RECTAL EVERY 6 HOURS PRN
Status: DISCONTINUED | OUTPATIENT
Start: 2023-08-17 | End: 2023-08-17 | Stop reason: HOSPADM

## 2023-08-17 RX ORDER — POLYETHYLENE GLYCOL 3350 17 G/17G
17 POWDER, FOR SOLUTION ORAL DAILY PRN
Status: DISCONTINUED | OUTPATIENT
Start: 2023-08-17 | End: 2023-08-17 | Stop reason: HOSPADM

## 2023-08-17 RX ORDER — PREDNISONE 20 MG/1
20 TABLET ORAL 2 TIMES DAILY
Qty: 10 TABLET | Refills: 0 | Status: ON HOLD | OUTPATIENT
Start: 2023-08-17 | End: 2023-08-19

## 2023-08-17 RX ORDER — KETOROLAC TROMETHAMINE 30 MG/ML
30 INJECTION, SOLUTION INTRAMUSCULAR; INTRAVENOUS ONCE
Status: DISCONTINUED | OUTPATIENT
Start: 2023-08-17 | End: 2023-08-17

## 2023-08-17 RX ORDER — OMEPRAZOLE 20 MG/1
20 CAPSULE, DELAYED RELEASE ORAL 2 TIMES DAILY
Qty: 30 CAPSULE | Refills: 0 | Status: ON HOLD | OUTPATIENT
Start: 2023-08-17

## 2023-08-17 RX ORDER — SODIUM CHLORIDE 9 MG/ML
INJECTION, SOLUTION INTRAVENOUS PRN
Status: DISCONTINUED | OUTPATIENT
Start: 2023-08-17 | End: 2023-08-17 | Stop reason: HOSPADM

## 2023-08-17 RX ORDER — PANTOPRAZOLE SODIUM 40 MG/1
40 TABLET, DELAYED RELEASE ORAL
Status: DISCONTINUED | OUTPATIENT
Start: 2023-08-17 | End: 2023-08-17 | Stop reason: HOSPADM

## 2023-08-17 RX ORDER — PROCHLORPERAZINE MALEATE 5 MG/1
5 TABLET ORAL EVERY 6 HOURS PRN
Qty: 20 TABLET | Refills: 0 | Status: SHIPPED | OUTPATIENT
Start: 2023-08-17 | End: 2023-08-23

## 2023-08-17 RX ORDER — SODIUM CHLORIDE 0.9 % (FLUSH) 0.9 %
5-40 SYRINGE (ML) INJECTION EVERY 12 HOURS SCHEDULED
Status: DISCONTINUED | OUTPATIENT
Start: 2023-08-17 | End: 2023-08-17 | Stop reason: HOSPADM

## 2023-08-17 RX ORDER — PROCHLORPERAZINE EDISYLATE 5 MG/ML
10 INJECTION INTRAMUSCULAR; INTRAVENOUS EVERY 6 HOURS PRN
Status: DISCONTINUED | OUTPATIENT
Start: 2023-08-17 | End: 2023-08-17 | Stop reason: HOSPADM

## 2023-08-17 RX ORDER — HYDROXYZINE PAMOATE 25 MG/1
25 CAPSULE ORAL 3 TIMES DAILY PRN
Status: DISCONTINUED | OUTPATIENT
Start: 2023-08-17 | End: 2023-08-17

## 2023-08-17 RX ORDER — HYDROXYZINE PAMOATE 25 MG/1
25 CAPSULE ORAL 3 TIMES DAILY
Status: DISCONTINUED | OUTPATIENT
Start: 2023-08-17 | End: 2023-08-17 | Stop reason: HOSPADM

## 2023-08-17 RX ORDER — SODIUM CHLORIDE 9 MG/ML
INJECTION, SOLUTION INTRAVENOUS CONTINUOUS
Status: DISCONTINUED | OUTPATIENT
Start: 2023-08-17 | End: 2023-08-17

## 2023-08-17 RX ORDER — SODIUM CHLORIDE 0.9 % (FLUSH) 0.9 %
5-40 SYRINGE (ML) INJECTION PRN
Status: DISCONTINUED | OUTPATIENT
Start: 2023-08-17 | End: 2023-08-17 | Stop reason: HOSPADM

## 2023-08-17 RX ORDER — VITAMIN B COMPLEX
1000 TABLET ORAL DAILY
Status: DISCONTINUED | OUTPATIENT
Start: 2023-08-17 | End: 2023-08-17 | Stop reason: HOSPADM

## 2023-08-17 RX ORDER — ENOXAPARIN SODIUM 100 MG/ML
30 INJECTION SUBCUTANEOUS DAILY
Status: DISCONTINUED | OUTPATIENT
Start: 2023-08-17 | End: 2023-08-17 | Stop reason: HOSPADM

## 2023-08-17 RX ORDER — CALCIUM CARBONATE 500 MG/1
1 TABLET, CHEWABLE ORAL DAILY
Qty: 30 TABLET | Refills: 0 | Status: ON HOLD | OUTPATIENT
Start: 2023-08-17 | End: 2023-08-21 | Stop reason: HOSPADM

## 2023-08-17 RX ORDER — ACETAMINOPHEN 325 MG/1
650 TABLET ORAL EVERY 6 HOURS PRN
Status: DISCONTINUED | OUTPATIENT
Start: 2023-08-17 | End: 2023-08-17 | Stop reason: HOSPADM

## 2023-08-17 RX ADMIN — HYDROXYZINE PAMOATE 25 MG: 25 CAPSULE ORAL at 10:52

## 2023-08-17 RX ADMIN — Medication 1000 UNITS: at 08:10

## 2023-08-17 RX ADMIN — SODIUM CHLORIDE: 9 INJECTION, SOLUTION INTRAVENOUS at 02:46

## 2023-08-17 RX ADMIN — KETOROLAC TROMETHAMINE 15 MG: 30 INJECTION, SOLUTION INTRAMUSCULAR; INTRAVENOUS at 17:24

## 2023-08-17 RX ADMIN — LIDOCAINE HYDROCHLORIDE: 20 SOLUTION ORAL; TOPICAL at 16:33

## 2023-08-17 RX ADMIN — PANTOPRAZOLE SODIUM 40 MG: 40 TABLET, DELAYED RELEASE ORAL at 06:21

## 2023-08-17 RX ADMIN — SODIUM CHLORIDE: 9 INJECTION, SOLUTION INTRAVENOUS at 12:54

## 2023-08-17 RX ADMIN — ENOXAPARIN SODIUM 30 MG: 100 INJECTION SUBCUTANEOUS at 08:10

## 2023-08-17 ASSESSMENT — PAIN DESCRIPTION - LOCATION
LOCATION: ABDOMEN
LOCATION: ABDOMEN

## 2023-08-17 ASSESSMENT — PAIN DESCRIPTION - ORIENTATION
ORIENTATION: LEFT;LOWER
ORIENTATION: RIGHT;LEFT

## 2023-08-17 ASSESSMENT — PAIN SCALES - GENERAL
PAINLEVEL_OUTOF10: 5
PAINLEVEL_OUTOF10: 6

## 2023-08-17 ASSESSMENT — PAIN DESCRIPTION - DESCRIPTORS
DESCRIPTORS: GNAWING;BURNING
DESCRIPTORS: BURNING;DISCOMFORT

## 2023-08-17 ASSESSMENT — ENCOUNTER SYMPTOMS
NAUSEA: 1
ABDOMINAL PAIN: 1
SORE THROAT: 0
VOMITING: 0
SHORTNESS OF BREATH: 0
DIARRHEA: 0
CONSTIPATION: 0

## 2023-08-17 NOTE — H&P
[address]   Resident History and Physical      CHIEF COMPLAINT:  dizziness, nausea, abdominal pain       History of Present Illness:   Braulio Quinonez  is a 79 y.o. female patient of Carrie Dumont DO  with a pertinent PMHx of irritable bowel syndrome, anxiety and depression, detached retina s/p repair 2010, HLD who presented to the [facility] ER and was transferred to [facility] with chief complaint of abdominal pain. This is the patient's fifth visit to the ED in the past three weeks. Initially she presented on 7/30/23 with chronic pain under left breast at which time cardiac workup was negative. On 8/5/23, presented to ED for dizziness, workup was negative for acute intracranial/vascular pathology. She was told to stop taking the Flexeril given to her for her breast pain. She was discharged with meclizine. She was admitted 8/7/23-8/9/23 [place] for dizziness. She was seen by neurology for BPPV-like symptoms and prescribed vestibular therapy. ENT was consulted and recommended considering ophthalmologic origin rather than BPPV due to negative Darcie-Hallpike. Discontinued meclizine. Patient seen in ED 8/14/23 for left-sided abdominal pain which she thinks was caused by the Zofran during her dizziness hospitalization. Also reported acid reflux sensation with 10/10 pain LUQ. Workup positive for sigmoid diverticulosis without diverticulitis, 5 mm right hepatic cyst vs hemangioma, and 1.5 cm left ovarian simple-appearing cyst. Diagnosed with UTI and discharged on Omnicef 300 mg BID for 7 days, omeprazole 20 mg QD, and Bentyl TID prn. Today, she presented to [facility] ED for abdominal pain, nausea. Pain 10/10, 5/10. Denies history of stomach ulcers, black or tarry stools, bloody stools, or NSAID use. Took three Zofran ODTs on Sunday and noticed a \"gnawing\" in her stomach. Did not take Omnicef for UTI, denies urinary sxs. Reports feels like a lot of stomach acid.  Pain bothered by k/uL   CMP    Collection Time: 08/16/23 11:45 AM   Result Value Ref Range    Glucose 108 (H) 74 - 99 mg/dL    BUN 24 (H) 6 - 23 mg/dL    Creatinine 0.9 0.50 - 1.00 mg/dL    Est, Glom Filt Rate >60 >60 mL/min/1.73m2    Calcium 9.7 8.6 - 10.2 mg/dL    Sodium 138 132 - 146 mmol/L    Potassium 4.3 3.5 - 5.0 mmol/L    Chloride 103 98 - 107 mmol/L    CO2 24 22 - 29 mmol/L    Anion Gap 11 7 - 16 mmol/L    Alkaline Phosphatase 85 35 - 104 U/L    ALT 22 0 - 32 U/L    AST 20 0 - 31 U/L    Total Bilirubin 0.6 0.0 - 1.2 mg/dL    Total Protein 7.1 6.4 - 8.3 g/dL    Albumin 4.6 3.5 - 5.2 g/dL   Lipase    Collection Time: 08/16/23 11:45 AM   Result Value Ref Range    Lipase 29 13 - 60 U/L   Lactic Acid    Collection Time: 08/16/23 11:45 AM   Result Value Ref Range    Lactic Acid 1.4 0.5 - 2.2 mmol/L   Troponin    Collection Time: 08/16/23 11:45 AM   Result Value Ref Range    Troponin, High Sensitivity 7 0 - 9 ng/L   Urinalysis    Collection Time: 08/16/23 11:45 AM   Result Value Ref Range    Color, UA Yellow Yellow    Turbidity UA Clear Clear    Glucose, Ur NEGATIVE NEGATIVE mg/dL    Bilirubin Urine NEGATIVE NEGATIVE    Ketones, Urine >80 (A) NEGATIVE mg/dL    Specific Gravity, UA >1.030 (H) 1.005 - 1.030    Urine Hgb TRACE (A) NEGATIVE    pH, UA 5.5 5.0 - 9.0    Protein, UA NEGATIVE NEGATIVE mg/dL    Urobilinogen, Urine 0.2 0.0 - 1.0 EU/dL    Nitrite, Urine NEGATIVE NEGATIVE    Leukocyte Esterase, Urine NEGATIVE NEGATIVE   Microscopic Urinalysis    Collection Time: 08/16/23 11:45 AM   Result Value Ref Range    WBC, UA 0 TO 5 /HPF    RBC, UA 0 TO 2 /HPF   EKG 12 Lead    Collection Time: 08/16/23 11:51 AM   Result Value Ref Range    Ventricular Rate 66 BPM    Atrial Rate 66 BPM    P-R Interval 102 ms    QRS Duration 76 ms    Q-T Interval 398 ms    QTc Calculation (Bazett) 417 ms    P Axis 79 degrees    R Axis 82 degrees    T Axis 65 degrees       No orders to display       ASSESSMENT/PLAN:      Active Hospital Problems    Diagnosis

## 2023-08-17 NOTE — PROGRESS NOTES
Talked to [address] [place] [address], Ophthalmologist.   Discussed pt details with her. According to her, there is no urgent need for eye evaluation deniz eye can not cause vertigo and dizziness. She mentioned that pt can be dc if appropriate and advise pt to follow up with her previous ophthalmologist in [facility] or with Dr. Andrés Keenan. Nellie Ivye. MD.   PGY2, FM.

## 2023-08-17 NOTE — CARE COORDINATION
Transition of Care-Met with patient at the bedside, frequent ED encounters and was just discharged 8/9 from [facility]. She was admitted for vertigo this time. Met with patient at the bedside, introduced myself and CM role in discharge planning. She lives with her  in a one story home, she reports being independent, no DME in use. PCP is Dr. Severino Garcia, preferred pharmacy is [facility] on [address],[place] [address]. Patient does not anticipate any discharge needs, her  to transport home. [id number] hrs.     Keo Coto BSN, RN  [facility]

## 2023-08-17 NOTE — PLAN OF CARE
Problem: Pain  Goal: Verbalizes/displays adequate comfort level or baseline comfort level  8/17/2023 1853 by Adri Merritt RN  Outcome: Adequate for Discharge  8/17/2023 1250 by Mienrva Cason RN  Outcome: Progressing

## 2023-08-17 NOTE — PROGRESS NOTES
4 Eyes Skin Assessment     NAME:  Bautista Vega  YOB: 1955  MEDICAL RECORD NUMBER:  [id number]    The patient is being assessed for  Admission    I agree that at least one RN has performed a thorough Head to Toe Skin Assessment on the patient. ALL assessment sites listed below have been assessed. Areas assessed by both nurses:    Head, Face, Ears, Shoulders, Back, Chest, Arms, Elbows, Hands, Sacrum. Buttock, Coccyx, Ischium, Legs. Feet and Heels, and Under Medical Devices         Does the Patient have a Wound?  No noted wound(s)       Edison Prevention initiated by RN: No  Wound Care Orders initiated by RN: No    Pressure Injury (Stage 3,4, Unstageable, DTI, NWPT, and Complex wounds) if present, place Wound referral order by RN under : No    New Ostomies, if present place, Ostomy referral order under : No     Nurse 1 eSignature: Electronically signed by Murleen Aschoff, RN on 8/17/23 at 1:32 AM EDT    **SHARE this note so that the co-signing nurse can place an eSignature**    Nurse 2 eSignature: Electronically signed by Juvenal Guerra RN on 8/17/23 at 3:44 AM EDT

## 2023-08-17 NOTE — PROGRESS NOTES
CLINICAL PHARMACY NOTE: MEDS TO BEDS    Total # of Prescriptions Filled: 1   The following medications were delivered to the patient:  Prednisone 20 mg    Additional Documentation:

## 2023-08-17 NOTE — PATIENT CARE CONFERENCE
P Quality Flow/Interdisciplinary Rounds Progress Note        Quality Flow Rounds held on August 17, 2023    Disciplines Attending:  Bedside Nurse, , , and Nursing Unit Leadership    Usha  was admitted on 8/16/2023 11:32 PM    Anticipated Discharge Date:       Disposition:    Edison Score:  Edison Scale Score: 20    Readmission Risk              Risk of Unplanned Readmission:  17           Discussed patient goal for the day, patient clinical progression, and barriers to discharge.   The following Goal(s) of the Day/Commitment(s) have been identified:  Labs - Report Results      Chris Curtis RN  August 17, 2023

## 2023-08-18 ENCOUNTER — HOSPITAL ENCOUNTER (INPATIENT)
Age: 68
LOS: 3 days | Discharge: HOME | DRG: 441 | End: 2023-08-21
Attending: INTERNAL MEDICINE | Admitting: INTERNAL MEDICINE
Payer: MEDICARE

## 2023-08-18 ENCOUNTER — APPOINTMENT (OUTPATIENT)
Dept: CT IMAGING | Age: 68
DRG: 441 | End: 2023-08-18
Attending: STUDENT IN AN ORGANIZED HEALTH CARE EDUCATION/TRAINING PROGRAM
Payer: MEDICARE

## 2023-08-18 ENCOUNTER — TELEPHONE (OUTPATIENT)
Dept: FAMILY MEDICINE CLINIC | Age: 68
End: 2023-08-18

## 2023-08-18 DIAGNOSIS — I81 PORTAL VEIN THROMBOSIS: Primary | ICD-10-CM

## 2023-08-18 DIAGNOSIS — R10.9 ABDOMINAL PAIN, UNSPECIFIED ABDOMINAL LOCATION: ICD-10-CM

## 2023-08-18 LAB
ALBUMIN SERPL-MCNC: 4.5 G/DL (ref 3.5–5.2)
ALP SERPL-CCNC: 84 U/L (ref 35–104)
ALT SERPL-CCNC: 25 U/L (ref 0–32)
ANION GAP SERPL CALCULATED.3IONS-SCNC: 12 MMOL/L (ref 7–16)
AST SERPL-CCNC: 23 U/L (ref 0–31)
BASOPHILS # BLD: 0 K/UL (ref 0–0.2)
BASOPHILS NFR BLD: 0 % (ref 0–2)
BILIRUB SERPL-MCNC: 0.3 MG/DL (ref 0–1.2)
BILIRUB UR QL STRIP: NEGATIVE
BUN SERPL-MCNC: 16 MG/DL (ref 6–23)
CALCIUM SERPL-MCNC: 9.6 MG/DL (ref 8.6–10.2)
CHLORIDE SERPL-SCNC: 105 MMOL/L (ref 98–107)
CLARITY UR: CLEAR
CO2 SERPL-SCNC: 23 MMOL/L (ref 22–29)
COLOR UR: YELLOW
CREAT SERPL-MCNC: 0.8 MG/DL (ref 0.5–1)
EKG ATRIAL RATE: 73 BPM
EKG P AXIS: 64 DEGREES
EKG P-R INTERVAL: 112 MS
EKG Q-T INTERVAL: 390 MS
EKG QRS DURATION: 72 MS
EKG QTC CALCULATION (BAZETT): 429 MS
EKG R AXIS: 77 DEGREES
EKG T AXIS: 50 DEGREES
EKG VENTRICULAR RATE: 73 BPM
EOSINOPHIL # BLD: 0 K/UL (ref 0.05–0.5)
EOSINOPHILS RELATIVE PERCENT: 0 % (ref 0–6)
ERYTHROCYTE [DISTWIDTH] IN BLOOD BY AUTOMATED COUNT: 12.9 % (ref 11.5–15)
ERYTHROCYTE [DISTWIDTH] IN BLOOD BY AUTOMATED COUNT: 13.1 % (ref 11.5–15)
GFR SERPL CREATININE-BSD FRML MDRD: >60 ML/MIN/1.73M2
GLUCOSE SERPL-MCNC: 145 MG/DL (ref 74–99)
GLUCOSE UR STRIP-MCNC: NEGATIVE MG/DL
HCT VFR BLD AUTO: 40.4 % (ref 34–48)
HCT VFR BLD AUTO: 42.2 % (ref 34–48)
HGB BLD-MCNC: 13.3 G/DL (ref 11.5–15.5)
HGB BLD-MCNC: 14 G/DL (ref 11.5–15.5)
HGB UR QL STRIP.AUTO: ABNORMAL
KETONES UR STRIP-MCNC: >80 MG/DL
LACTATE BLDV-SCNC: 1.3 MMOL/L (ref 0.5–2.2)
LEUKOCYTE ESTERASE UR QL STRIP: NEGATIVE
LIPASE SERPL-CCNC: 29 U/L (ref 13–60)
LYMPHOCYTES NFR BLD: 0.24 K/UL (ref 1.5–4)
LYMPHOCYTES RELATIVE PERCENT: 4 % (ref 20–42)
MCH RBC QN AUTO: 30 PG (ref 26–35)
MCH RBC QN AUTO: 30 PG (ref 26–35)
MCHC RBC AUTO-ENTMCNC: 32.9 G/DL (ref 32–34.5)
MCHC RBC AUTO-ENTMCNC: 33.2 G/DL (ref 32–34.5)
MCV RBC AUTO: 90.4 FL (ref 80–99.9)
MCV RBC AUTO: 91.2 FL (ref 80–99.9)
MONOCYTES NFR BLD: 0 % (ref 2–12)
MONOCYTES NFR BLD: 0 K/UL (ref 0.1–0.95)
MUCOUS THREADS URNS QL MICRO: PRESENT
NEUTROPHILS NFR BLD: 97 % (ref 43–80)
NEUTS SEG NFR BLD: 6.56 K/UL (ref 1.8–7.3)
NITRITE UR QL STRIP: NEGATIVE
PARTIAL THROMBOPLASTIN TIME: 28.3 SEC (ref 24.5–35.1)
PH UR STRIP: 5.5 [PH] (ref 5–9)
PLATELET # BLD AUTO: 293 K/UL (ref 130–450)
PLATELET # BLD AUTO: 307 K/UL (ref 130–450)
PMV BLD AUTO: 10.3 FL (ref 7–12)
PMV BLD AUTO: 10.4 FL (ref 7–12)
POTASSIUM SERPL-SCNC: 4.4 MMOL/L (ref 3.5–5)
PROT SERPL-MCNC: 7.1 G/DL (ref 6.4–8.3)
PROT UR STRIP-MCNC: NEGATIVE MG/DL
RBC # BLD AUTO: 4.43 M/UL (ref 3.5–5.5)
RBC # BLD AUTO: 4.67 M/UL (ref 3.5–5.5)
RBC # BLD: ABNORMAL 10*6/UL
RBC # BLD: ABNORMAL 10*6/UL
RBC #/AREA URNS HPF: ABNORMAL /HPF
SODIUM SERPL-SCNC: 140 MMOL/L (ref 132–146)
SP GR UR STRIP: >1.03 (ref 1–1.03)
TROPONIN I SERPL HS-MCNC: 7 NG/L (ref 0–9)
UROBILINOGEN UR STRIP-ACNC: 0.2 EU/DL (ref 0–1)
WBC #/AREA URNS HPF: ABNORMAL /HPF
WBC OTHER # BLD: 6.8 K/UL (ref 4.5–11.5)
WBC OTHER # BLD: 9.6 K/UL (ref 4.5–11.5)

## 2023-08-18 PROCEDURE — 93005 ELECTROCARDIOGRAM TRACING: CPT | Performed by: STUDENT IN AN ORGANIZED HEALTH CARE EDUCATION/TRAINING PROGRAM

## 2023-08-18 PROCEDURE — 74174 CTA ABD&PLVS W/CONTRAST: CPT

## 2023-08-18 PROCEDURE — 84484 ASSAY OF TROPONIN QUANT: CPT

## 2023-08-18 PROCEDURE — A4216 STERILE WATER/SALINE, 10 ML: HCPCS | Performed by: STUDENT IN AN ORGANIZED HEALTH CARE EDUCATION/TRAINING PROGRAM

## 2023-08-18 PROCEDURE — 99223 1ST HOSP IP/OBS HIGH 75: CPT | Performed by: SURGERY

## 2023-08-18 PROCEDURE — 83690 ASSAY OF LIPASE: CPT

## 2023-08-18 PROCEDURE — 2580000003 HC RX 258: Performed by: STUDENT IN AN ORGANIZED HEALTH CARE EDUCATION/TRAINING PROGRAM

## 2023-08-18 PROCEDURE — 81001 URINALYSIS AUTO W/SCOPE: CPT

## 2023-08-18 PROCEDURE — 2060000000 HC ICU INTERMEDIATE R&B

## 2023-08-18 PROCEDURE — 6360000004 HC RX CONTRAST MEDICATION: Performed by: RADIOLOGY

## 2023-08-18 PROCEDURE — 99285 EMERGENCY DEPT VISIT HI MDM: CPT

## 2023-08-18 PROCEDURE — 93010 ELECTROCARDIOGRAM REPORT: CPT | Performed by: INTERNAL MEDICINE

## 2023-08-18 PROCEDURE — 2500000003 HC RX 250 WO HCPCS: Performed by: STUDENT IN AN ORGANIZED HEALTH CARE EDUCATION/TRAINING PROGRAM

## 2023-08-18 PROCEDURE — 96374 THER/PROPH/DIAG INJ IV PUSH: CPT

## 2023-08-18 PROCEDURE — 85025 COMPLETE CBC W/AUTO DIFF WBC: CPT

## 2023-08-18 PROCEDURE — 80053 COMPREHEN METABOLIC PANEL: CPT

## 2023-08-18 PROCEDURE — 6360000002 HC RX W HCPCS: Performed by: STUDENT IN AN ORGANIZED HEALTH CARE EDUCATION/TRAINING PROGRAM

## 2023-08-18 PROCEDURE — 83605 ASSAY OF LACTIC ACID: CPT

## 2023-08-18 PROCEDURE — 85027 COMPLETE CBC AUTOMATED: CPT

## 2023-08-18 PROCEDURE — 6370000000 HC RX 637 (ALT 250 FOR IP): Performed by: STUDENT IN AN ORGANIZED HEALTH CARE EDUCATION/TRAINING PROGRAM

## 2023-08-18 PROCEDURE — 85730 THROMBOPLASTIN TIME PARTIAL: CPT

## 2023-08-18 RX ORDER — DICYCLOMINE HYDROCHLORIDE 10 MG/1
10 CAPSULE ORAL ONCE
Status: COMPLETED | OUTPATIENT
Start: 2023-08-18 | End: 2023-08-18

## 2023-08-18 RX ORDER — HEPARIN SODIUM 1000 [USP'U]/ML
80 INJECTION, SOLUTION INTRAVENOUS; SUBCUTANEOUS ONCE
Status: COMPLETED | OUTPATIENT
Start: 2023-08-18 | End: 2023-08-18

## 2023-08-18 RX ORDER — HEPARIN SODIUM 1000 [USP'U]/ML
40 INJECTION, SOLUTION INTRAVENOUS; SUBCUTANEOUS PRN
Status: DISCONTINUED | OUTPATIENT
Start: 2023-08-18 | End: 2023-08-21

## 2023-08-18 RX ORDER — HEPARIN SODIUM 10000 [USP'U]/100ML
5-30 INJECTION, SOLUTION INTRAVENOUS CONTINUOUS
Status: DISCONTINUED | OUTPATIENT
Start: 2023-08-18 | End: 2023-08-21

## 2023-08-18 RX ORDER — ONDANSETRON 2 MG/ML
4 INJECTION INTRAMUSCULAR; INTRAVENOUS ONCE
Status: DISCONTINUED | OUTPATIENT
Start: 2023-08-18 | End: 2023-08-18

## 2023-08-18 RX ORDER — MORPHINE SULFATE 2 MG/ML
2 INJECTION, SOLUTION INTRAMUSCULAR; INTRAVENOUS ONCE
Status: COMPLETED | OUTPATIENT
Start: 2023-08-18 | End: 2023-08-18

## 2023-08-18 RX ORDER — PROCHLORPERAZINE EDISYLATE 5 MG/ML
10 INJECTION INTRAMUSCULAR; INTRAVENOUS ONCE
Status: COMPLETED | OUTPATIENT
Start: 2023-08-18 | End: 2023-08-18

## 2023-08-18 RX ORDER — HEPARIN SODIUM 1000 [USP'U]/ML
80 INJECTION, SOLUTION INTRAVENOUS; SUBCUTANEOUS PRN
Status: DISCONTINUED | OUTPATIENT
Start: 2023-08-18 | End: 2023-08-21

## 2023-08-18 RX ORDER — 0.9 % SODIUM CHLORIDE 0.9 %
1000 INTRAVENOUS SOLUTION INTRAVENOUS ONCE
Status: COMPLETED | OUTPATIENT
Start: 2023-08-18 | End: 2023-08-18

## 2023-08-18 RX ADMIN — HEPARIN SODIUM 3990 UNITS: 1000 INJECTION INTRAVENOUS; SUBCUTANEOUS at 18:25

## 2023-08-18 RX ADMIN — PROCHLORPERAZINE EDISYLATE 10 MG: 5 INJECTION INTRAMUSCULAR; INTRAVENOUS at 19:34

## 2023-08-18 RX ADMIN — DICYCLOMINE HYDROCHLORIDE 10 MG: 10 CAPSULE ORAL at 08:44

## 2023-08-18 RX ADMIN — Medication: at 08:44

## 2023-08-18 RX ADMIN — IOPAMIDOL 90 ML: 755 INJECTION, SOLUTION INTRAVENOUS at 12:06

## 2023-08-18 RX ADMIN — FAMOTIDINE 20 MG: 10 INJECTION, SOLUTION INTRAVENOUS at 08:43

## 2023-08-18 RX ADMIN — SODIUM CHLORIDE 1000 ML: 9 INJECTION, SOLUTION INTRAVENOUS at 12:50

## 2023-08-18 RX ADMIN — MORPHINE SULFATE 2 MG: 2 INJECTION, SOLUTION INTRAMUSCULAR; INTRAVENOUS at 20:09

## 2023-08-18 RX ADMIN — HEPARIN SODIUM 18 UNITS/KG/HR: 10000 INJECTION, SOLUTION INTRAVENOUS at 18:28

## 2023-08-18 ASSESSMENT — ENCOUNTER SYMPTOMS
ABDOMINAL DISTENTION: 0
SHORTNESS OF BREATH: 0
PHOTOPHOBIA: 0
VOMITING: 0
NAUSEA: 1
DIARRHEA: 0
CHEST TIGHTNESS: 0
COUGH: 0
ABDOMINAL PAIN: 1

## 2023-08-18 ASSESSMENT — PAIN DESCRIPTION - LOCATION: LOCATION: ABDOMEN

## 2023-08-18 ASSESSMENT — PAIN SCALES - GENERAL: PAINLEVEL_OUTOF10: 10

## 2023-08-18 NOTE — ED PROVIDER NOTES
Miya Damon is a 79year old female who presented to ED with concern for abdominal pain. Patient has had increasing abdominal pain. Patient has had symptoms present for several days and are worsening. Patient's pain is a stabbing pain. Patient has had recent ct scan and ultrasound. Patient's pain is constant and worsening. Patient denies fever, chills, chest pain, shortness of breath. Patient denies diarrhea. The history is provided by the patient and medical records. Review of Systems   Constitutional:  Positive for fatigue. Negative for chills, diaphoresis and fever. Eyes:  Negative for photophobia and visual disturbance. Respiratory:  Negative for cough, chest tightness and shortness of breath. Cardiovascular:  Negative for chest pain, palpitations and leg swelling. Gastrointestinal:  Positive for abdominal pain and nausea. Negative for abdominal distention, diarrhea and vomiting. Genitourinary:  Negative for dysuria. Musculoskeletal:  Negative for neck pain and neck stiffness. Skin:  Negative for pallor and rash. Neurological:  Negative for headaches. Psychiatric/Behavioral:  Negative for confusion. Physical Exam  Vitals and nursing note reviewed. Constitutional:       General: She is in acute distress. Appearance: She is ill-appearing. HENT:      Head: Normocephalic and atraumatic. Eyes:      General: No scleral icterus. Conjunctiva/sclera: Conjunctivae normal.      Pupils: Pupils are equal, round, and reactive to light. Cardiovascular:      Rate and Rhythm: Normal rate and regular rhythm. Pulmonary:      Effort: Pulmonary effort is normal.      Breath sounds: Normal breath sounds. Abdominal:      General: Bowel sounds are normal. There is no distension. Palpations: Abdomen is soft. Tenderness: There is abdominal tenderness. There is no guarding or rebound. Musculoskeletal:      Cervical back: Normal range of motion and neck supple.  No

## 2023-08-18 NOTE — TELEPHONE ENCOUNTER
Pt was sitting on the floor curled up in a ball in the hallway this morning when I was walking into work. Pt sees pcp Dr. Maria Esther Tran. Pt kept requesting to see a Dr. I advised pt that no doctors were in today and that she needs to go to ER. Pt c/o abdominal pain and stated that it is so bad. Pt was visibly upset. Pt was with a shraddha. They stated they already seen ER yesterday and they did nothing. Advised pt once again to go to ER. Expressed the importance of the ER. Pt stated she will go to ER again.

## 2023-08-19 ENCOUNTER — APPOINTMENT (OUTPATIENT)
Dept: MRI IMAGING | Age: 68
DRG: 441 | End: 2023-08-19
Payer: MEDICARE

## 2023-08-19 LAB
ALBUMIN SERPL-MCNC: 4 G/DL (ref 3.5–5.2)
ALP SERPL-CCNC: 68 U/L (ref 35–104)
ALT SERPL-CCNC: 19 U/L (ref 0–32)
ANION GAP SERPL CALCULATED.3IONS-SCNC: 12 MMOL/L (ref 7–16)
AST SERPL-CCNC: 18 U/L (ref 0–31)
BILIRUB SERPL-MCNC: 0.4 MG/DL (ref 0–1.2)
BUN SERPL-MCNC: 11 MG/DL (ref 6–23)
CALCIUM SERPL-MCNC: 9.1 MG/DL (ref 8.6–10.2)
CHLORIDE SERPL-SCNC: 105 MMOL/L (ref 98–107)
CO2 SERPL-SCNC: 24 MMOL/L (ref 22–29)
CREAT SERPL-MCNC: 0.8 MG/DL (ref 0.5–1)
ERYTHROCYTE [DISTWIDTH] IN BLOOD BY AUTOMATED COUNT: 13.1 % (ref 11.5–15)
GFR SERPL CREATININE-BSD FRML MDRD: >60 ML/MIN/1.73M2
GLUCOSE SERPL-MCNC: 82 MG/DL (ref 74–99)
HCT VFR BLD AUTO: 37.7 % (ref 34–48)
HGB BLD-MCNC: 12.7 G/DL (ref 11.5–15.5)
MCH RBC QN AUTO: 30.8 PG (ref 26–35)
MCHC RBC AUTO-ENTMCNC: 33.7 G/DL (ref 32–34.5)
MCV RBC AUTO: 91.3 FL (ref 80–99.9)
PARTIAL THROMBOPLASTIN TIME: 196 SEC (ref 24.5–35.1)
PARTIAL THROMBOPLASTIN TIME: 55.9 SEC (ref 24.5–35.1)
PARTIAL THROMBOPLASTIN TIME: 58.8 SEC (ref 24.5–35.1)
PLATELET # BLD AUTO: 251 K/UL (ref 130–450)
PMV BLD AUTO: 10.3 FL (ref 7–12)
POTASSIUM SERPL-SCNC: 3.7 MMOL/L (ref 3.5–5)
PROT SERPL-MCNC: 6.2 G/DL (ref 6.4–8.3)
RBC # BLD AUTO: 4.13 M/UL (ref 3.5–5.5)
SODIUM SERPL-SCNC: 141 MMOL/L (ref 132–146)
WBC OTHER # BLD: 7.1 K/UL (ref 4.5–11.5)

## 2023-08-19 PROCEDURE — 6360000002 HC RX W HCPCS

## 2023-08-19 PROCEDURE — 85730 THROMBOPLASTIN TIME PARTIAL: CPT

## 2023-08-19 PROCEDURE — 85027 COMPLETE CBC AUTOMATED: CPT

## 2023-08-19 PROCEDURE — 80053 COMPREHEN METABOLIC PANEL: CPT

## 2023-08-19 PROCEDURE — 2060000000 HC ICU INTERMEDIATE R&B

## 2023-08-19 PROCEDURE — 36415 COLL VENOUS BLD VENIPUNCTURE: CPT

## 2023-08-19 PROCEDURE — 74183 MRI ABD W/O CNTR FLWD CNTR: CPT

## 2023-08-19 PROCEDURE — 6360000004 HC RX CONTRAST MEDICATION: Performed by: RADIOLOGY

## 2023-08-19 PROCEDURE — A9581 GADOXETATE DISODIUM INJ: HCPCS | Performed by: RADIOLOGY

## 2023-08-19 PROCEDURE — 6370000000 HC RX 637 (ALT 250 FOR IP): Performed by: INTERNAL MEDICINE

## 2023-08-19 RX ORDER — SODIUM CHLORIDE 9 MG/ML
INJECTION, SOLUTION INTRAVENOUS PRN
Status: DISCONTINUED | OUTPATIENT
Start: 2023-08-19 | End: 2023-08-21 | Stop reason: HOSPADM

## 2023-08-19 RX ORDER — PREDNISONE 20 MG/1
20 TABLET ORAL 2 TIMES DAILY
Status: ON HOLD | COMMUNITY
Start: 2023-08-18 | End: 2023-08-21 | Stop reason: HOSPADM

## 2023-08-19 RX ORDER — PANTOPRAZOLE SODIUM 40 MG/1
40 TABLET, DELAYED RELEASE ORAL DAILY
Status: DISCONTINUED | OUTPATIENT
Start: 2023-08-19 | End: 2023-08-21 | Stop reason: HOSPADM

## 2023-08-19 RX ORDER — PROCHLORPERAZINE MALEATE 5 MG/1
5 TABLET ORAL EVERY 6 HOURS PRN
Status: DISCONTINUED | OUTPATIENT
Start: 2023-08-19 | End: 2023-08-21 | Stop reason: HOSPADM

## 2023-08-19 RX ORDER — ONDANSETRON 2 MG/ML
4 INJECTION INTRAMUSCULAR; INTRAVENOUS EVERY 6 HOURS PRN
Status: DISCONTINUED | OUTPATIENT
Start: 2023-08-19 | End: 2023-08-21 | Stop reason: HOSPADM

## 2023-08-19 RX ORDER — SODIUM CHLORIDE 0.9 % (FLUSH) 0.9 %
5-40 SYRINGE (ML) INJECTION PRN
Status: DISCONTINUED | OUTPATIENT
Start: 2023-08-19 | End: 2023-08-21 | Stop reason: HOSPADM

## 2023-08-19 RX ORDER — DICYCLOMINE HYDROCHLORIDE 10 MG/1
10 CAPSULE ORAL 3 TIMES DAILY PRN
Status: DISCONTINUED | OUTPATIENT
Start: 2023-08-19 | End: 2023-08-21 | Stop reason: HOSPADM

## 2023-08-19 RX ORDER — SODIUM CHLORIDE 0.9 % (FLUSH) 0.9 %
5-40 SYRINGE (ML) INJECTION EVERY 12 HOURS SCHEDULED
Status: DISCONTINUED | OUTPATIENT
Start: 2023-08-19 | End: 2023-08-21 | Stop reason: HOSPADM

## 2023-08-19 RX ORDER — CALCIUM CARBONATE 500 MG/1
1 TABLET, CHEWABLE ORAL DAILY
Status: DISCONTINUED | OUTPATIENT
Start: 2023-08-19 | End: 2023-08-21 | Stop reason: HOSPADM

## 2023-08-19 RX ORDER — POTASSIUM CHLORIDE 7.45 MG/ML
10 INJECTION INTRAVENOUS
Status: DISCONTINUED | OUTPATIENT
Start: 2023-08-19 | End: 2023-08-19

## 2023-08-19 RX ORDER — ONDANSETRON 4 MG/1
4 TABLET, ORALLY DISINTEGRATING ORAL EVERY 8 HOURS PRN
Status: DISCONTINUED | OUTPATIENT
Start: 2023-08-19 | End: 2023-08-21 | Stop reason: HOSPADM

## 2023-08-19 RX ORDER — POLYETHYLENE GLYCOL 3350 17 G/17G
17 POWDER, FOR SOLUTION ORAL DAILY PRN
Status: DISCONTINUED | OUTPATIENT
Start: 2023-08-19 | End: 2023-08-21 | Stop reason: HOSPADM

## 2023-08-19 RX ORDER — POTASSIUM CHLORIDE 7.45 MG/ML
10 INJECTION INTRAVENOUS
Status: COMPLETED | OUTPATIENT
Start: 2023-08-19 | End: 2023-08-19

## 2023-08-19 RX ORDER — ACETAMINOPHEN 650 MG/1
650 SUPPOSITORY RECTAL EVERY 6 HOURS PRN
Status: DISCONTINUED | OUTPATIENT
Start: 2023-08-19 | End: 2023-08-21 | Stop reason: HOSPADM

## 2023-08-19 RX ORDER — ACETAMINOPHEN 325 MG/1
650 TABLET ORAL EVERY 6 HOURS PRN
Status: DISCONTINUED | OUTPATIENT
Start: 2023-08-19 | End: 2023-08-21 | Stop reason: HOSPADM

## 2023-08-19 RX ADMIN — GADOXETATE DISODIUM 5 ML: 181.43 INJECTION, SOLUTION INTRAVENOUS at 13:56

## 2023-08-19 RX ADMIN — POTASSIUM CHLORIDE 10 MEQ: 7.46 INJECTION, SOLUTION INTRAVENOUS at 11:46

## 2023-08-19 RX ADMIN — DICYCLOMINE HYDROCHLORIDE 10 MG: 10 CAPSULE ORAL at 06:18

## 2023-08-19 RX ADMIN — POTASSIUM CHLORIDE 10 MEQ: 7.46 INJECTION, SOLUTION INTRAVENOUS at 11:44

## 2023-08-19 NOTE — PROGRESS NOTES
Comprehensive Nutrition Assessment    Type and Reason for Visit:  Initial, Positive Nutrition Screen    Nutrition Recommendations/Plan:   Continue current diet as tolerated & monitor  Will add ONS     Malnutrition Assessment:  Malnutrition Status: At risk for malnutrition (Comment) (08/19/23 2156)    Context:  Acute Illness     Findings of the 6 clinical characteristics of malnutrition:  Energy Intake:  75% or less of estimated energy requirements for 7 or more days  Weight Loss:  Unable to assess (d/t UTO updated CBW)     Body Fat Loss:  Unable to assess     Muscle Mass Loss:  Unable to assess    Fluid Accumulation:  No significant fluid accumulation     Strength:  Not Performed    Nutrition Assessment:    Pt presents w/ abdominal pain, admits w/ portal vein thrombosis w/ unclear cause. Diet has been advanced, will add ONS to optimize nutrient intake & monitor. Nutrition Related Findings:    A&O X4, I&Os unavailable per doc flow, no edema, abd soft/flat, +BS, loss of appetite, nausea PTA Wound Type: None       Current Nutrition Intake & Therapies:    Average Meal Intake: Unable to assess (diet advanced at this time)  Average Supplements Intake: None Ordered  ADULT DIET; Regular    Anthropometric Measures:  Height: 5' 2\" (157.5 cm)  Ideal Body Weight (IBW): 110 lbs (50 kg)       Current Body Weight: 110 lb (49.9 kg), 100 % IBW.  Weight Source: Not Specified (8/18)  Current BMI (kg/m2): 20.1  Usual Body Weight: 114 lb 11 oz (52 kg) (11/14/22 actual per EMR OV)  % Weight Change (Calculated): -4.1                    BMI Categories: Underweight (BMI less than 22) age over 72    Estimated Daily Nutrient Needs:  Energy Requirements Based On: Kcal/kg  Weight Used for Energy Requirements: Current  Energy (kcal/day):   Weight Used for Protein Requirements: Current  Protein (g/day): 50-60 (1-1.2)  Method Used for Fluid Requirements: 1 ml/kcal  Fluid (ml/day):     Nutrition Diagnosis:   Inadequate oral

## 2023-08-19 NOTE — CONSULTS
HEPATOBILIARY AND PANCREATIC SURGERY  CONSULT NOTE  8/18/2023    Physician Consulted: Dr. Lasha Hicks  Reason for Consult: PV thrombosis  Referring Physician: Dr. Ruby Grubbs    [facility]  Ivory Romo is a 79 y.o. female with history of GERD, HTN, anxiety who presented to the ED on 8/18 secondary to 2 weeks of worsening right upper quadrant epigastric abdominal pain associated with nausea without emesis. Patient states that she was evaluated at another hospital for her abdominal pain and underwent a CT of the abdomen pelvis which was noted to be normal at that time. Patient states that her pain persisted which caused her to present to the ED today. Patient denies any associated emesis, hematemesis, melena, hematochezia, constipation, diarrhea. Patient underwent CTA of the abdomen and pelvis which demonstrated a filling defect within the portal vein extending into the SMV concerning for portal vein thrombosis. Patient was subsequently started on a heparin drip with hepatobiliary and vascular surgery consulted secondary to the above. Patient denies any history of prior DVT/PEs. Patient denies any current anticoagulation use. Patient denies any history of cirrhosis, alcohol abuse, pancreatitis, smoking, or inherited thrombophilias. Patient denies any family history of recurrence DVT/PEs. Patient denies any history of known vascular disease including PVD/CAD. Patient also denies any unintentional weight loss. Denies any past abdominal surgical or history of prior Colonoscopy. Initial laboratory work-up demonstrated, BMP unremarkable, lactic acid 1.3, LFTs WNL, WBC normal 6.8, hemoglobin normal 14.0.       Past Medical History:   Diagnosis Date    Abnormal Pap smear of cervix     Anxiety     Detached retina, right 08/17/2010,10/29/10    surgery, Dr. Sony Ramírez [facility]        Past Surgical History:   Procedure Laterality Date    CATARACT REMOVAL  07/17/11    also had scar tissue removed at this time from prev retinal surgeries

## 2023-08-19 NOTE — H&P
[address], [address] [place] [address]                              HISTORY AND PHYSICAL    PATIENT NAME: Jordin Pickering                      :        1955  MED REC NO:   [id number]                            ROOM:       [id number]  ACCOUNT NO:   [de-identified]                           ADMIT DATE: 2023  PROVIDER:     Yuliana Arango DO    CHIEF COMPLAINT:  Abdominal pain. HISTORY OF PRESENT ILLNESS:  The patient is a 72-year-old   female who presented to the emergency room complaining of one-week  history of abdominal pain, epigastric. She was seen at [facility]  Emergency Room and discharged home. She then had persistent pain and  presented to [address] [place] [address] Emergency Room. CTA of the  abdomen revealed a filling defect within the portal vein extending into  the SMV concerning for portal vein thrombosis. The patient was started  on heparin drip and was admitted for further evaluation and treatment. PAST MEDICAL HISTORY:  Confusion, detached retina, major depressive  disorder. PAST SURGICAL HISTORY:  Right eye detached retina surgery,  tonsillectomy, wisdom teeth extraction, right eye cataract surgery, left  wrist ganglion surgery. PRIMARY CARE PHYSICIAN:  Carrie Dumont DO    SOCIAL HISTORY:  No tobacco, no alcohol. MEDICATIONS PRIOR TO ADMISSION:  Deltasone, omeprazole, Compazine,  Bentyl, Zofran, all recently prescribed by [facility],  vitamin D.    PHYSICAL EXAMINATION:  GENERAL APPEARANCE:  Reveals a 72-year-old  female who is alert  and oriented x3, cooperative, and a fair historian. VITAL SIGNS:  On admission, temperature 98.3, pulse 88, respirations 16,  blood pressure 149/81. HEENT:  Head:  Normocephalic, atraumatic. Eyes:  Left eye pupil round  and reactive to light. Right eye minimally reactive to light.   Nose:   No obstruction, polyp or discharge

## 2023-08-19 NOTE — PROGRESS NOTES
4 Eyes Skin Assessment     NAME:  Terry Hidalgo  YOB: 1955  MEDICAL RECORD NUMBER:  [id number]    The patient is being assessed for  Admission    I agree that at least one RN has performed a thorough Head to Toe Skin Assessment on the patient. ALL assessment sites listed below have been assessed. Areas assessed by both nurses:    Head, Face, Ears, Shoulders, Back, Chest, Arms, Elbows, Hands, Sacrum. Buttock, Coccyx, Ischium, and Legs. Feet and Heels        Does the Patient have a Wound?  No noted wound(s)       Edison Prevention initiated by RN: No  Wound Care Orders initiated by RN: No    Pressure Injury (Stage 3,4, Unstageable, DTI, NWPT, and Complex wounds) if present, place Wound referral order by RN under : No    New Ostomies, if present place, Ostomy referral order under : No     Nurse 1 eSignature: Electronically signed by Adam Matias RN on 8/19/23 at 6:27 AM EDT    **SHARE this note so that the co-signing nurse can place an eSignature**    Nurse 2 eSignature: {Esignature:[id number]}

## 2023-08-19 NOTE — ED NOTES
Heparin was started at 1828, aptt re-timed to 0030 for Q6hr draws     Erica Quintero RN  08/18/23 9061

## 2023-08-19 NOTE — CONSULTS
Vascular Surgery Consultation Note    Reason for Consult:  PV thrombosis    HPI :    This is a 79 y.o. female with history of GERD, HTN, anxiety who presented to the ED on 8/18 secondary to 2 weeks of worsening right upper quadrant epigastric abdominal pain associated with nausea without emesis. Patient states that she was evaluated at another hospital for her abdominal pain and underwent a CT of the abdomen pelvis which was noted to be normal at that time. Patient states that her pain persisted which caused her to present to the ED today. Patient denies any associated emesis, hematemesis, melena, hematochezia, constipation, diarrhea. Patient underwent CTA of the abdomen and pelvis which demonstrated a filling defect within the portal vein extending into the SMV concerning for portal vein thrombosis. Patient was subsequently started on a heparin drip with hepatobiliary and vascular surgery consulted secondary to the above. Patient denies any history of prior DVT/PEs. Patient denies any current anticoagulation use. Patient denies any history of cirrhosis, alcohol abuse, pancreatitis, smoking, or inherited thrombophilias. Patient denies any family history of recurrence DVT/PEs. Patient denies any history of known vascular disease including PVD/CAD.   Initial laboratory work-up demonstrated, BMP unremarkable, lactic acid 1.3, LFTs WNL, WBC normal 6.8, hemoglobin normal 14.0.    ROS : Negative if blank [], Positive if [x]  General Vascular   [] Fevers [] Claudication (Blocks)   [] Chills [] Rest Pain   [] Weight Loss [] Tissue Loss   [] Chest Pain [] Clotting Disorder    [] SOB at rest [] Leg Swelling   [] SOB with exertion [] DVT/PE      [] Nausea    [] Vomitting [] Stroke/TIA   [] Abdominal Pain [] Focal weakness   [] Melena [] Slurred Speech   [] Hematochezia [] Vision Changes   [] Hematuria    [] Dysuria [] Hx of Central Catheters   [] Wears Glasses/Contacts  [] Dialysis and If so date initiated   []

## 2023-08-20 PROBLEM — R10.9 INTRACTABLE ABDOMINAL PAIN: Status: ACTIVE | Noted: 2023-08-20

## 2023-08-20 LAB
ERYTHROCYTE [DISTWIDTH] IN BLOOD BY AUTOMATED COUNT: 13.2 % (ref 11.5–15)
HCT VFR BLD AUTO: 39.4 % (ref 34–48)
HGB BLD-MCNC: 13 G/DL (ref 11.5–15.5)
MCH RBC QN AUTO: 30.7 PG (ref 26–35)
MCHC RBC AUTO-ENTMCNC: 33 G/DL (ref 32–34.5)
MCV RBC AUTO: 92.9 FL (ref 80–99.9)
PARTIAL THROMBOPLASTIN TIME: 128.7 SEC (ref 24.5–35.1)
PARTIAL THROMBOPLASTIN TIME: 35.4 SEC (ref 24.5–35.1)
PLATELET # BLD AUTO: 264 K/UL (ref 130–450)
PMV BLD AUTO: 10.3 FL (ref 7–12)
RBC # BLD AUTO: 4.24 M/UL (ref 3.5–5.5)
REASON FOR REJECTION: NORMAL
SPECIMEN SOURCE: NORMAL
WBC OTHER # BLD: 6.6 K/UL (ref 4.5–11.5)
ZZ NTE CLEAN UP: ORDERED TEST: NORMAL

## 2023-08-20 PROCEDURE — 85027 COMPLETE CBC AUTOMATED: CPT

## 2023-08-20 PROCEDURE — 6360000002 HC RX W HCPCS: Performed by: STUDENT IN AN ORGANIZED HEALTH CARE EDUCATION/TRAINING PROGRAM

## 2023-08-20 PROCEDURE — 6360000002 HC RX W HCPCS: Performed by: INTERNAL MEDICINE

## 2023-08-20 PROCEDURE — 2580000003 HC RX 258: Performed by: INTERNAL MEDICINE

## 2023-08-20 PROCEDURE — 85730 THROMBOPLASTIN TIME PARTIAL: CPT

## 2023-08-20 PROCEDURE — 6370000000 HC RX 637 (ALT 250 FOR IP): Performed by: INTERNAL MEDICINE

## 2023-08-20 PROCEDURE — 2060000000 HC ICU INTERMEDIATE R&B

## 2023-08-20 PROCEDURE — 36415 COLL VENOUS BLD VENIPUNCTURE: CPT

## 2023-08-20 RX ORDER — SODIUM CHLORIDE 9 MG/ML
INJECTION, SOLUTION INTRAVENOUS CONTINUOUS
Status: DISCONTINUED | OUTPATIENT
Start: 2023-08-20 | End: 2023-08-21

## 2023-08-20 RX ADMIN — ONDANSETRON 4 MG: 2 INJECTION INTRAMUSCULAR; INTRAVENOUS at 13:54

## 2023-08-20 RX ADMIN — HEPARIN SODIUM 9.5 UNITS/KG/HR: 10000 INJECTION, SOLUTION INTRAVENOUS at 10:23

## 2023-08-20 RX ADMIN — HEPARIN SODIUM 2000 UNITS: 1000 INJECTION INTRAVENOUS; SUBCUTANEOUS at 09:11

## 2023-08-20 RX ADMIN — PANTOPRAZOLE SODIUM 40 MG: 40 TABLET, DELAYED RELEASE ORAL at 07:50

## 2023-08-20 RX ADMIN — HEPARIN SODIUM 14 UNITS/KG/HR: 10000 INJECTION, SOLUTION INTRAVENOUS at 20:07

## 2023-08-20 RX ADMIN — SODIUM CHLORIDE, PRESERVATIVE FREE 10 ML: 5 INJECTION INTRAVENOUS at 07:51

## 2023-08-20 RX ADMIN — PROCHLORPERAZINE MALEATE 5 MG: 5 TABLET ORAL at 16:13

## 2023-08-20 RX ADMIN — SODIUM CHLORIDE, PRESERVATIVE FREE 10 ML: 5 INJECTION INTRAVENOUS at 20:07

## 2023-08-20 RX ADMIN — SODIUM CHLORIDE: 9 INJECTION, SOLUTION INTRAVENOUS at 10:30

## 2023-08-20 RX ADMIN — PROCHLORPERAZINE MALEATE 5 MG: 5 TABLET ORAL at 10:22

## 2023-08-20 ASSESSMENT — PAIN DESCRIPTION - DESCRIPTORS: DESCRIPTORS: ACHING;DISCOMFORT;CRAMPING

## 2023-08-20 ASSESSMENT — PAIN DESCRIPTION - PAIN TYPE: TYPE: ACUTE PAIN

## 2023-08-20 ASSESSMENT — PAIN SCALES - GENERAL: PAINLEVEL_OUTOF10: 4

## 2023-08-20 ASSESSMENT — PAIN DESCRIPTION - ORIENTATION: ORIENTATION: LEFT;LOWER

## 2023-08-20 ASSESSMENT — PAIN DESCRIPTION - LOCATION: LOCATION: ABDOMEN

## 2023-08-20 NOTE — PLAN OF CARE
Problem: Discharge Planning  Goal: Discharge to home or other facility with appropriate resources  Outcome: Progressing     Problem: Nutrition Deficit:  Goal: Optimize nutritional status  Outcome: Progressing     Problem: Pain  Goal: Verbalizes/displays adequate comfort level or baseline comfort level  Outcome: Progressing

## 2023-08-21 VITALS
TEMPERATURE: 98.4 F | BODY MASS INDEX: 20.24 KG/M2 | OXYGEN SATURATION: 96 % | WEIGHT: 110 LBS | RESPIRATION RATE: 18 BRPM | SYSTOLIC BLOOD PRESSURE: 110 MMHG | HEART RATE: 72 BPM | HEIGHT: 62 IN | DIASTOLIC BLOOD PRESSURE: 61 MMHG

## 2023-08-21 LAB
PARTIAL THROMBOPLASTIN TIME: 59.4 SEC (ref 24.5–35.1)
PARTIAL THROMBOPLASTIN TIME: 95.2 SEC (ref 24.5–35.1)

## 2023-08-21 PROCEDURE — 6370000000 HC RX 637 (ALT 250 FOR IP): Performed by: INTERNAL MEDICINE

## 2023-08-21 PROCEDURE — 2580000003 HC RX 258: Performed by: INTERNAL MEDICINE

## 2023-08-21 PROCEDURE — 85730 THROMBOPLASTIN TIME PARTIAL: CPT

## 2023-08-21 PROCEDURE — 36415 COLL VENOUS BLD VENIPUNCTURE: CPT

## 2023-08-21 RX ADMIN — SODIUM CHLORIDE, PRESERVATIVE FREE 10 ML: 5 INJECTION INTRAVENOUS at 08:01

## 2023-08-21 RX ADMIN — SODIUM CHLORIDE 950 ML: 9 INJECTION, SOLUTION INTRAVENOUS at 01:48

## 2023-08-21 RX ADMIN — APIXABAN 10 MG: 5 TABLET, FILM COATED ORAL at 09:21

## 2023-08-21 NOTE — PLAN OF CARE
Problem: Discharge Planning  Goal: Discharge to home or other facility with appropriate resources  8/20/2023 2324 by Yoandy Machado RN  Outcome: Progressing  8/20/2023 1801 by Brandt Chopra RN  Outcome: Progressing     Problem: Nutrition Deficit:  Goal: Optimize nutritional status  8/20/2023 2324 by Yoandy Machado RN  Outcome: Progressing  8/20/2023 1801 by Brandt Chopra RN  Outcome: Progressing     Problem: Pain  Goal: Verbalizes/displays adequate comfort level or baseline comfort level  8/20/2023 2324 by Yoandy Machado RN  Outcome: Progressing  8/20/2023 1801 by Brandt Chopra RN  Outcome: Progressing

## 2023-08-21 NOTE — ACP (ADVANCE CARE PLANNING)
Advance Care Planning   Healthcare Decision Maker:    Primary Decision Maker (Active): Raj Debnwz - [phone]    Click here to complete Healthcare Decision Makers including selection of the Healthcare Decision Maker Relationship (ie \"Primary\").

## 2023-08-21 NOTE — PROGRESS NOTES
[facility] and [facility] Dr. Darcie Osler      Patient Name: Terry Hidalgo  YOB: 1955  PCP: Meagan Becerril DO   Referring Provider:      Reason for Consultation:   Chief Complaint   Patient presents with    Abdominal Pain     Pain starting in the middle of the night, indigestion, heart burn, denies emesis      Subjective: Feels well today. No acute complaints    History of Present Illness: This pt is a pleasant 78 yo female who presented to the ER with epigastric abdominal pain/discomfort. She had a CTA of the abdomen with was consistent with a filling defect in the PV with extension into the SMV, consistent with PVT. She was admitted and placed on heparin gtt. She was seen by [facility]?, there was no overt evidence of mass or neoplastic process on aforementioned imaging, however MRI has been ordered to r/o neoplasm. Vascular following.  Hematology consulted for [facility] management recommendations and thrombophilia work up    Diagnostic Data:     Past Medical History:   Diagnosis Date    Abnormal Pap smear of cervix     Anxiety     Detached retina, right 08/17/2010,10/29/10    surgery, Dr. Federico Cordero [facility]        Patient Active Problem List    Diagnosis Date Noted    Intractable abdominal pain 08/20/2023    Portal vein thrombosis 08/18/2023    Anxiety 08/17/2023    Vertigo 08/07/2023    MDD (major depressive disorder) 12/06/2012    Retinal detachment with retinal defect 11/29/2011        Past Surgical History:   Procedure Laterality Date    CATARACT REMOVAL  07/17/11    also had scar tissue removed at this time from prev retinal surgeries    LEEP      TONSILLECTOMY AND ADENOIDECTOMY         Family History  Family History   Problem Relation Age of Onset    High Cholesterol Mother     Cancer Maternal Uncle         colon    Diabetes Maternal Grandmother     High Blood Pressure Maternal Grandmother     Cancer Maternal Uncle         colon       Social History    TOBACCO:   reports that follow      Electronically signed by SCOTTY To CNP on 8/21/2023 at 1:41 PM

## 2023-08-21 NOTE — CARE COORDINATION
Chart reviewed and case reviewed in IDR. Met with the patient at the bedside to discuss transition of care. Patient live with her  Soy Ugalde in a one stpry home with a two step entry with no railing. Patient has no DME and no history of rehab. Patient's PCP is Dr Monet Montana and she uses [facility] on [address] [place] [address],[facility] [address] for her medications. Patient stated her  will transport when she is medically stable to discharge. Patient transitioned to Eliquis today from Heparin gtt. MRI negative for malignancy. Surgery signed off. Transition of care plan is to home. Will continue to follow. Lilia Valera RN.  [place]:  [phone]      Case Management Assessment  Initial Evaluation    Date/Time of Evaluation: 8/21/2023 1:47 PM  Assessment Completed by: Lilia Valera RN    If patient is discharged prior to next notation, then this note serves as note for discharge by case management. Patient Name: Ramya Colbert                   YOB: 1955  Diagnosis: Portal vein thrombosis [I81]  Abdominal pain, unspecified abdominal location [R10.9]  Intractable abdominal pain [R10.9]                   Date / Time: 8/18/2023  5:51 PM    Patient Admission Status: Inpatient   Readmission Risk (Low < 19, Mod (19-27), High > 27): Readmission Risk Score: 16    Current PCP: Lalita Sinha DO  PCP verified by CM? Yes (Aj Padilla DO)    Chart Reviewed: Yes      History Provided by: Patient  Patient Orientation: Alert and Oriented    Patient Cognition: Alert    Hospitalization in the last 30 days (Readmission):  Yes    If yes, Readmission Assessment in CM Navigator will be completed.     Advance Directives:      Code Status: Full Code   Patient's Primary Decision Maker is: Legal Next of Kin    Primary Decision Maker (Active): Jen Race - [phone]    Discharge Planning:    Patient lives with: Spouse/Significant Other Type of Home: House  Primary Care Giver: Self  Patient Support Systems

## 2023-08-21 NOTE — CARE COORDINATION
Readmission Assessment  Number of Days since last admission?: (P) 1-7 days  Previous Disposition: (P) Home with Family  Who is being Interviewed: (P) Patient  What was the patient's/caregiver's perception as to why they think they needed to return back to the hospital?: (P) Other (Comment) (Dizziness worsened, returned downtown)  Did you visit your Primary Care Physician after you left the hospital, before you returned this time?: (P) No  Why weren't you able to visit your PCP?: (P) Did not have an appointment  Did you see a specialist, such as Cardiac, Pulmonary, Orthopedic Physician, etc. after you left the hospital?: (P) No  Who advised the patient to return to the hospital?: (P) Self-referral  Does the patient report anything that got in the way of taking their medications?: (P) No  In our efforts to provide the best possible care to you and others like you, can you think of anything that we could have done to help you after you left the hospital the first time, so that you might not have needed to return so soon?: (P) Teaching during hospitalization regarding your illness, Improved written discharge instructions, Discharge instructions that are concise, clear, and non contradictory, Education on how to continue taking medications upon discharge

## 2023-08-22 ENCOUNTER — CARE COORDINATION (OUTPATIENT)
Dept: CASE MANAGEMENT | Age: 68
End: 2023-08-22

## 2023-08-22 ENCOUNTER — OFFICE VISIT (OUTPATIENT)
Dept: FAMILY MEDICINE CLINIC | Age: 68
End: 2023-08-22

## 2023-08-22 VITALS
DIASTOLIC BLOOD PRESSURE: 66 MMHG | SYSTOLIC BLOOD PRESSURE: 100 MMHG | TEMPERATURE: 97.3 F | BODY MASS INDEX: 20.48 KG/M2 | HEIGHT: 62 IN | HEART RATE: 81 BPM | WEIGHT: 111.3 LBS | OXYGEN SATURATION: 97 % | RESPIRATION RATE: 18 BRPM

## 2023-08-22 DIAGNOSIS — R11.0 NAUSEA: ICD-10-CM

## 2023-08-22 DIAGNOSIS — I81 PORTAL VEIN THROMBOSIS: ICD-10-CM

## 2023-08-22 DIAGNOSIS — F32.1 CURRENT MODERATE EPISODE OF MAJOR DEPRESSIVE DISORDER, UNSPECIFIED WHETHER RECURRENT (HCC): Chronic | ICD-10-CM

## 2023-08-22 DIAGNOSIS — F41.9 ANXIETY: Primary | ICD-10-CM

## 2023-08-22 LAB
SEND OUT REPORT: NORMAL
TEST NAME: NORMAL

## 2023-08-22 RX ORDER — METOCLOPRAMIDE 5 MG/1
5 TABLET ORAL 3 TIMES DAILY
Qty: 90 TABLET | Refills: 1 | Status: ON HOLD | OUTPATIENT
Start: 2023-08-22

## 2023-08-22 ASSESSMENT — PATIENT HEALTH QUESTIONNAIRE - PHQ9
4. FEELING TIRED OR HAVING LITTLE ENERGY: 2
5. POOR APPETITE OR OVEREATING: 2
SUM OF ALL RESPONSES TO PHQ QUESTIONS 1-9: 13
9. THOUGHTS THAT YOU WOULD BE BETTER OFF DEAD, OR OF HURTING YOURSELF: 0
SUM OF ALL RESPONSES TO PHQ QUESTIONS 1-9: 13
6. FEELING BAD ABOUT YOURSELF - OR THAT YOU ARE A FAILURE OR HAVE LET YOURSELF OR YOUR FAMILY DOWN: 0
8. MOVING OR SPEAKING SO SLOWLY THAT OTHER PEOPLE COULD HAVE NOTICED. OR THE OPPOSITE, BEING SO FIGETY OR RESTLESS THAT YOU HAVE BEEN MOVING AROUND A LOT MORE THAN USUAL: 0
2. FEELING DOWN, DEPRESSED OR HOPELESS: 2
SUM OF ALL RESPONSES TO PHQ QUESTIONS 1-9: 13
7. TROUBLE CONCENTRATING ON THINGS, SUCH AS READING THE NEWSPAPER OR WATCHING TELEVISION: 2
SUM OF ALL RESPONSES TO PHQ9 QUESTIONS 1 & 2: 4
3. TROUBLE FALLING OR STAYING ASLEEP: 3
10. IF YOU CHECKED OFF ANY PROBLEMS, HOW DIFFICULT HAVE THESE PROBLEMS MADE IT FOR YOU TO DO YOUR WORK, TAKE CARE OF THINGS AT HOME, OR GET ALONG WITH OTHER PEOPLE: 2
1. LITTLE INTEREST OR PLEASURE IN DOING THINGS: 2
SUM OF ALL RESPONSES TO PHQ QUESTIONS 1-9: 13

## 2023-08-22 NOTE — CARE COORDINATION
Care Transitions Outreach Attempt    Call within 2 business days of discharge: Yes     Attempted to reach the patient for initial Care Transition call post hospital discharge. HIPAA compliant message left with CTN's contact information requesting return phone call. Will attempt outreach again. Noted pt has a f/u appt with her pcp today. CTN will route to office staff requesting they update OV type to be a HFU OV type. Addendum: 23 @0697  -Pt attempted to return CTN's call and LVM. -CTN attempted to return call, however, no answer. LVM. -Will reattempt outreach tomorrow. Patient: Lennie Will Patient : 1955 MRN: [id number]    Last Discharge [address]       Date Complaint Diagnosis Description Type Department Provider    23 Abdominal Pain Portal vein thrombosis . .. ED to Hosp-Admission (Discharged) (ADMITTED) [facility] [address] [facility]; Stephany Cedeno .. Was this an external facility discharge?  No    Noted following upcoming appointments from discharge chart review:   [facility] follow up appointment(s):   Future Appointments   Date Time Provider [address]   2023  1:15 PM Junior Padilla DO [facility]   2023  1:30 PM JAYSHREE Cummings [facility]   11/15/2023 10:30 AM Carrie Padilla DO [facility]     Non-[facility] follow up appointment(s):

## 2023-08-23 ENCOUNTER — TELEPHONE (OUTPATIENT)
Dept: FAMILY MEDICINE CLINIC | Age: 68
End: 2023-08-23

## 2023-08-23 ENCOUNTER — CARE COORDINATION (OUTPATIENT)
Dept: CASE MANAGEMENT | Age: 68
End: 2023-08-23

## 2023-08-23 ENCOUNTER — APPOINTMENT (OUTPATIENT)
Dept: CT IMAGING | Age: 68
DRG: 392 | End: 2023-08-23
Payer: MEDICARE

## 2023-08-23 ENCOUNTER — HOSPITAL ENCOUNTER (INPATIENT)
Age: 68
LOS: 6 days | Discharge: HOME | DRG: 392 | End: 2023-08-30
Attending: EMERGENCY MEDICINE | Admitting: INTERNAL MEDICINE
Payer: MEDICARE

## 2023-08-23 DIAGNOSIS — I81 PORTAL VEIN THROMBOSIS: Primary | ICD-10-CM

## 2023-08-23 DIAGNOSIS — R10.12 LEFT UPPER QUADRANT ABDOMINAL PAIN: Primary | ICD-10-CM

## 2023-08-23 DIAGNOSIS — N30.90 CYSTITIS WITHOUT HEMATURIA: ICD-10-CM

## 2023-08-23 DIAGNOSIS — R26.2 UNABLE TO AMBULATE: ICD-10-CM

## 2023-08-23 DIAGNOSIS — K29.00 ACUTE SUPERFICIAL GASTRITIS WITHOUT HEMORRHAGE: ICD-10-CM

## 2023-08-23 DIAGNOSIS — R42 DIZZINESS: ICD-10-CM

## 2023-08-23 LAB
ALBUMIN SERPL-MCNC: 4.4 G/DL (ref 3.5–5.2)
ALP SERPL-CCNC: 69 U/L (ref 35–104)
ALT SERPL-CCNC: 54 U/L (ref 0–32)
ANION GAP SERPL CALCULATED.3IONS-SCNC: 11 MMOL/L (ref 7–16)
AST SERPL-CCNC: 48 U/L (ref 0–31)
BACTERIA URNS QL MICRO: ABNORMAL
BASOPHILS # BLD: 0.04 K/UL (ref 0–0.2)
BASOPHILS NFR BLD: 1 % (ref 0–2)
BILIRUB SERPL-MCNC: 0.4 MG/DL (ref 0–1.2)
BILIRUB UR QL STRIP: NEGATIVE
BUN SERPL-MCNC: 16 MG/DL (ref 6–23)
CALCIUM SERPL-MCNC: 9.5 MG/DL (ref 8.6–10.2)
CHLORIDE SERPL-SCNC: 103 MMOL/L (ref 98–107)
CLARITY UR: CLEAR
CO2 SERPL-SCNC: 25 MMOL/L (ref 22–29)
COLOR UR: YELLOW
CREAT SERPL-MCNC: 0.8 MG/DL (ref 0.5–1)
EOSINOPHIL # BLD: 0.04 K/UL (ref 0.05–0.5)
EOSINOPHILS RELATIVE PERCENT: 1 % (ref 0–6)
EPI CELLS #/AREA URNS HPF: ABNORMAL /HPF
ERYTHROCYTE [DISTWIDTH] IN BLOOD BY AUTOMATED COUNT: 13.2 % (ref 11.5–15)
GFR SERPL CREATININE-BSD FRML MDRD: >60 ML/MIN/1.73M2
GLUCOSE SERPL-MCNC: 100 MG/DL (ref 74–99)
GLUCOSE UR STRIP-MCNC: NEGATIVE MG/DL
HCT VFR BLD AUTO: 38.8 % (ref 34–48)
HGB BLD-MCNC: 13 G/DL (ref 11.5–15.5)
HGB UR QL STRIP.AUTO: ABNORMAL
IMM GRANULOCYTES # BLD AUTO: <0.03 K/UL (ref 0–0.58)
IMM GRANULOCYTES NFR BLD: 0 % (ref 0–5)
KETONES UR STRIP-MCNC: 15 MG/DL
LACTATE BLDV-SCNC: 1.1 MMOL/L (ref 0.5–2.2)
LEUKOCYTE ESTERASE UR QL STRIP: ABNORMAL
LIPASE SERPL-CCNC: 23 U/L (ref 13–60)
LYMPHOCYTES NFR BLD: 1.16 K/UL (ref 1.5–4)
LYMPHOCYTES RELATIVE PERCENT: 16 % (ref 20–42)
MCH RBC QN AUTO: 30.1 PG (ref 26–35)
MCHC RBC AUTO-ENTMCNC: 33.5 G/DL (ref 32–34.5)
MCV RBC AUTO: 89.8 FL (ref 80–99.9)
MONOCYTES NFR BLD: 0.67 K/UL (ref 0.1–0.95)
MONOCYTES NFR BLD: 9 % (ref 2–12)
NEUTROPHILS NFR BLD: 74 % (ref 43–80)
NEUTS SEG NFR BLD: 5.41 K/UL (ref 1.8–7.3)
NITRITE UR QL STRIP: NEGATIVE
PH UR STRIP: 5.5 [PH] (ref 5–9)
PLATELET # BLD AUTO: 294 K/UL (ref 130–450)
PMV BLD AUTO: 9.9 FL (ref 7–12)
POTASSIUM SERPL-SCNC: 4 MMOL/L (ref 3.5–5)
PROT SERPL-MCNC: 6.6 G/DL (ref 6.4–8.3)
PROT UR STRIP-MCNC: NEGATIVE MG/DL
RBC # BLD AUTO: 4.32 M/UL (ref 3.5–5.5)
RBC #/AREA URNS HPF: ABNORMAL /HPF
SODIUM SERPL-SCNC: 139 MMOL/L (ref 132–146)
SP GR UR STRIP: 1.02 (ref 1–1.03)
TROPONIN I SERPL HS-MCNC: 7 NG/L (ref 0–9)
UROBILINOGEN UR STRIP-ACNC: 0.2 EU/DL (ref 0–1)
WBC #/AREA URNS HPF: ABNORMAL /HPF
WBC OTHER # BLD: 7.3 K/UL (ref 4.5–11.5)

## 2023-08-23 PROCEDURE — 83605 ASSAY OF LACTIC ACID: CPT

## 2023-08-23 PROCEDURE — 1111F DSCHRG MED/CURRENT MED MERGE: CPT | Performed by: FAMILY MEDICINE

## 2023-08-23 PROCEDURE — 83690 ASSAY OF LIPASE: CPT

## 2023-08-23 PROCEDURE — 6370000000 HC RX 637 (ALT 250 FOR IP): Performed by: EMERGENCY MEDICINE

## 2023-08-23 PROCEDURE — 84484 ASSAY OF TROPONIN QUANT: CPT

## 2023-08-23 PROCEDURE — 96374 THER/PROPH/DIAG INJ IV PUSH: CPT

## 2023-08-23 PROCEDURE — 6360000004 HC RX CONTRAST MEDICATION: Performed by: RADIOLOGY

## 2023-08-23 PROCEDURE — 85025 COMPLETE CBC W/AUTO DIFF WBC: CPT

## 2023-08-23 PROCEDURE — 80053 COMPREHEN METABOLIC PANEL: CPT

## 2023-08-23 PROCEDURE — 99285 EMERGENCY DEPT VISIT HI MDM: CPT

## 2023-08-23 PROCEDURE — 2580000003 HC RX 258: Performed by: EMERGENCY MEDICINE

## 2023-08-23 PROCEDURE — 6360000002 HC RX W HCPCS: Performed by: EMERGENCY MEDICINE

## 2023-08-23 PROCEDURE — 70450 CT HEAD/BRAIN W/O DYE: CPT

## 2023-08-23 PROCEDURE — 81001 URINALYSIS AUTO W/SCOPE: CPT

## 2023-08-23 PROCEDURE — 2500000003 HC RX 250 WO HCPCS: Performed by: EMERGENCY MEDICINE

## 2023-08-23 PROCEDURE — 93005 ELECTROCARDIOGRAM TRACING: CPT | Performed by: EMERGENCY MEDICINE

## 2023-08-23 PROCEDURE — 74177 CT ABD & PELVIS W/CONTRAST: CPT

## 2023-08-23 PROCEDURE — 96375 TX/PRO/DX INJ NEW DRUG ADDON: CPT

## 2023-08-23 RX ORDER — MECLIZINE HCL 12.5 MG/1
25 TABLET ORAL ONCE
Status: COMPLETED | OUTPATIENT
Start: 2023-08-23 | End: 2023-08-23

## 2023-08-23 RX ORDER — HYDROMORPHONE HYDROCHLORIDE 1 MG/ML
0.5 INJECTION, SOLUTION INTRAMUSCULAR; INTRAVENOUS; SUBCUTANEOUS ONCE
Status: COMPLETED | OUTPATIENT
Start: 2023-08-23 | End: 2023-08-23

## 2023-08-23 RX ORDER — ONDANSETRON 2 MG/ML
4 INJECTION INTRAMUSCULAR; INTRAVENOUS ONCE
Status: COMPLETED | OUTPATIENT
Start: 2023-08-23 | End: 2023-08-23

## 2023-08-23 RX ORDER — 0.9 % SODIUM CHLORIDE 0.9 %
1000 INTRAVENOUS SOLUTION INTRAVENOUS ONCE
Status: COMPLETED | OUTPATIENT
Start: 2023-08-23 | End: 2023-08-23

## 2023-08-23 RX ADMIN — WATER 1000 MG: 1 INJECTION INTRAMUSCULAR; INTRAVENOUS; SUBCUTANEOUS at 21:39

## 2023-08-23 RX ADMIN — ONDANSETRON 4 MG: 2 INJECTION INTRAMUSCULAR; INTRAVENOUS at 19:34

## 2023-08-23 RX ADMIN — SODIUM CHLORIDE 1000 ML: 9 INJECTION, SOLUTION INTRAVENOUS at 19:31

## 2023-08-23 RX ADMIN — HYDROMORPHONE HYDROCHLORIDE 0.5 MG: 1 INJECTION, SOLUTION INTRAMUSCULAR; INTRAVENOUS; SUBCUTANEOUS at 19:34

## 2023-08-23 RX ADMIN — Medication: at 19:43

## 2023-08-23 RX ADMIN — MECLIZINE 25 MG: 12.5 TABLET ORAL at 22:19

## 2023-08-23 RX ADMIN — IOPAMIDOL 75 ML: 755 INJECTION, SOLUTION INTRAVENOUS at 19:14

## 2023-08-23 ASSESSMENT — PAIN DESCRIPTION - PAIN TYPE: TYPE: ACUTE PAIN

## 2023-08-23 ASSESSMENT — PAIN DESCRIPTION - FREQUENCY: FREQUENCY: CONTINUOUS

## 2023-08-23 ASSESSMENT — PAIN - FUNCTIONAL ASSESSMENT: PAIN_FUNCTIONAL_ASSESSMENT: 0-10

## 2023-08-23 ASSESSMENT — PAIN DESCRIPTION - ORIENTATION: ORIENTATION: LEFT;UPPER

## 2023-08-23 ASSESSMENT — LIFESTYLE VARIABLES
HOW MANY STANDARD DRINKS CONTAINING ALCOHOL DO YOU HAVE ON A TYPICAL DAY: PATIENT DOES NOT DRINK
HOW OFTEN DO YOU HAVE A DRINK CONTAINING ALCOHOL: NEVER

## 2023-08-23 ASSESSMENT — PAIN SCALES - GENERAL: PAINLEVEL_OUTOF10: 10

## 2023-08-23 ASSESSMENT — PAIN DESCRIPTION - LOCATION: LOCATION: ABDOMEN

## 2023-08-23 ASSESSMENT — PAIN DESCRIPTION - ONSET: ONSET: ON-GOING

## 2023-08-23 NOTE — ED PROVIDER NOTES
without rash  Neurologic: GCS 15, cranial nerves II through XII intact no focal deficits. No meningeal signs  Psych: Normal Affect    ------------------------------ ED COURSE/MEDICAL DECISION MAKING----------------------  Medications   iopamidol (ISOVUE-370) 76 % injection 75 mL (75 mLs IntraVENous Given 8/23/23 1914)   sodium chloride 0.9 % bolus 1,000 mL (0 mLs IntraVENous Stopped 8/23/23 2028)   aluminum & magnesium hydroxide-simethicone (MAALOX) 30 mL, lidocaine viscous hcl (XYLOCAINE) 5 mL (GI COCKTAIL) ( Oral Given 8/23/23 1943)   ondansetron (ZOFRAN) injection 4 mg (4 mg IntraVENous Given 8/23/23 1934)   HYDROmorphone HCl PF (DILAUDID) injection 0.5 mg (0.5 mg IntraVENous Given 8/23/23 1934)   cefTRIAXone (ROCEPHIN) 1,000 mg in sterile water 10 mL IV syringe (1,000 mg IntraVENous Given 8/23/23 2139)   meclizine (ANTIVERT) tablet 25 mg (25 mg Oral Given 8/23/23 2219)   diphenhydrAMINE (BENADRYL) injection 50 mg (50 mg IntraVENous Given 8/24/23 0034)   metoclopramide (REGLAN) injection 5 mg (5 mg IntraVENous Given 8/24/23 0034)       ED COURSE:  EKG #1:  Interpreted by emergency department attending physician unless otherwise noted. 8/23/23  Time: 22:24  Rhythm: normal sinus   Rate: normal  Axis: normal  Conduction: normal  ST Segments: normal  T Waves: non specific changes  Clinical Impression: NSR, Normal ecg, non-specific EKG  Comparison to Prior tracings: There are no significant changes when compared to prior tracings. Medical Decision Making:   Differential diagnoses for the patient's abdominal pain: IBS, gastritis, GERD, diverticulitis, mesenteric adenitis, UTI/cystitis, ureteral calculus, to name a few. Patient was in severe pain on initial assessment for this reason she did receive Dilaudid 0.5 mg IV. Her urinalysis does show evidence of UTI for which she did receive Rocephin 1 g IV and the patient did also receive a GI cocktail as a component of her symptoms is felt to be gastritis.   On

## 2023-08-23 NOTE — TELEPHONE ENCOUNTER
Patients  called reporting Rita Hoang was having stomach pain and asking what to do. I asked him if she had taken any of the meds that were prescribed. He then asked wife and she replied in the background that she has tried everything she has and nothing is working. I asked if she had a recent bowel movement and Rita Hoang said yes. I informed him I would send a message but I was not sure when we would get back to them. If worsened she should go to ER. Please advise.

## 2023-08-23 NOTE — CARE COORDINATION
[address] [facility] 250 Care Transitions Initial Follow Up Call    Call within 2 business days of discharge: Yes    Patient Current Location:  Home: [address] [place] [address]    Care Transition Nurse contacted the patient by telephone to perform post hospital discharge assessment. Provided introduction to self, and explanation of the Care Transition Nurse role. Patient: Tatiana Gallagher Patient : 1955   MRN: [id number]  Reason for Admission: Portal vein thrombosis   Discharge Date: 23 RARS: Readmission Risk Score: 15      Last Discharge [address] [facility] Floor       Date Complaint Diagnosis Description Type Department Provider    23 Abdominal Pain Portal vein thrombosis . .. ED to Hosp-Admission (Discharged) (ADMITTED) [facility] [address] [facility], DO; Jameel RAYA Sp. .. Was this an external facility discharge? No     Challenges to be reviewed by the provider   Additional needs identified to be addressed with provider: No  none               Method of communication with provider: none. CTN called and spoke with the pt for an initial care transition call post hospital discharge. Pt admitted on 23 with dx portal vein and superior mesenteric vein thrombus and had been seen by hematology, hepatobiliary surgery and vascular surgery with surgical interventions recommended. Pt started on Eliquis at discharge. Pt presented to ED initially with c/o RUQ abd pain. Pt states that her biggest complaint is having midepigastric \"stomach burning. \" She states that nothing makes it better or worse. She states that she does not find that certain foods make it worse. She states that the Prilosec makes it worse so she is not taking it. She did f/u with her pcp yesterday for a HFU appt and was provided with a new rx for Reglan which she states that she did fill. Pt denies any nausea/vomiting and reports that she has been able to keep solids/fluids down and that she is eating small frequent meals.     Pt recently admitted

## 2023-08-23 NOTE — ED NOTES
Department of Emergency Medicine  FIRST PROVIDER TRIAGE NOTE             Independent MLP           8/23/23  4:10 PM EDT    Date of Encounter: 8/23/23   MRN: [id number]      HPI: Myron Roberts is a 79 y.o. female who presents to the ED for Abdominal Pain (LUQ pain, ongoing issue)  Patient has been seen on 8/14/2023, 8/16/2023 was admitted on 8/16/2023, admitted again on 8/18/2023 for similar issues. Was recently diagnosed with a portal vein thrombus    ROS: Negative for fever, vomiting, diarrhea, or urinary complaints. PE: Gen Appearance/Constitutional: alert  HEENT: NC/NT. PERRLA,  Airway patent. Neck: supple  CV: regular rate  Pulm: CTA bilat  GI: soft and NT  Musculoskeletal: moves all extremities x 4  Lymphatics: no edema     Initial Plan of Care: All treatment areas with department are currently occupied. Plan to order/Initiate the following while awaiting opening in ED: labs, EKG, and imaging studies.   Initiate Treatment-Testing, Proceed toTreatment Area When Bed Available for ED Attending/MLP to Continue Care    Electronically signed by SCOTTY Armstrong CNP   DD: 8/23/23       SCOTTY Armstrong CNP  08/28/23 0134

## 2023-08-24 PROBLEM — R10.12 LEFT UPPER QUADRANT ABDOMINAL PAIN: Status: ACTIVE | Noted: 2023-08-24

## 2023-08-24 PROBLEM — R26.2 UNABLE TO AMBULATE: Status: ACTIVE | Noted: 2023-08-24

## 2023-08-24 LAB
EKG ATRIAL RATE: 76 BPM
EKG P AXIS: 76 DEGREES
EKG P-R INTERVAL: 130 MS
EKG Q-T INTERVAL: 386 MS
EKG QRS DURATION: 66 MS
EKG QTC CALCULATION (BAZETT): 434 MS
EKG R AXIS: 76 DEGREES
EKG T AXIS: 42 DEGREES
EKG VENTRICULAR RATE: 76 BPM

## 2023-08-24 PROCEDURE — 99222 1ST HOSP IP/OBS MODERATE 55: CPT | Performed by: TRANSPLANT SURGERY

## 2023-08-24 PROCEDURE — 6370000000 HC RX 637 (ALT 250 FOR IP): Performed by: INTERNAL MEDICINE

## 2023-08-24 PROCEDURE — 1200000000 HC SEMI PRIVATE

## 2023-08-24 PROCEDURE — 93010 ELECTROCARDIOGRAM REPORT: CPT | Performed by: INTERNAL MEDICINE

## 2023-08-24 PROCEDURE — 97530 THERAPEUTIC ACTIVITIES: CPT

## 2023-08-24 PROCEDURE — 99222 1ST HOSP IP/OBS MODERATE 55: CPT | Performed by: CLINICAL NURSE SPECIALIST

## 2023-08-24 PROCEDURE — 87086 URINE CULTURE/COLONY COUNT: CPT

## 2023-08-24 PROCEDURE — 2580000003 HC RX 258: Performed by: INTERNAL MEDICINE

## 2023-08-24 PROCEDURE — 97165 OT EVAL LOW COMPLEX 30 MIN: CPT

## 2023-08-24 PROCEDURE — 6360000002 HC RX W HCPCS: Performed by: INTERNAL MEDICINE

## 2023-08-24 PROCEDURE — 97161 PT EVAL LOW COMPLEX 20 MIN: CPT

## 2023-08-24 PROCEDURE — 6360000002 HC RX W HCPCS: Performed by: EMERGENCY MEDICINE

## 2023-08-24 RX ORDER — PANTOPRAZOLE SODIUM 40 MG/1
40 TABLET, DELAYED RELEASE ORAL
Status: DISCONTINUED | OUTPATIENT
Start: 2023-08-24 | End: 2023-08-25

## 2023-08-24 RX ORDER — ACETAMINOPHEN 650 MG/1
650 SUPPOSITORY RECTAL EVERY 6 HOURS PRN
Status: DISCONTINUED | OUTPATIENT
Start: 2023-08-24 | End: 2023-08-30 | Stop reason: HOSPADM

## 2023-08-24 RX ORDER — ACETAMINOPHEN 325 MG/1
650 TABLET ORAL EVERY 6 HOURS PRN
Status: DISCONTINUED | OUTPATIENT
Start: 2023-08-24 | End: 2023-08-30 | Stop reason: HOSPADM

## 2023-08-24 RX ORDER — ONDANSETRON 4 MG/1
4 TABLET, ORALLY DISINTEGRATING ORAL EVERY 8 HOURS PRN
Status: DISCONTINUED | OUTPATIENT
Start: 2023-08-24 | End: 2023-08-30 | Stop reason: HOSPADM

## 2023-08-24 RX ORDER — SODIUM CHLORIDE 9 MG/ML
INJECTION, SOLUTION INTRAVENOUS PRN
Status: DISCONTINUED | OUTPATIENT
Start: 2023-08-24 | End: 2023-08-30 | Stop reason: HOSPADM

## 2023-08-24 RX ORDER — DICYCLOMINE HYDROCHLORIDE 10 MG/1
10 CAPSULE ORAL 3 TIMES DAILY PRN
Status: DISCONTINUED | OUTPATIENT
Start: 2023-08-24 | End: 2023-08-24

## 2023-08-24 RX ORDER — ONDANSETRON 2 MG/ML
4 INJECTION INTRAMUSCULAR; INTRAVENOUS EVERY 6 HOURS PRN
Status: DISCONTINUED | OUTPATIENT
Start: 2023-08-24 | End: 2023-08-30 | Stop reason: HOSPADM

## 2023-08-24 RX ORDER — POLYETHYLENE GLYCOL 3350 17 G/17G
17 POWDER, FOR SOLUTION ORAL DAILY PRN
Status: DISCONTINUED | OUTPATIENT
Start: 2023-08-24 | End: 2023-08-30 | Stop reason: HOSPADM

## 2023-08-24 RX ORDER — SODIUM CHLORIDE 9 MG/ML
INJECTION, SOLUTION INTRAVENOUS CONTINUOUS
Status: DISCONTINUED | OUTPATIENT
Start: 2023-08-24 | End: 2023-08-30 | Stop reason: HOSPADM

## 2023-08-24 RX ORDER — DIPHENHYDRAMINE HYDROCHLORIDE 50 MG/ML
50 INJECTION INTRAMUSCULAR; INTRAVENOUS ONCE
Status: COMPLETED | OUTPATIENT
Start: 2023-08-24 | End: 2023-08-24

## 2023-08-24 RX ORDER — SODIUM CHLORIDE 0.9 % (FLUSH) 0.9 %
5-40 SYRINGE (ML) INJECTION PRN
Status: DISCONTINUED | OUTPATIENT
Start: 2023-08-24 | End: 2023-08-30 | Stop reason: HOSPADM

## 2023-08-24 RX ORDER — METOCLOPRAMIDE HYDROCHLORIDE 5 MG/ML
5 INJECTION INTRAMUSCULAR; INTRAVENOUS ONCE
Status: COMPLETED | OUTPATIENT
Start: 2023-08-24 | End: 2023-08-24

## 2023-08-24 RX ORDER — SODIUM CHLORIDE 0.9 % (FLUSH) 0.9 %
5-40 SYRINGE (ML) INJECTION EVERY 12 HOURS SCHEDULED
Status: DISCONTINUED | OUTPATIENT
Start: 2023-08-24 | End: 2023-08-30 | Stop reason: HOSPADM

## 2023-08-24 RX ADMIN — WATER 1000 MG: 1 INJECTION INTRAMUSCULAR; INTRAVENOUS; SUBCUTANEOUS at 21:53

## 2023-08-24 RX ADMIN — SODIUM CHLORIDE: 9 INJECTION, SOLUTION INTRAVENOUS at 03:53

## 2023-08-24 RX ADMIN — SODIUM CHLORIDE, PRESERVATIVE FREE 10 ML: 5 INJECTION INTRAVENOUS at 09:06

## 2023-08-24 RX ADMIN — SODIUM CHLORIDE: 9 INJECTION, SOLUTION INTRAVENOUS at 19:38

## 2023-08-24 RX ADMIN — APIXABAN 10 MG: 5 TABLET, FILM COATED ORAL at 20:40

## 2023-08-24 RX ADMIN — METOCLOPRAMIDE 5 MG: 5 INJECTION, SOLUTION INTRAMUSCULAR; INTRAVENOUS at 00:34

## 2023-08-24 RX ADMIN — APIXABAN 10 MG: 5 TABLET, FILM COATED ORAL at 10:36

## 2023-08-24 RX ADMIN — DIPHENHYDRAMINE HYDROCHLORIDE 50 MG: 50 INJECTION INTRAMUSCULAR; INTRAVENOUS at 00:34

## 2023-08-24 RX ADMIN — SODIUM CHLORIDE, PRESERVATIVE FREE 10 ML: 5 INJECTION INTRAVENOUS at 21:54

## 2023-08-24 ASSESSMENT — PAIN DESCRIPTION - ORIENTATION: ORIENTATION: LEFT;LOWER

## 2023-08-24 ASSESSMENT — ENCOUNTER SYMPTOMS
VOMITING: 1
NAUSEA: 1
ABDOMINAL PAIN: 1

## 2023-08-24 ASSESSMENT — PAIN DESCRIPTION - LOCATION
LOCATION: ABDOMEN
LOCATION: ABDOMEN

## 2023-08-24 ASSESSMENT — PAIN SCALES - GENERAL
PAINLEVEL_OUTOF10: 5
PAINLEVEL_OUTOF10: 8

## 2023-08-24 ASSESSMENT — PAIN DESCRIPTION - DESCRIPTORS: DESCRIPTORS: ACHING;DISCOMFORT;DULL

## 2023-08-24 NOTE — CONSULTS
Hepatobiliary and Pancreatic Surgery Attending History and Physical    Patient's Name/Date of Birth: Markell Rodriguez /1955 (78 y.o.)    Date: August 24, 2023     Consultation for: Dr Laverne Thorpe  Reason for consultation: Intractable abdominal pain  Consultation from: Dr Heath Eye    CC: Abdominal pain. Recently prescribed zofran for vertigo. HPI:  Markell Rodriguez is a 79 y.o. female with PMHx of vertigo, portal vein thrombus, diverticulosis, anxiety and detached retina on the right who presented to ED 8/16/23 with left sided abdominal pain. She was recently admitted with vertigo on 8/4/23, she had negative CT and MRI. She was discharged on Zofran stating she took it the day prior to admission, then the next day noted burning and acid reflux taste in her mouth associated with nausea. States since last discharge she has had left sided abdominal pain attributing it to the zofran. Describes pain as severe, 10/10. She presented to ED 8/14/23 for similar, she was diagnosed with acute cystitis, LUQ abd pain, and lesion of ovary. She was discharged home with Cefdinir, Dicyclomine, and Omeprazole, and to follow up with PCP and GYN. She was admitted 8/16/23 via Ed with intractable abd pain and ketonuria from [facility] ED. CT A/P 8/18/23 showed No abdominal aortic aneurysm or dissection and no hemodynamically significant stenosis involving the large abdominal arteries. No definite CT evidence of bowel ischemia. Filling defect within the portal vein and superior mesenteric vein suggesting  thrombus. Diverticulosis but no evidence of diverticulitis. Repeat CT A/P IV Contrast 8/23/23 showed No acute abdominopelvic abnormality. Interval resolution of portal vein thrombus. Admission labs: glucose 108, LFTs within normal limits, pro 6.3, WBC, H&H within normal limits. Currently pt reports burning epigastric pain \"in the pit of my stomach, it burns lie there is dried up acid.  I lost 5# in a couple weeks, it midline shift. No abnormal extra-axial fluid collection. The gray-white differentiation is maintained without evidence of an acute infarct. There is no evidence of hydrocephalus. ORBITS: The visualized portion of the orbits demonstrate no acute abnormality. SINUSES: The visualized paranasal sinuses and mastoid air cells demonstrate no acute abnormality. SOFT TISSUES/SKULL:  No acute abnormality of the visualized skull or soft tissues. No acute intracranial abnormality. CT ABDOMEN PELVIS W IV CONTRAST Additional Contrast? None    Result Date: 8/23/2023  EXAMINATION: CT OF THE ABDOMEN AND PELVIS WITH CONTRAST 8/23/2023 7:03 pm TECHNIQUE: CT of the abdomen and pelvis was performed with the administration of intravenous contrast. Multiplanar reformatted images are provided for review. Automated exposure control, iterative reconstruction, and/or weight based adjustment of the mA/kV was utilized to reduce the radiation dose to as low as reasonably achievable. COMPARISON: 08/18/2023 HISTORY: ORDERING SYSTEM PROVIDED HISTORY: Recent dx portal vein thrombus TECHNOLOGIST PROVIDED HISTORY: Reason for exam:->Recent dx portal vein thrombus Additional Contrast?->None Decision Support Exception - unselect if not a suspected or confirmed emergency medical condition->Emergency Medical Condition (MA) FINDINGS: Lower Chest:  Visualized portion of the lower chest demonstrates no acute abnormality. Organs: There is focal fat deposition along either side of the falciform ligament. The main portal vein is patent. Previously seen nonocclusive portal vein thrombus is not confidently identified on today study. The gallbladder, spleen, pancreas, adrenals, and kidneys are unremarkable. GI/Bowel: There is no evidence of bowel obstruction. No evidence of abnormal bowel wall thickening or distension. The appendix is normal.  There is diverticulosis. Pelvis: The urinary bladder is partially filled. The uterus is unremarkable.

## 2023-08-24 NOTE — PLAN OF CARE
Problem: ABCDS Injury Assessment  Goal: Absence of physical injury  Outcome: Progressing     Problem: Pain  Goal: Verbalizes/displays adequate comfort level or baseline comfort level  8/24/2023 0416 by Jud Hill RN  Outcome: Progressing  8/24/2023 0416 by Jud Hill, RN  Outcome: Progressing

## 2023-08-24 NOTE — PROGRESS NOTES
Physical Therapy  Physical Therapy Initial Assessment    Name: No Browning  : 1955  MRN: [id number]      Date of Service: 2023    Evaluating PT:  Jose Mly Graves, PT, DPT [id number]    Room #:  5602/3292-U  Diagnosis:  Dizziness [R42]  Unable to ambulate [R26.2]  Cystitis without hematuria [N30.90]  Acute superficial gastritis without hemorrhage [K29.00]  Left upper quadrant abdominal pain [R10.12]  PMHx/PSHx:   has a past medical history of Abnormal Pap smear of cervix, Anxiety, and Detached retina, right.   has a past surgical history that includes Tonsillectomy and adenoidectomy; Cataract removal (11); and LEEP. Procedure/Surgery:  None  Precautions:  Falls  Equipment Needs:  None  Equipment Owned:  [employer] from 's prior surgery    SUBJECTIVE:    Pt lives with her  in a 1 story home with 2 stair(s) to enter and 0 rail(s). Bed is on 1 floor and bath is on 1 floor. Pt ambulated with no AD PTA. OBJECTIVE:   Initial Evaluation  Date: 2023 Treatment Short Term/ Long Term   Goals   AM-PAC 6 Clicks 40/55  71/09   Was pt agreeable to Eval/treatment? Yes     Does pt have pain? Yes (5/10 abdominal pain)     Bed Mobility  Rolling: NT  Supine to sit: Independent  Sit to supine: Independent  Scooting: Independent  Rolling: Independent  Supine to sit: Independent  Sit to supine: Independent  Scooting: Independent   Transfers Sit to stand: Supervision  Stand to sit: Supervision  Stand pivot: NT  Sit to stand: Independent  Stand to sit: Independent  Stand pivot: Independent   Ambulation    360 feet with no AD Supervision  1000 feet with no AD Independent   Stair negotiation: ascended and descended  NT  2 steps with 0 rails Independent   ROM BUE:  See OT note  BLE:  WFL     Strength BUE:  See OT note  BLE:  Knee extension 5/5; Ankle dorsiflexion 5/5;  Ankle plantarflexion 5/5     Balance Sitting EOB:  Independent  Dynamic Standing:  Supervision with no AD  Sitting EOB:  Independent  Dynamic Standing:  Independent with no AD     Pt is A & O x 4  Sensation:  Pt denies numbness/tingling to extremities  Edema:  Unremarkable    Patient education  Pt educated on role of PT. Patient response to education:   Pt expressed understanding Pt demonstrated skill Pt requires further education in this area   Yes Yes Reinforcement     ASSESSMENT:    Conditions Requiring Skilled Therapeutic Intervention:    []Decreased strength     []Decreased ROM  [x]Decreased functional mobility  []Decreased balance   []Decreased endurance   []Decreased posture  []Decreased sensation  []Decreased coordination   []Decreased vision  []Decreased safety awareness   [x]Increased pain       Comments:  Patient in burton's position upon arrival; agreeable to PT session with OT collaboration. Patient demonstrated good ability to negotiate positional changes for bed mobility and functional transfers with no dizziness or abnormal symptom response. Session continued with ambulation through unit with slowed speed and increased guarding noted. Overall good balance and tolerance to distance, with no increased pain during upright activity. Patient left in bed with call light in reach. Patient would benefit from continued skilled PT services to address functional deficits due to pain and prevent deconditioning. Treatment:  Patient practiced and was instructed in the following treatment:    Bed mobility - performed without physical assistance  Functional transfers - performed without physical assistance  Ambulation - performed without physical assistance    Pt's/ family goals   1. None stated    Prognosis is good for reaching above PT goals. Patient and or family understand(s) diagnosis, prognosis, and plan of care.   Yes    PHYSICAL THERAPY PLAN OF CARE:    PT POC is established based on physician order and patient diagnosis     Referring provider/PT Order:    08/24/23 0330  PT eval and treat  Start:  08/24/23 0330,   End:  08/24/23 0330,

## 2023-08-24 NOTE — PROGRESS NOTES
[address]        Date:2023                                                  Patient Name: Braulio Quinonez    MRN: [id number]    : 1955    Room: [address] [place] [address]      Evaluating OT: Amairani Thorne, Jayla Geena Saldana OTR/L; [id number]      Referring Provider: Shayla Mckeon DO    Specific Provider Orders/Date: OT Eval and Treat 23      Diagnosis:  ctable abdominal pain    Surgery: none this admission     Pertinent Medical History:  has a past medical history of Abnormal Pap smear of cervix, Anxiety, and Detached retina, right.       Reason for Admission: dizziness, L upper quadrant pain     Recommended Adaptive Equipment:  TBD pending progression     Precautions:  Fall Risk, IV     Assessment of current deficits:    [x] Functional mobility  [x]ADLs  [x] Strength               [x]Cognition    [x] Functional transfers   [x] IADLs         [x] Safety Awareness   [x]Endurance    [x] Fine Coordination              [x] Balance      [] Vision/perception   []Sensation     []Gross Motor Coordination  [] ROM  [] Delirium                   [] Motor Control     OT PLAN OF CARE   OT POC based on physician orders, patient diagnosis and results of clinical assessment    Frequency/Duration: 1-3 days/wk for 2 weeks PRN   Specific OT Treatment Interventions to include:   * Instruction/training on adapted ADL techniques and AE recommendations to increase functional independence within precautions       * Training on energy conservation strategies, correct breathing pattern and techniques to improve independence/tolerance for self-care routine  * Functional transfer/mobility training/DME recommendations for increased independence, safety, and fall prevention  * Patient/Family education to increase follow through with safety techniques and functional independence  * Recommendation of environmental

## 2023-08-24 NOTE — PROGRESS NOTES
4 Eyes Skin Assessment     NAME:  Tatiana Gallagher  YOB: 1955  MEDICAL RECORD NUMBER:  [id number]    The patient is being assessed for  Admission    I agree that at least one RN has performed a thorough Head to Toe Skin Assessment on the patient. ALL assessment sites listed below have been assessed. Areas assessed by both nurses:    Head, Face, Ears, Shoulders, Back, Chest, Arms, Elbows, Hands, Sacrum. Buttock, Coccyx, Ischium, and Legs. Feet and Heels        Does the Patient have a Wound?  No noted wound(s)     Ecchymosis on the left shin     Soft,boggy heels  Edison Prevention initiated by RN: Yes  Wound Care Orders initiated by RN: No    Pressure Injury (Stage 3,4, Unstageable, DTI, NWPT, and Complex wounds) if present, place Wound referral order by RN under : No    New Ostomies, if present place, Ostomy referral order under : No     Nurse 1 eSignature: Electronically signed by Irene Mcgovern RN on 8/24/23 at 3:38 AM EDT    **SHARE this note so that the co-signing nurse can place an eSignature**    Nurse 2 eSignature: Electronically signed by Nira Kussmaul, RN on 3/94/29 at 4:20 AM EDT

## 2023-08-24 NOTE — CARE COORDINATION
Social Work/Case Management Transition of Care Planning (Hai Brown [place] [phone]): Patient presented to the hospital with concerns of LUQ pain. She has had multiple hospital stays over the last several weeks for the same complaint. She was previously found to have a portal vein thrombosis and is on  chronic anticoagulation. Hepatobiliary scan has been ordered. Patient is on IV Rocephin q24. General surgery has been consulted. PT score noted to be 20. OT eval is pending. Met with patient at bedside. She resides in a 1 story home with 2 RAGHAVENDRA. She lives with her , Frannie Hughes. They do not have any children. Patient reports she is independent with all aspects of care. She stated she does not have any DME and no oxygen needs at home. PCP is Dr Jeanette Morales and she uses [facility] in [place]. No HHC or BLAYNE history. Discharge plan is to home with no needs.  will provide transport. CM/SW will follow. DUGLAS Martinez  8/24/2023    Case Management Assessment  Initial Evaluation    Date/Time of Evaluation: 8/24/2023 4:09 PM  Assessment Completed by: DUGLAS Martinez    If patient is discharged prior to next notation, then this note serves as note for discharge by case management. Patient Name: Ivory Romo                   YOB: 1955  Diagnosis: Dizziness [R42]  Unable to ambulate [R26.2]  Cystitis without hematuria [N30.90]  Acute superficial gastritis without hemorrhage [K29.00]  Left upper quadrant abdominal pain [R10.12]                   Date / Time: 8/23/2023  5:14 PM    Patient Admission Status: Inpatient   Readmission Risk (Low < 19, Mod (19-27), High > 27): Readmission Risk Score: 17.8    Current PCP: Jeanne Lr, DO  PCP verified by CM?  Yes    Chart Reviewed: Yes      History Provided by: Patient  Patient Orientation: Alert and Oriented, Person, Place, Situation, Self    Patient Cognition: Alert    Hospitalization in the last 30 days (Readmission):  Yes    If yes,

## 2023-08-24 NOTE — ACP (ADVANCE CARE PLANNING)
Advance Care Planning   The patient has the following advanced directives on file:  Advance Directives       Power of  Living Will ACP-Advance Directive ACP-Power of     Not on File Filed on 12/13/12 Filed Not on File            The patient has appointed the following active healthcare agents:    Primary Decision Maker (Active): Sera Delarosar - [phone]    The Patient has the following current code status:    Code Status: Full Code      DUGLAS Calzada  8/24/2023

## 2023-08-24 NOTE — H&P
[address], [address] [place] [address]                              HISTORY AND PHYSICAL    PATIENT NAME: Dorina Galvez                      :        1955  MED REC NO:   [id number]                            ROOM:       8421  ACCOUNT NO:   [de-identified]                           ADMIT DATE: 2023  PROVIDER:     Handy Eugene DO    CHIEF COMPLAINT:  Abdominal pain. HISTORY OF PRESENT ILLNESS:  The patient is a 43-year-old   female, presented to the emergency room complaining of severe epigastric  left upper quadrant abdominal pain. This has been ongoing. She had  recent hospital admission at which time a CTA of the abdomen reported  portal vein thrombosis. She was started on anticoagulation, seen by  Vascular, Hematology and Surgery. She was discharged home and presents  now with above symptoms. PAST MEDICAL HISTORY:  Detached retina and depression. PAST SURGICAL HISTORY:  Right eye detached retina surgery,  tonsillectomy, wisdom teeth extraction, right eye cataract surgery, left  wrist ganglion surgery. PRIMARY CARE PHYSICIAN:  Carrie Padilla DO    SOCIAL HISTORY:  No tobacco, no alcohol. MEDICATIONS PRIOR TO ADMISSION:  Reglan, Eliquis, Prilosec, Bentyl,  vitamin D.    REVIEW OF SYSTEMS:  Remarkable for above-stated chief complaint plus  chronic dizziness. ALLERGIES:  CODEINE and XANAX. PHYSICAL EXAMINATION:  GENERAL APPEARANCE:  Reveals a 43-year-old  female, who is  alert and oriented x3, cooperative and a good historian. VITAL SIGNS:  On admission; temperature 98.2, pulse 75, respirations 16,  blood pressure 136/67. HEENT:  Head:  Normocephalic, atraumatic. Eyes:  Left eye pupil round  and reactive to light. Right eye minimally reactive to light. Nose:   No obstruction, polyp or discharge noted. Mouth:  Mucosa without  lesion.   Pharynx:  Noninjected without exudate. NECK:  Supple. No JVD. No thyromegaly. No carotid bruits. HEART:  Regular rate and rhythm without murmur. LUNGS:  Clear to auscultation bilaterally. ABDOMEN:  Positive bowel sounds. Soft. There is tenderness to  palpation in the epigastrium. No rebound or guarding. No  hepatosplenomegaly. No masses. BACK:  With increased thoracic kyphosis. EXTREMITIES:  Without edema. LYMPH NODES:  No adenopathy noted. SKIN:  Without rash or lesion. IMPRESSION:  Intractable abdominal pain, recent diagnosis of portal vein  thrombosis, on chronic anticoagulation, history of right eye detached  retina, status post surgery. PLAN:  Admit. IV hydration. Surgery to see. Continue proton pump  inhibitor. Discharge plan, home when stable.         Nayan Gallardo DO    D: 08/24/2023 9:03:33       T: 08/24/2023 9:07:59     [email or web address]  Job#: [id number]     Doc#: [id number]    CC:

## 2023-08-24 NOTE — PLAN OF CARE
Problem: Pain  Goal: Verbalizes/displays adequate comfort level or baseline comfort level  8/24/2023 1028 by Norma Smallwood RN  Outcome: Progressing  Flowsheets (Taken 8/24/2023 0900)  Verbalizes/displays adequate comfort level or baseline comfort level: Encourage patient to monitor pain and request assistance  8/24/2023 0416 by Jud Hill RN  Outcome: Progressing  8/24/2023 0416 by Jud Hill RN  Outcome: Progressing     Problem: ABCDS Injury Assessment  Goal: Absence of physical injury  8/24/2023 1028 by Norma Smallwood RN  Outcome: Progressing  8/24/2023 0416 by Jud Hill RN  Outcome: Progressing

## 2023-08-25 ENCOUNTER — APPOINTMENT (OUTPATIENT)
Dept: NUCLEAR MEDICINE | Age: 68
DRG: 392 | End: 2023-08-25
Payer: MEDICARE

## 2023-08-25 LAB
MICROORGANISM SPEC CULT: ABNORMAL
SPECIMEN DESCRIPTION: ABNORMAL

## 2023-08-25 PROCEDURE — A9537 TC99M MEBROFENIN: HCPCS | Performed by: RADIOLOGY

## 2023-08-25 PROCEDURE — 6360000002 HC RX W HCPCS: Performed by: STUDENT IN AN ORGANIZED HEALTH CARE EDUCATION/TRAINING PROGRAM

## 2023-08-25 PROCEDURE — 3430000000 HC RX DIAGNOSTIC RADIOPHARMACEUTICAL: Performed by: RADIOLOGY

## 2023-08-25 PROCEDURE — 2580000003 HC RX 258: Performed by: INTERNAL MEDICINE

## 2023-08-25 PROCEDURE — 6370000000 HC RX 637 (ALT 250 FOR IP): Performed by: INTERNAL MEDICINE

## 2023-08-25 PROCEDURE — 6360000002 HC RX W HCPCS: Performed by: INTERNAL MEDICINE

## 2023-08-25 PROCEDURE — 1200000000 HC SEMI PRIVATE

## 2023-08-25 RX ORDER — PANTOPRAZOLE SODIUM 40 MG/1
40 TABLET, DELAYED RELEASE ORAL
Status: DISCONTINUED | OUTPATIENT
Start: 2023-08-25 | End: 2023-08-30 | Stop reason: HOSPADM

## 2023-08-25 RX ORDER — PROMETHAZINE HYDROCHLORIDE 25 MG/ML
12.5 INJECTION, SOLUTION INTRAMUSCULAR; INTRAVENOUS EVERY 6 HOURS PRN
Status: DISCONTINUED | OUTPATIENT
Start: 2023-08-25 | End: 2023-08-30 | Stop reason: HOSPADM

## 2023-08-25 RX ADMIN — APIXABAN 10 MG: 5 TABLET, FILM COATED ORAL at 10:37

## 2023-08-25 RX ADMIN — WATER 1000 MG: 1 INJECTION INTRAMUSCULAR; INTRAVENOUS; SUBCUTANEOUS at 21:42

## 2023-08-25 RX ADMIN — ONDANSETRON 4 MG: 2 INJECTION INTRAMUSCULAR; INTRAVENOUS at 08:55

## 2023-08-25 RX ADMIN — APIXABAN 10 MG: 5 TABLET, FILM COATED ORAL at 21:41

## 2023-08-25 RX ADMIN — Medication 9.5 MILLICURIE: at 07:51

## 2023-08-25 RX ADMIN — PROMETHAZINE HYDROCHLORIDE 12.5 MG: 25 INJECTION INTRAMUSCULAR; INTRAVENOUS at 12:55

## 2023-08-25 RX ADMIN — SODIUM CHLORIDE: 9 INJECTION, SOLUTION INTRAVENOUS at 22:42

## 2023-08-25 ASSESSMENT — PAIN DESCRIPTION - DESCRIPTORS: DESCRIPTORS: ACHING;DISCOMFORT;DULL

## 2023-08-25 ASSESSMENT — PAIN SCALES - GENERAL: PAINLEVEL_OUTOF10: 10

## 2023-08-25 ASSESSMENT — PAIN DESCRIPTION - LOCATION: LOCATION: ABDOMEN

## 2023-08-25 ASSESSMENT — PAIN DESCRIPTION - ORIENTATION: ORIENTATION: LEFT;LOWER

## 2023-08-25 ASSESSMENT — PAIN SCALES - WONG BAKER: WONGBAKER_NUMERICALRESPONSE: 2

## 2023-08-25 NOTE — PROGRESS NOTES
Pt was injected for HIDA scan. She stated she was nauseated and floor nurse was called to bring something down. In the meantime, patient refused to finish scan, said she was too nauseated, I explained nurse was bringing something down, she did not want to complete test today.

## 2023-08-25 NOTE — PLAN OF CARE
Problem: Safety - Adult  Goal: Free from fall injury  Outcome: Progressing     Problem: ABCDS Injury Assessment  Goal: Absence of physical injury  8/25/2023 0024 by Lona Wall RN  Outcome: Progressing       Problem: Pain  Goal: Verbalizes/displays adequate comfort level or baseline comfort level  8/25/2023 0025 by Lona Wall RN  Outcome: Progressing

## 2023-08-25 NOTE — CARE COORDINATION
Social Work/Case Management Transition of Care Planning (Chepe Oneill Kourtney Gamino [phone]): Per report and chart review, patient was scheduled for HIDA scan today. However, she became nauseated and refused to complete the scan. Patient remains on IV Rocephin q24. Per attending, patient may need an EGD if she has not had one recently. PT/OT scores noted to be 20/19. Discharge plan is to home with no needs.  will provide transport. CM/SW will follow.   DUGLAS Arteaga  8/25/2023

## 2023-08-26 PROCEDURE — 2580000003 HC RX 258: Performed by: INTERNAL MEDICINE

## 2023-08-26 PROCEDURE — 6370000000 HC RX 637 (ALT 250 FOR IP): Performed by: INTERNAL MEDICINE

## 2023-08-26 PROCEDURE — 6360000002 HC RX W HCPCS: Performed by: INTERNAL MEDICINE

## 2023-08-26 PROCEDURE — 1200000000 HC SEMI PRIVATE

## 2023-08-26 PROCEDURE — 6370000000 HC RX 637 (ALT 250 FOR IP)

## 2023-08-26 RX ORDER — HYDROXYZINE HYDROCHLORIDE 50 MG/ML
25 INJECTION, SOLUTION INTRAMUSCULAR EVERY 6 HOURS PRN
Status: DISCONTINUED | OUTPATIENT
Start: 2023-08-26 | End: 2023-08-26

## 2023-08-26 RX ORDER — HYDROXYZINE PAMOATE 25 MG/1
25 CAPSULE ORAL EVERY 6 HOURS PRN
Status: DISCONTINUED | OUTPATIENT
Start: 2023-08-26 | End: 2023-08-30 | Stop reason: HOSPADM

## 2023-08-26 RX ADMIN — SODIUM CHLORIDE: 9 INJECTION, SOLUTION INTRAVENOUS at 12:11

## 2023-08-26 RX ADMIN — APIXABAN 10 MG: 5 TABLET, FILM COATED ORAL at 21:28

## 2023-08-26 RX ADMIN — HYDROXYZINE PAMOATE 25 MG: 25 CAPSULE ORAL at 21:33

## 2023-08-26 RX ADMIN — APIXABAN 10 MG: 5 TABLET, FILM COATED ORAL at 10:04

## 2023-08-26 RX ADMIN — WATER 1000 MG: 1 INJECTION INTRAMUSCULAR; INTRAVENOUS; SUBCUTANEOUS at 21:28

## 2023-08-26 RX ADMIN — PANTOPRAZOLE SODIUM 40 MG: 40 TABLET, DELAYED RELEASE ORAL at 10:04

## 2023-08-26 RX ADMIN — PANTOPRAZOLE SODIUM 40 MG: 40 TABLET, DELAYED RELEASE ORAL at 16:17

## 2023-08-26 ASSESSMENT — PAIN SCALES - WONG BAKER
WONGBAKER_NUMERICALRESPONSE: 2
WONGBAKER_NUMERICALRESPONSE: 2

## 2023-08-27 PROCEDURE — 1200000000 HC SEMI PRIVATE

## 2023-08-27 PROCEDURE — 6360000002 HC RX W HCPCS: Performed by: INTERNAL MEDICINE

## 2023-08-27 PROCEDURE — 2580000003 HC RX 258: Performed by: INTERNAL MEDICINE

## 2023-08-27 PROCEDURE — 6370000000 HC RX 637 (ALT 250 FOR IP): Performed by: INTERNAL MEDICINE

## 2023-08-27 PROCEDURE — 6370000000 HC RX 637 (ALT 250 FOR IP)

## 2023-08-27 PROCEDURE — 6360000002 HC RX W HCPCS: Performed by: STUDENT IN AN ORGANIZED HEALTH CARE EDUCATION/TRAINING PROGRAM

## 2023-08-27 RX ORDER — MIRTAZAPINE 15 MG/1
7.5 TABLET, ORALLY DISINTEGRATING ORAL NIGHTLY
Status: DISCONTINUED | OUTPATIENT
Start: 2023-08-27 | End: 2023-08-30 | Stop reason: HOSPADM

## 2023-08-27 RX ORDER — DULOXETIN HYDROCHLORIDE 20 MG/1
20 CAPSULE, DELAYED RELEASE ORAL DAILY
Status: DISCONTINUED | OUTPATIENT
Start: 2023-08-27 | End: 2023-08-30 | Stop reason: HOSPADM

## 2023-08-27 RX ORDER — BISACODYL 5 MG/1
5 TABLET, DELAYED RELEASE ORAL DAILY
Status: DISCONTINUED | OUTPATIENT
Start: 2023-08-27 | End: 2023-08-27

## 2023-08-27 RX ORDER — BISACODYL 10 MG
10 SUPPOSITORY, RECTAL RECTAL DAILY
Status: DISCONTINUED | OUTPATIENT
Start: 2023-08-27 | End: 2023-08-30 | Stop reason: HOSPADM

## 2023-08-27 RX ADMIN — PROMETHAZINE HYDROCHLORIDE 12.5 MG: 25 INJECTION INTRAMUSCULAR; INTRAVENOUS at 22:11

## 2023-08-27 RX ADMIN — HYDROXYZINE PAMOATE 25 MG: 25 CAPSULE ORAL at 22:10

## 2023-08-27 RX ADMIN — SODIUM CHLORIDE: 9 INJECTION, SOLUTION INTRAVENOUS at 01:42

## 2023-08-27 RX ADMIN — SODIUM CHLORIDE: 9 INJECTION, SOLUTION INTRAVENOUS at 15:13

## 2023-08-27 RX ADMIN — APIXABAN 10 MG: 5 TABLET, FILM COATED ORAL at 09:45

## 2023-08-27 RX ADMIN — PROMETHAZINE HYDROCHLORIDE 12.5 MG: 25 INJECTION INTRAMUSCULAR; INTRAVENOUS at 09:45

## 2023-08-27 RX ADMIN — PROMETHAZINE HYDROCHLORIDE 12.5 MG: 25 INJECTION INTRAMUSCULAR; INTRAVENOUS at 02:22

## 2023-08-27 RX ADMIN — WATER 1000 MG: 1 INJECTION INTRAMUSCULAR; INTRAVENOUS; SUBCUTANEOUS at 22:10

## 2023-08-27 RX ADMIN — PANTOPRAZOLE SODIUM 40 MG: 40 TABLET, DELAYED RELEASE ORAL at 09:45

## 2023-08-27 RX ADMIN — APIXABAN 10 MG: 5 TABLET, FILM COATED ORAL at 22:16

## 2023-08-27 RX ADMIN — PANTOPRAZOLE SODIUM 40 MG: 40 TABLET, DELAYED RELEASE ORAL at 17:20

## 2023-08-27 ASSESSMENT — PAIN SCALES - GENERAL
PAINLEVEL_OUTOF10: 1
PAINLEVEL_OUTOF10: 1

## 2023-08-27 ASSESSMENT — PAIN DESCRIPTION - DESCRIPTORS: DESCRIPTORS: SPASM

## 2023-08-27 ASSESSMENT — PAIN DESCRIPTION - LOCATION: LOCATION: ABDOMEN

## 2023-08-27 NOTE — CONSULTS
Reason for consult: Depression, anxiety    80-year-old  female with history of depression and anxiety presented to the hospital with vertigo and intractable abdominal pain. She is currently on the medical floor getting worked up for the abdominal pain. Patient reports that the symptoms started about a month ago but denies any recent changes prior. Patient says she has a history of depression and anxiety and she used to take Paxil many years ago she was recently started on Prozac however she is unclear if it works or not. . On my examination, she was extremely anxious and nervous on evaluation. Patient states that she used to be very active however about a month ago she started having symptoms of vertigo and abdominal pain which continued to worsen and no one seems to be able to figure out what is wrong with her. Patient has had extensive workup since and procedures done to evaluate this. Patient says that she feels frustrated currently. She has no appetite and has lost a significant amount of weight in the last month because every time she eats she has abdominal pain and nausea. Patient states that she lives with her  who is supportive however she feels that she has no quality of life anymore and she feels helpless and hopeless about her condition. She states \"I feel like this is the end, if you know what I mean\". She however denies any active suicidal thoughts says that she would never actually hurt herself. She reports poor appetite, anhedonia, sadness and guilt hopelessness. She denies any prior suicide attempts or self-injurious behaviors. She denies any hypomania or hernandez currently or in the past.  Denies homicidal ideations, auditory hallucinations or paranoia however she is extremely anxious dysphoric depressed and appears highly hopeless regarding her life and her prognosis.   I discussed with her about coming to the inpatient psychiatric unit when she is medically cleared and

## 2023-08-27 NOTE — PLAN OF CARE
Problem: Pain  Goal: Verbalizes/displays adequate comfort level or baseline comfort level  8/26/2023 2236 by Trevor Calzada RN  Outcome: Progressing  8/26/2023 1418 by Faith Arguello RN  Outcome: Progressing     Problem: ABCDS Injury Assessment  Goal: Absence of physical injury  8/26/2023 2236 by Trevor Calzada RN  Outcome: Progressing  8/26/2023 1418 by Faith Arguello RN  Outcome: Progressing     Problem: Safety - Adult  Goal: Free from fall injury  Outcome: Progressing

## 2023-08-28 DIAGNOSIS — Z12.11 SCREENING FOR MALIGNANT NEOPLASM OF COLON: Primary | ICD-10-CM

## 2023-08-28 LAB
CONTROL: NORMAL
FECAL BLOOD IMMUNOCHEMICAL TEST: NORMAL

## 2023-08-28 PROCEDURE — 99222 1ST HOSP IP/OBS MODERATE 55: CPT | Performed by: STUDENT IN AN ORGANIZED HEALTH CARE EDUCATION/TRAINING PROGRAM

## 2023-08-28 PROCEDURE — 6370000000 HC RX 637 (ALT 250 FOR IP): Performed by: STUDENT IN AN ORGANIZED HEALTH CARE EDUCATION/TRAINING PROGRAM

## 2023-08-28 PROCEDURE — 82274 ASSAY TEST FOR BLOOD FECAL: CPT | Performed by: FAMILY MEDICINE

## 2023-08-28 PROCEDURE — 6360000002 HC RX W HCPCS: Performed by: INTERNAL MEDICINE

## 2023-08-28 PROCEDURE — 1200000000 HC SEMI PRIVATE

## 2023-08-28 PROCEDURE — 6370000000 HC RX 637 (ALT 250 FOR IP)

## 2023-08-28 PROCEDURE — 2580000003 HC RX 258: Performed by: INTERNAL MEDICINE

## 2023-08-28 PROCEDURE — 6370000000 HC RX 637 (ALT 250 FOR IP): Performed by: INTERNAL MEDICINE

## 2023-08-28 PROCEDURE — 99232 SBSQ HOSP IP/OBS MODERATE 35: CPT | Performed by: CLINICAL NURSE SPECIALIST

## 2023-08-28 RX ORDER — NORTRIPTYLINE HYDROCHLORIDE 25 MG/1
25 CAPSULE ORAL NIGHTLY
Status: DISCONTINUED | OUTPATIENT
Start: 2023-08-28 | End: 2023-08-30 | Stop reason: HOSPADM

## 2023-08-28 RX ORDER — SODIUM CHLORIDE, SODIUM LACTATE, POTASSIUM CHLORIDE, CALCIUM CHLORIDE 600; 310; 30; 20 MG/100ML; MG/100ML; MG/100ML; MG/100ML
INJECTION, SOLUTION INTRAVENOUS CONTINUOUS
Status: DISCONTINUED | OUTPATIENT
Start: 2023-08-28 | End: 2023-08-28

## 2023-08-28 RX ADMIN — NORTRIPTYLINE HYDROCHLORIDE 25 MG: 25 CAPSULE ORAL at 21:26

## 2023-08-28 RX ADMIN — PANTOPRAZOLE SODIUM 40 MG: 40 TABLET, DELAYED RELEASE ORAL at 06:37

## 2023-08-28 RX ADMIN — PANTOPRAZOLE SODIUM 40 MG: 40 TABLET, DELAYED RELEASE ORAL at 16:58

## 2023-08-28 RX ADMIN — ONDANSETRON 4 MG: 2 INJECTION INTRAMUSCULAR; INTRAVENOUS at 21:26

## 2023-08-28 RX ADMIN — APIXABAN 5 MG: 5 TABLET, FILM COATED ORAL at 16:58

## 2023-08-28 RX ADMIN — APIXABAN 5 MG: 5 TABLET, FILM COATED ORAL at 21:26

## 2023-08-28 RX ADMIN — WATER 1000 MG: 1 INJECTION INTRAMUSCULAR; INTRAVENOUS; SUBCUTANEOUS at 21:26

## 2023-08-28 ASSESSMENT — PAIN DESCRIPTION - FREQUENCY: FREQUENCY: CONTINUOUS

## 2023-08-28 ASSESSMENT — PAIN SCALES - GENERAL
PAINLEVEL_OUTOF10: 5
PAINLEVEL_OUTOF10: 0

## 2023-08-28 ASSESSMENT — PAIN DESCRIPTION - ORIENTATION: ORIENTATION: LOWER

## 2023-08-28 ASSESSMENT — PAIN DESCRIPTION - ONSET: ONSET: ON-GOING

## 2023-08-28 ASSESSMENT — PAIN DESCRIPTION - DESCRIPTORS: DESCRIPTORS: ACHING;DISCOMFORT;SORE

## 2023-08-28 ASSESSMENT — PAIN DESCRIPTION - LOCATION: LOCATION: ABDOMEN

## 2023-08-28 ASSESSMENT — PAIN - FUNCTIONAL ASSESSMENT: PAIN_FUNCTIONAL_ASSESSMENT: ACTIVITIES ARE NOT PREVENTED

## 2023-08-28 ASSESSMENT — PAIN DESCRIPTION - PAIN TYPE: TYPE: ACUTE PAIN

## 2023-08-28 NOTE — PROGRESS NOTES
Internal Medicine Progress Note    Patient's name: Candia Crigler  : 1955  Chief complaints (on day of admission): Abdominal Pain (LUQ pain, ongoing issue)  Admission date: 2023  Date of service: 2023   Room: [facility] SURG ONC  Primary care physician: Sadie Garcia DO  Reason for visit: Follow-up for     Subjective  Halima Garcia was seen and examined at bedside     Complaining of abdominal pain   Wants to see a surgeon in regard to egd will place consult for her   Going for hida today   Abdominal exam is benign     Review of Systems  There are no new complaints of chest pain, shortness of breath, abdominal pain, nausea, vomiting, diarrhea, constipation unless otherwise mentioned above.      Hospital Medications  Current Facility-Administered Medications   Medication Dose Route Frequency Provider Last Rate Last Admin    bisacodyl (DULCOLAX) suppository 10 mg  10 mg Rectal Daily Iva Garcia DO        mirtazapine (REMERON SOL-TAB) disintegrating tablet 7.5 mg  7.5 mg Oral Nightly Jaida Urban MD        DULoxetine (CYMBALTA) extended release capsule 20 mg  20 mg Oral Daily Jaida Urban MD        hydrOXYzine pamoate (VISTARIL) capsule 25 mg  25 mg Oral Q6H PRN Sanket Patricio MD   25 mg at 23 221    pantoprazole (PROTONIX) tablet 40 mg  40 mg Oral BID AC Kiran Olmstead, DO   40 mg at 23 8529    promethazine (PHENERGAN) injection 12.5 mg  12.5 mg IntraMUSCular Q6H PRN Gerald Lucas, DO   12.5 mg at 23 221    cefTRIAXone (ROCEPHIN) 1,000 mg in sterile water 10 mL IV syringe  1,000 mg IntraVENous Q24H Fawn Elam DO   1,000 mg at 23 221    sodium chloride flush 0.9 % injection 5-40 mL  5-40 mL IntraVENous 2 times per day Fawn Elam, DO   10 mL at 23    sodium chloride flush 0.9 % injection 5-40 mL  5-40 mL IntraVENous PRN Ferry Merritts Malmer, DO        0.9 % sodium chloride infusion   IntraVENous PRN Fawn Elam DO

## 2023-08-28 NOTE — PROGRESS NOTES
Per nuclear medicine, HIDA cannot be done until Tuesday. Pt ok for low fat regular diet today, NPO at midnight.      Electronically signed by Fortunato Prader, DO on 8/28/2023 at 12:56 PM

## 2023-08-28 NOTE — CARE COORDINATION
Social Work/Case Management Transition of Care Planning (Johnathon Common [place] [phone]): Per report and chart review, HIDA scan will be attempted again today. Patient remains on IV Rocephin q24. Psych was consulted. They indicated the patient is appropriate for a voluntary psych admission when medically cleared. Cymbalta and Remeron were started. Met with patient at bedside. She indicated she does not want to go to psych when medically stable. Discharge plan is to home with no needs.  will provide transport. CM/SW will follow.   Johnathon Sparrow, DUGLAS  8/28/2023

## 2023-08-29 ENCOUNTER — APPOINTMENT (OUTPATIENT)
Dept: NUCLEAR MEDICINE | Age: 68
DRG: 392 | End: 2023-08-29
Payer: MEDICARE

## 2023-08-29 LAB
ALBUMIN SERPL-MCNC: 3.7 G/DL (ref 3.5–5.2)
ALP SERPL-CCNC: 71 U/L (ref 35–104)
ALT SERPL-CCNC: 19 U/L (ref 0–32)
ANION GAP SERPL CALCULATED.3IONS-SCNC: 13 MMOL/L (ref 7–16)
AST SERPL-CCNC: 18 U/L (ref 0–31)
BASOPHILS # BLD: 0.04 K/UL (ref 0–0.2)
BASOPHILS NFR BLD: 1 % (ref 0–2)
BILIRUB SERPL-MCNC: 0.4 MG/DL (ref 0–1.2)
BUN SERPL-MCNC: 7 MG/DL (ref 6–23)
CALCIUM SERPL-MCNC: 8.4 MG/DL (ref 8.6–10.2)
CHLORIDE SERPL-SCNC: 105 MMOL/L (ref 98–107)
CO2 SERPL-SCNC: 19 MMOL/L (ref 22–29)
CREAT SERPL-MCNC: 0.7 MG/DL (ref 0.5–1)
CRP SERPL HS-MCNC: <3 MG/L (ref 0–5)
EOSINOPHIL # BLD: 0.08 K/UL (ref 0.05–0.5)
EOSINOPHILS RELATIVE PERCENT: 1 % (ref 0–6)
ERYTHROCYTE [DISTWIDTH] IN BLOOD BY AUTOMATED COUNT: 13.2 % (ref 11.5–15)
ERYTHROCYTE [SEDIMENTATION RATE] IN BLOOD BY WESTERGREN METHOD: 5 MM/HR (ref 0–20)
GFR SERPL CREATININE-BSD FRML MDRD: >60 ML/MIN/1.73M2
GLUCOSE SERPL-MCNC: 65 MG/DL (ref 74–99)
HCT VFR BLD AUTO: 36.8 % (ref 34–48)
HGB BLD-MCNC: 12.2 G/DL (ref 11.5–15.5)
IMM GRANULOCYTES # BLD AUTO: <0.03 K/UL (ref 0–0.58)
IMM GRANULOCYTES NFR BLD: 0 % (ref 0–5)
LYMPHOCYTES NFR BLD: 0.8 K/UL (ref 1.5–4)
LYMPHOCYTES RELATIVE PERCENT: 15 % (ref 20–42)
MCH RBC QN AUTO: 30.2 PG (ref 26–35)
MCHC RBC AUTO-ENTMCNC: 33.2 G/DL (ref 32–34.5)
MCV RBC AUTO: 91.1 FL (ref 80–99.9)
MONOCYTES NFR BLD: 0.45 K/UL (ref 0.1–0.95)
MONOCYTES NFR BLD: 8 % (ref 2–12)
NEUTROPHILS NFR BLD: 75 % (ref 43–80)
NEUTS SEG NFR BLD: 4.15 K/UL (ref 1.8–7.3)
PLATELET # BLD AUTO: 274 K/UL (ref 130–450)
PMV BLD AUTO: 9.9 FL (ref 7–12)
POTASSIUM SERPL-SCNC: 3.7 MMOL/L (ref 3.5–5)
PROCALCITONIN SERPL-MCNC: 0.05 NG/ML (ref 0–0.08)
PROT SERPL-MCNC: 5.8 G/DL (ref 6.4–8.3)
RBC # BLD AUTO: 4.04 M/UL (ref 3.5–5.5)
SODIUM SERPL-SCNC: 137 MMOL/L (ref 132–146)
WBC OTHER # BLD: 5.5 K/UL (ref 4.5–11.5)

## 2023-08-29 PROCEDURE — 6370000000 HC RX 637 (ALT 250 FOR IP): Performed by: STUDENT IN AN ORGANIZED HEALTH CARE EDUCATION/TRAINING PROGRAM

## 2023-08-29 PROCEDURE — 97530 THERAPEUTIC ACTIVITIES: CPT

## 2023-08-29 PROCEDURE — 1200000000 HC SEMI PRIVATE

## 2023-08-29 PROCEDURE — 86140 C-REACTIVE PROTEIN: CPT

## 2023-08-29 PROCEDURE — 6360000002 HC RX W HCPCS: Performed by: INTERNAL MEDICINE

## 2023-08-29 PROCEDURE — 99232 SBSQ HOSP IP/OBS MODERATE 35: CPT | Performed by: CLINICAL NURSE SPECIALIST

## 2023-08-29 PROCEDURE — 84145 PROCALCITONIN (PCT): CPT

## 2023-08-29 PROCEDURE — 2580000003 HC RX 258: Performed by: INTERNAL MEDICINE

## 2023-08-29 PROCEDURE — 85652 RBC SED RATE AUTOMATED: CPT

## 2023-08-29 PROCEDURE — 80053 COMPREHEN METABOLIC PANEL: CPT

## 2023-08-29 PROCEDURE — 85025 COMPLETE CBC W/AUTO DIFF WBC: CPT

## 2023-08-29 PROCEDURE — 6370000000 HC RX 637 (ALT 250 FOR IP): Performed by: INTERNAL MEDICINE

## 2023-08-29 PROCEDURE — 6370000000 HC RX 637 (ALT 250 FOR IP)

## 2023-08-29 PROCEDURE — 36415 COLL VENOUS BLD VENIPUNCTURE: CPT

## 2023-08-29 RX ORDER — LIDOCAINE 4 G/G
1 PATCH TOPICAL DAILY
Status: DISCONTINUED | OUTPATIENT
Start: 2023-08-29 | End: 2023-08-30 | Stop reason: HOSPADM

## 2023-08-29 RX ADMIN — SODIUM CHLORIDE, PRESERVATIVE FREE 10 ML: 5 INJECTION INTRAVENOUS at 08:08

## 2023-08-29 RX ADMIN — ONDANSETRON 4 MG: 2 INJECTION INTRAMUSCULAR; INTRAVENOUS at 08:08

## 2023-08-29 RX ADMIN — ONDANSETRON 4 MG: 2 INJECTION INTRAMUSCULAR; INTRAVENOUS at 21:43

## 2023-08-29 RX ADMIN — HYDROXYZINE PAMOATE 25 MG: 25 CAPSULE ORAL at 21:43

## 2023-08-29 RX ADMIN — WATER 1000 MG: 1 INJECTION INTRAMUSCULAR; INTRAVENOUS; SUBCUTANEOUS at 21:43

## 2023-08-29 RX ADMIN — APIXABAN 5 MG: 5 TABLET, FILM COATED ORAL at 10:59

## 2023-08-29 RX ADMIN — APIXABAN 5 MG: 5 TABLET, FILM COATED ORAL at 21:43

## 2023-08-29 RX ADMIN — NORTRIPTYLINE HYDROCHLORIDE 25 MG: 25 CAPSULE ORAL at 21:43

## 2023-08-29 ASSESSMENT — PAIN DESCRIPTION - FREQUENCY
FREQUENCY: CONTINUOUS
FREQUENCY: CONTINUOUS

## 2023-08-29 ASSESSMENT — PAIN SCALES - GENERAL
PAINLEVEL_OUTOF10: 8
PAINLEVEL_OUTOF10: 5

## 2023-08-29 ASSESSMENT — PAIN DESCRIPTION - LOCATION
LOCATION: ABDOMEN
LOCATION: ABDOMEN

## 2023-08-29 ASSESSMENT — PAIN DESCRIPTION - DESCRIPTORS
DESCRIPTORS: ACHING;DISCOMFORT;SORE
DESCRIPTORS: ACHING;DISCOMFORT;SORE

## 2023-08-29 ASSESSMENT — PAIN - FUNCTIONAL ASSESSMENT
PAIN_FUNCTIONAL_ASSESSMENT: ACTIVITIES ARE NOT PREVENTED
PAIN_FUNCTIONAL_ASSESSMENT: ACTIVITIES ARE NOT PREVENTED

## 2023-08-29 ASSESSMENT — PAIN DESCRIPTION - ONSET
ONSET: ON-GOING
ONSET: ON-GOING

## 2023-08-29 ASSESSMENT — PAIN DESCRIPTION - PAIN TYPE
TYPE: ACUTE PAIN
TYPE: ACUTE PAIN

## 2023-08-29 ASSESSMENT — PAIN DESCRIPTION - ORIENTATION
ORIENTATION: LOWER
ORIENTATION: RIGHT;LEFT;LOWER

## 2023-08-29 NOTE — CARE COORDINATION
Social Work/Case Management Transition of Care Planning (Johnson Fitch [place] [phone]): Per report and chart review, HIDA scan has not yet been able to be completed. Patient is scheduled to go today after being medicated. Patient remains on IV Rocephin q24. General surgery has been consulted for possible EGD. Discharge plan is to home with no needs.  will provide transport. CM/SW will follow.   DUGLAS Gilbert  8/29/2023

## 2023-08-29 NOTE — PROGRESS NOTES
Patient is refusing HIDA today as well. She said she is in too much pain to sit through a two hour scan. I offered ativan to make her less anxious during the scan, she refused. Unfortuanertly, we cannot use narcotics before a HIDA scan. I spent at least ten minutes explaining to her the benefits of getting the test done. I went over the risks of not doing the test. I explained that without the HIDA scan we are not able know if she has gallbladder disease or not, and that inability to do so might put her at risk for serious morbidity. She still refused the test.     Since the patient is refusing HIDA, there is nothing much we can offer her. We are waiting for GI's recommendation on whether they want to do an EGD or not.       Electronically signed by Tony Diaz DO on 8/29/2023 at 1:12 PM

## 2023-08-29 NOTE — PROGRESS NOTES
Patient has refused medications, except for eliquis. Patient has not completed NM scan due to pain and has been requesting pain medications. Narcotics cannot be given prior to the NM scan. Patient now refusing scan at this time.

## 2023-08-29 NOTE — PROGRESS NOTES
BUE:  See OT note  BLE:  Knee extension 5/5; Ankle dorsiflexion 5/5; Ankle plantarflexion 5/5     Balance Sitting EOB:  Independent  Dynamic Standing:  Supervision with no AD Sitting EOB:  Independent  Dynamic Standing:  SBA with no AD (pushing IV pole) Sitting EOB:  Independent  Dynamic Standing:  Independent with no AD     Sensation:  No reports of numbness/tingling to extremities  Edema:  Unremarkable    Vitals:   HR 85, SpO2 99% with activity. Patient education  Pt educated on safety during functional mobility. Patient response to education:   Pt expressed understanding Pt demonstrated skill Pt requires further education in this area   Yes Yes Reinforcement     ASSESSMENT:    Comments:  Session cleared by nurse. Patient in semi-Rutherford's position upon arrival; agreeable to PT session. Patient impulsive. Denied dizziness, shortness of breath. Declined to complete additional ambulation due to fatigue. Patient left in semi-Rutherford's position with call light in reach. Treatment:  Patient practiced and was instructed in the following treatment:    Bed mobility - performed without physical assistance  Functional transfers - performed without physical assistance  Ambulation - physical assistance provided as needed during activity  Skilled monitoring of vitals    PLAN:    Patient is making good progress towards established goals. Will continue with current POC.       Time in  1557  Time out  1605    Total Treatment Time  8 minutes     CPT codes:  [] Gait training 22684 0 minutes  [] Manual therapy 19235 0 minutes  [x] Therapeutic activities 49642 8 minutes  [] Therapeutic exercises 27019 0 minutes  [] Neuromuscular reeducation 40577 0 minutes    Gema Pryor, PT, DPT  [id number]

## 2023-08-30 VITALS
WEIGHT: 111 LBS | RESPIRATION RATE: 18 BRPM | HEIGHT: 62 IN | TEMPERATURE: 97.5 F | HEART RATE: 70 BPM | DIASTOLIC BLOOD PRESSURE: 63 MMHG | SYSTOLIC BLOOD PRESSURE: 113 MMHG | BODY MASS INDEX: 20.43 KG/M2 | OXYGEN SATURATION: 99 %

## 2023-08-30 LAB
ALBUMIN SERPL-MCNC: 3.8 G/DL (ref 3.5–5.2)
ALP SERPL-CCNC: 66 U/L (ref 35–104)
ALT SERPL-CCNC: 17 U/L (ref 0–32)
ANION GAP SERPL CALCULATED.3IONS-SCNC: 18 MMOL/L (ref 7–16)
AST SERPL-CCNC: 16 U/L (ref 0–31)
BASOPHILS # BLD: 0.05 K/UL (ref 0–0.2)
BASOPHILS NFR BLD: 1 % (ref 0–2)
BILIRUB SERPL-MCNC: 0.3 MG/DL (ref 0–1.2)
BUN SERPL-MCNC: 7 MG/DL (ref 6–23)
CALCIUM SERPL-MCNC: 8.3 MG/DL (ref 8.6–10.2)
CHLORIDE SERPL-SCNC: 107 MMOL/L (ref 98–107)
CO2 SERPL-SCNC: 16 MMOL/L (ref 22–29)
CREAT SERPL-MCNC: 0.7 MG/DL (ref 0.5–1)
EOSINOPHIL # BLD: 0.18 K/UL (ref 0.05–0.5)
EOSINOPHILS RELATIVE PERCENT: 3 % (ref 0–6)
ERYTHROCYTE [DISTWIDTH] IN BLOOD BY AUTOMATED COUNT: 13.3 % (ref 11.5–15)
GFR SERPL CREATININE-BSD FRML MDRD: >60 ML/MIN/1.73M2
GLUCOSE BLD-MCNC: 51 MG/DL (ref 74–99)
GLUCOSE BLD-MCNC: 99 MG/DL (ref 74–99)
GLUCOSE SERPL-MCNC: 52 MG/DL (ref 74–99)
HCT VFR BLD AUTO: 35.5 % (ref 34–48)
HGB BLD-MCNC: 11.8 G/DL (ref 11.5–15.5)
IMM GRANULOCYTES # BLD AUTO: <0.03 K/UL (ref 0–0.58)
IMM GRANULOCYTES NFR BLD: 0 % (ref 0–5)
LYMPHOCYTES NFR BLD: 1.07 K/UL (ref 1.5–4)
LYMPHOCYTES RELATIVE PERCENT: 18 % (ref 20–42)
MAGNESIUM SERPL-MCNC: 2 MG/DL (ref 1.6–2.6)
MCH RBC QN AUTO: 30.3 PG (ref 26–35)
MCHC RBC AUTO-ENTMCNC: 33.2 G/DL (ref 32–34.5)
MCV RBC AUTO: 91.3 FL (ref 80–99.9)
MONOCYTES NFR BLD: 0.6 K/UL (ref 0.1–0.95)
MONOCYTES NFR BLD: 10 % (ref 2–12)
NEUTROPHILS NFR BLD: 67 % (ref 43–80)
NEUTS SEG NFR BLD: 3.95 K/UL (ref 1.8–7.3)
PLATELET # BLD AUTO: 282 K/UL (ref 130–450)
PMV BLD AUTO: 10 FL (ref 7–12)
POTASSIUM SERPL-SCNC: 3.4 MMOL/L (ref 3.5–5)
PROT SERPL-MCNC: 5.6 G/DL (ref 6.4–8.3)
RBC # BLD AUTO: 3.89 M/UL (ref 3.5–5.5)
SODIUM SERPL-SCNC: 141 MMOL/L (ref 132–146)
WBC OTHER # BLD: 5.9 K/UL (ref 4.5–11.5)

## 2023-08-30 PROCEDURE — 36415 COLL VENOUS BLD VENIPUNCTURE: CPT

## 2023-08-30 PROCEDURE — 85025 COMPLETE CBC W/AUTO DIFF WBC: CPT

## 2023-08-30 PROCEDURE — 82962 GLUCOSE BLOOD TEST: CPT

## 2023-08-30 PROCEDURE — 80053 COMPREHEN METABOLIC PANEL: CPT

## 2023-08-30 PROCEDURE — 6370000000 HC RX 637 (ALT 250 FOR IP): Performed by: INTERNAL MEDICINE

## 2023-08-30 PROCEDURE — 83735 ASSAY OF MAGNESIUM: CPT

## 2023-08-30 PROCEDURE — 6370000000 HC RX 637 (ALT 250 FOR IP)

## 2023-08-30 PROCEDURE — 99232 SBSQ HOSP IP/OBS MODERATE 35: CPT | Performed by: NURSE PRACTITIONER

## 2023-08-30 PROCEDURE — 6370000000 HC RX 637 (ALT 250 FOR IP): Performed by: PSYCHIATRY & NEUROLOGY

## 2023-08-30 RX ORDER — PROMETHAZINE HYDROCHLORIDE 25 MG/1
25 TABLET ORAL 4 TIMES DAILY PRN
Qty: 20 TABLET | Refills: 0 | Status: SHIPPED | OUTPATIENT
Start: 2023-08-30 | End: 2023-09-02 | Stop reason: SDUPTHER

## 2023-08-30 RX ORDER — DULOXETIN HYDROCHLORIDE 20 MG/1
20 CAPSULE, DELAYED RELEASE ORAL DAILY
Qty: 30 CAPSULE | Refills: 3 | Status: SHIPPED | OUTPATIENT
Start: 2023-08-31 | End: 2023-09-08

## 2023-08-30 RX ORDER — PANTOPRAZOLE SODIUM 40 MG/1
40 TABLET, DELAYED RELEASE ORAL
Qty: 30 TABLET | Refills: 3 | Status: SHIPPED | OUTPATIENT
Start: 2023-08-30 | End: 2023-09-08

## 2023-08-30 RX ORDER — MIRTAZAPINE 15 MG/1
7.5 TABLET, ORALLY DISINTEGRATING ORAL NIGHTLY
Qty: 30 TABLET | Refills: 3 | Status: SHIPPED | OUTPATIENT
Start: 2023-08-30 | End: 2023-09-08

## 2023-08-30 RX ORDER — NORTRIPTYLINE HYDROCHLORIDE 25 MG/1
25 CAPSULE ORAL NIGHTLY
Qty: 30 CAPSULE | Refills: 3 | Status: SHIPPED | OUTPATIENT
Start: 2023-08-30 | End: 2023-09-03 | Stop reason: SDUPTHER

## 2023-08-30 RX ORDER — DEXTROSE MONOHYDRATE 100 MG/ML
INJECTION, SOLUTION INTRAVENOUS CONTINUOUS PRN
Status: DISCONTINUED | OUTPATIENT
Start: 2023-08-30 | End: 2023-08-30 | Stop reason: HOSPADM

## 2023-08-30 RX ORDER — SUCRALFATE 1 G/1
1 TABLET ORAL 4 TIMES DAILY
Qty: 120 TABLET | Refills: 0 | Status: SHIPPED | OUTPATIENT
Start: 2023-08-30 | End: 2023-09-08

## 2023-08-30 RX ADMIN — DULOXETINE HYDROCHLORIDE 20 MG: 20 CAPSULE, DELAYED RELEASE ORAL at 08:35

## 2023-08-30 RX ADMIN — PANTOPRAZOLE SODIUM 40 MG: 40 TABLET, DELAYED RELEASE ORAL at 17:25

## 2023-08-30 RX ADMIN — APIXABAN 5 MG: 5 TABLET, FILM COATED ORAL at 08:35

## 2023-08-30 ASSESSMENT — PAIN SCALES - GENERAL: PAINLEVEL_OUTOF10: 2

## 2023-08-30 NOTE — CONSULTS
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 Further imaging workup negative. Pain persists despite supportive care. PLAN:  - Continue PPI and Bentyl.  - Carafate PRN. - Begin nortriptyline. - Await HIDA scan results. - Will plan for EGD/EUS and possible celiac plexus block on Friday. We will follow. Thank you for including us in the care of this patient. Please do not hesitate to contact us with any additional questions or concerns. Nikia Hendrickson MD  Gastroenterology/Hepatology  Advanced Endoscopy        HISTORY OF PRESENT ILLNESS:      Ms. Kayla Mcgill is a 68y/F w/ history of anxiety who presents to the ED w/ abdominal pain. The pain is described as as cramping, spasm-type pain in her upper quadrants and epigastrium. The pain sometimes worsens with PO intake though she also reports bland foods seem to help the pain. She reports having tried multiple medications including PPI and Bentyl with no relief. She also reports having normal bowel movements regularly. Vitals: HDS and afebrile. Labwork personally reviewed: Na 137, Cr 0.7, Tbili 0.4, Hgb 12.2. Imaging personally reviewed: CT A/P 8/16 normal with mild diverticulosis noted. CTA A/P 8/18 showed PVT and splenic vein thrombus and the patient was started on Eliquis. MRI Abd overall normal. CT A/P on admission overall normal with no inflammatory findings. She is scheduled for HIDA scan tomorrow. She continues to have significant abdominal pain.      Past Medical History:        Diagnosis Date    Abnormal Pap smear of cervix     Anxiety     Detached retina, right 08/17/2010,10/29/10    surgery, Dr. Torsten [facility]      Past Surgical History:        Procedure Laterality Date    CATARACT REMOVAL  07/17/11    also had scar tissue removed at this time from rashes or jaundice  Neuro: A&O x 3, CN grossly intact, non-focal exam               Electronically signed by Nikia Hendrickson MD on 8/30/2023 at 9:29 AM

## 2023-08-30 NOTE — CARE COORDINATION
Social Work/Case Management Transition of Care Planning (Kaylah Miguel [address] [facility] [phone]): Per report and chart review, patient has not completed HIDA scan as of yet. She refused it yesterday and  is refusing to go to have the HIDA scan again today because she wants pain meds which cannot be given prior to the scan. General surgery indicated with patient's refusal to have the HIDA scan there is not much they can offer. GI was consulted to evaluate for possible EGD. Met with patient at bedside. She indicated she wants the HIDA scan cancelled as she has no intention of doing it. Notified charge nurse. Discharge plan is to home with no needs.  will provide transport. CM/SW will follow. DUGLAS Gutierres  8/30/2023    Update:  Discharge order noted. Met with patient at bedside with nurse. Explained her EGD that is supposed to take place on Friday can be done as an outpatient. Patient indicated her  will provide transport home.   DUGLAS Gutierres  8/30/2023

## 2023-08-30 NOTE — DISCHARGE SUMMARY
Internal Medicine Discharge Summary    NAME: Kayla Mcgill :  1955  MRN:  [id number] PCP:Carrie Dumont DO    ADMITTED: 2023   DISCHARGED: 2023  7:14 PM    ADMITTING PHYSICIAN: Nilam att. providers found    PCP: Emilee Dumont DO    CONSULTANT(S):   IP CONSULT TO SOCIAL WORK  IP CONSULT TO GENERAL SURGERY  IP CONSULT TO PSYCHIATRY  IP CONSULT TO GI     ADMITTING DIAGNOSIS:   Dizziness [R42]  Unable to ambulate [R26.2]  Cystitis without hematuria [N30.90]  Acute superficial gastritis without hemorrhage [K29.00]  Left upper quadrant abdominal pain [R10.12]     Please see H&P for further details    DISCHARGE DIAGNOSES:   Active Hospital Problems    Diagnosis     Left upper quadrant abdominal pain [R10.12]     Intractable abdominal pain [R10.9]        BRIEF HISTORY OF PRESENT ILLNESS: Kayla Mcgill is a 79 y.o. female patient of Carrie Dumont DO who  has a past medical history of Abnormal Pap smear of cervix, Anxiety, and Detached retina, right. who originally had concerns including Abdominal Pain (LUQ pain, ongoing issue). at presentation on 2023, and was found to have Dizziness [R42]  Unable to ambulate [R26.2]  Cystitis without hematuria [N30.90]  Acute superficial gastritis without hemorrhage [K29.00]  Left upper quadrant abdominal pain [R10.12] after workup. Please see H&P for further details. HOSPITAL COURSE:   The patient presented to the hospital with the chief complaint of Abdominal Pain (LUQ pain, ongoing issue)  .  The patient was admitted to the hospital.     Abdominal pain  CT a/p 23 no acute process   HIDA scan 23 ordered   Refused HIDA multiple times   Will ask GI to evaluate for EGD 30 this can probably be done on an op basis   Spoke to Dr Niki Dominguez, follow up op as per him Friday op EGD      Depression and anxiety   Psych has seen the patient with the following recommendations made   Would benefit from inpatient psychiatric hospitalization to fully Support Exception - unselect if not a suspected or confirmed emergency medical condition->Emergency Medical Condition (MA) FINDINGS: Lower Chest:  Visualized portion of the lower chest demonstrates no acute abnormality. Organs: There is focal fat deposition along either side of the falciform ligament. The main portal vein is patent. Previously seen nonocclusive portal vein thrombus is not confidently identified on today study. The gallbladder, spleen, pancreas, adrenals, and kidneys are unremarkable. GI/Bowel: There is no evidence of bowel obstruction. No evidence of abnormal bowel wall thickening or distension. The appendix is normal.  There is diverticulosis. Pelvis: The urinary bladder is partially filled. The uterus is unremarkable. Peritoneum/Retroperitoneum: No evidence of ascites or free air. No evidence of lymphadenopathy. Aorta is normal in caliber. Bones/Soft Tissues:  No acute abnormality of the visualized osseous structures. There is diffuse decreased bone density. No acute abdominopelvic abnormality. Interval resolution of portal vein thrombus. CT ABDOMEN PELVIS W IV CONTRAST Additional Contrast? None    Result Date: 8/16/2023  EXAMINATION: CT OF THE ABDOMEN AND PELVIS WITH CONTRAST 8/16/2023 2:52 pm TECHNIQUE: CT of the abdomen and pelvis was performed with the administration of intravenous contrast. Multiplanar reformatted images are provided for review. Automated exposure control, iterative reconstruction, and/or weight based adjustment of the mA/kV was utilized to reduce the radiation dose to as low as reasonably achievable. COMPARISON: 08/14/2023 HISTORY: ORDERING SYSTEM PROVIDED HISTORY: abdominal pain TECHNOLOGIST PROVIDED HISTORY: Reason for exam:->abdominal pain Additional Contrast?->None Decision Support Exception - unselect if not a suspected or confirmed emergency medical condition->Emergency Medical Condition (MA) FINDINGS: Lower Chest: Unremarkable Organs:  There is a

## 2023-08-30 NOTE — PROGRESS NOTES
Went through discharge paper work with patient. Patient is refusing to take duloxetine, Mirtazapine, and nortriptyline prescriptions. Dr. Rodríguez Odom made aware. No further questions or comments about discharge per patient.
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
## 2023-08-31 ENCOUNTER — TELEPHONE (OUTPATIENT)
Age: 68
End: 2023-08-31

## 2023-08-31 ENCOUNTER — CARE COORDINATION (OUTPATIENT)
Dept: CARE COORDINATION | Age: 68
End: 2023-08-31

## 2023-08-31 ENCOUNTER — TELEPHONE (OUTPATIENT)
Dept: FAMILY MEDICINE CLINIC | Age: 68
End: 2023-08-31

## 2023-08-31 NOTE — TELEPHONE ENCOUNTER
Gwendolyn Perea called again when I was abut to call them. States they want you (Dr Severino Garcia) to personally call them. Please Advise.

## 2023-08-31 NOTE — CARE COORDINATION
[facility] Care Transitions Initial Follow Up Call    Call within 2 business days of discharge: Yes    -Phone answered by patient's  Imelda Jarquin (on communication release of information form)  for initial care transition call post hospital discharge.    -Patient was a readmission to [facility] ([facility]) on 23 with LUQ pain which has been ongoing. Also having some dizziness. Patient advised by PCP to return to ER.  -Patient's  reports no improvement with his wife and he is waiting on a return call from PCP office. CTN requested return call later on today when able. Patient's  is agreeable, CTN provided contact information to Imelda Jarquin. -CTN will await return call from patient's . Patient: Bushra Thompson Patient : 1955   MRN: [id number]  Reason for Admission: intractable abdominal pain  Discharge Date: 23 RARS: Readmission Risk Score: 21.7      Last Discharge [address] [facility] [facility]       Date Complaint Diagnosis Description Type Department Provider    23 Abdominal Pain Left upper quadrant abdominal pain . .. ED to Hosp-Admission (Discharged) (ADMIT) ARMEN Ardon DO; Sirisha Wylie...                 Follow Up  Future Appointments   Date Time Provider [facility] [address]   11/15/2023 10:30 AM DO Jay Jay Lin [facility]         Tamara Gonsalves RN

## 2023-08-31 NOTE — TELEPHONE ENCOUNTER
Pt d/c from hospital today and per dr Jose Farrell sh is to be scheduled for outpatient EGD/EUS with celiac plexus block. Called and received insurance information and no prior auth needed. PAT called patient and she told them she was to sick. Our office tried calling her on two separate occasions to explain that the procedure would help her with the pain. She would  the phone and then hang it up. Will cancel her procedure for tomorrow.  Electronically signed by Man Fitzgerald LPN on 7/64/7973 at 6:15 PM

## 2023-08-31 NOTE — TELEPHONE ENCOUNTER
Mirna Toledo called stating Renato Hernandez just got discharged, but states Renato Hernandez is still sick. Wants her to be seen by doctor. I just do not know where to put her. Please Advise.

## 2023-08-31 NOTE — TELEPHONE ENCOUNTER
Called [facility] for prior auth on procedure 9-1-23  EGD/ EUS with celiac plexus block    Per Esther Penn.  No prior Dorena Finders is required for this procedure, codes [phone], [phone]    Ref [id number]    Electronically signed by Adri Hodge LPN on 3/45/4505 at 5:21 PM [phone]

## 2023-08-31 NOTE — PROGRESS NOTES
CLINICAL PHARMACY NOTE: MEDS TO BEDS    Total # of Prescriptions Filled: 2   The following medications were delivered to the patient:  Sucralfate 1gm tablets  Pantoprazole sodium 40 mg  Pt did not want nortriptyline, mirtazapine & duloxitine    Additional Documentation:   Delivered to pt

## 2023-09-01 ENCOUNTER — CARE COORDINATION (OUTPATIENT)
Dept: CARE COORDINATION | Age: 68
End: 2023-09-01

## 2023-09-01 DIAGNOSIS — R10.9 INTRACTABLE ABDOMINAL PAIN: Primary | ICD-10-CM

## 2023-09-01 PROCEDURE — 1111F DSCHRG MED/CURRENT MED MERGE: CPT | Performed by: FAMILY MEDICINE

## 2023-09-01 NOTE — CARE COORDINATION
-CTN phoned GI office(Dr Hendricks)and spoke to Grady. Grady states there is no procedure scheduled for tomorrow for patient. CTN to phone patient back with contact information to GI office to reschedule.
-Patient phoned CTN. CTN informed patient she has no procedure scheduled for tomorrow and provided patient contact information to call GI office to schedule procedure.  -Patient now requesting CTN to add request for ativan to be routed to PCP.   Patient states she had discussed addition of ativan with PCP in the past.
she has not taken again. Patient is open to trying phenergan per rectum. -Patient is under the impression that her EGD ultrasound with Dr Keerthi Benson is scheduled for tomorrow(Saturday 9/2/23 at 12:30 pm.)  CTN to call office to clarify.  -Patient denies any further needs at this time and is agreeable to continued follow up from CTN. Care Transition Nurse reviewed discharge instructions, medical action plan, and red flags with patient who verbalized understanding. The patient was given an opportunity to ask questions and does not have any further questions at this time. Were discharge instructions available to patient? Yes. Reviewed appropriate site of care based on symptoms and resources available to patient including: PCP  Specialist  When to return to ER . The patient agrees to contact the PCP office for questions related to their healthcare. Advance Care Planning:   Does patient have an Advance Directive: decision maker reviewed and current. Medication reconciliation was performed with patient, who verbalizes understanding of administration of home medications. Patient refused new medications of cymbalta/remeron/pamelor at hospital discharge. Patient currently only able to take her eliquis d/t her GI symptoms. Medications reviewed, 1111F entered: yes    Was patient discharged with a pulse oximeter? no    Non-face-to-face services provided:  Scheduled appointment with PCP-CTN will inform PCP of need for TCM/HFU appointment when routing regarding phenergan medication.   Scheduled appointment with Mark Keene was to have EGD ultrasound today but cancelled  Obtained and reviewed discharge summary and/or continuity of care documents    Offered patient enrollment in the Remote Patient Monitoring (RPM) program for in-home monitoring: NA, patient has no underlying conditions to warrant  RPM.    Care Transitions 24 Hour Call    Do you have a copy of your discharge instructions?: Yes  Have you been

## 2023-09-02 ENCOUNTER — TELEPHONE (OUTPATIENT)
Dept: FAMILY MEDICINE CLINIC | Age: 68
End: 2023-09-02

## 2023-09-02 DIAGNOSIS — F41.9 ANXIETY: Primary | ICD-10-CM

## 2023-09-02 PROBLEM — K55.069 SUPERIOR MESENTERIC VEIN THROMBOSIS (HCC): Status: ACTIVE | Noted: 2023-09-02

## 2023-09-02 RX ORDER — LORAZEPAM 0.5 MG/1
0.5 TABLET ORAL EVERY 8 HOURS PRN
Qty: 30 TABLET | Refills: 2 | Status: SHIPPED | OUTPATIENT
Start: 2023-09-02 | End: 2023-10-02

## 2023-09-02 RX ORDER — PROMETHAZINE HYDROCHLORIDE 25 MG/1
25 SUPPOSITORY RECTAL EVERY 6 HOURS PRN
Qty: 30 SUPPOSITORY | Refills: 1 | Status: SHIPPED
Start: 2023-09-02 | End: 2023-09-03 | Stop reason: SDUPTHER

## 2023-09-02 RX ORDER — PROMETHAZINE HYDROCHLORIDE 25 MG/1
25 TABLET ORAL 4 TIMES DAILY PRN
Qty: 30 TABLET | Refills: 2 | Status: SHIPPED | OUTPATIENT
Start: 2023-09-02 | End: 2023-10-02

## 2023-09-03 RX ORDER — NORTRIPTYLINE HYDROCHLORIDE 25 MG/1
25 CAPSULE ORAL NIGHTLY
Qty: 30 CAPSULE | Refills: 3 | Status: SHIPPED | OUTPATIENT
Start: 2023-09-03

## 2023-09-03 RX ORDER — PROMETHAZINE HYDROCHLORIDE 25 MG/1
25 SUPPOSITORY RECTAL EVERY 6 HOURS PRN
Qty: 30 SUPPOSITORY | Refills: 1 | Status: SHIPPED | OUTPATIENT
Start: 2023-09-03 | End: 2023-09-18

## 2023-09-03 RX ORDER — PROMETHAZINE HYDROCHLORIDE 25 MG/1
25 SUPPOSITORY RECTAL EVERY 6 HOURS PRN
Qty: 30 SUPPOSITORY | Refills: 1 | Status: CANCELLED | OUTPATIENT
Start: 2023-09-03 | End: 2023-09-18

## 2023-09-05 NOTE — PROGRESS NOTES
Physician Progress Note      PATIENT:               Claudia CHANDRA #:                  [id number]  :                       1955  ADMIT DATE:       2023 5:51 PM  [address] [facility] DATE:        2023 6:27 PM  RESPONDING  PROVIDER #: [facility] DO          QUERY TEXT:    Pt admitted with Abdominal pain. Noted documentation of SMV thrombosis in H&P   and all progress notes through discharge with no clear acuity verification. If   possible, please document in progress notes and discharge summary:      The medical record reflects the following:  Risk Factors: PV Thrombosis  Clinical Indicators: Per documentation from hematology: \". Felipe Number Felipe Number She had a CTA of   the abdomen with was consistent with a filling defect in the PV with extension   into the SMV, consistent with PVT. Felipe Number Felipe Number \", documentation from progress note re:   radiology read: \". Felipe Number Felipe Number Limited visualization of the more extensive SMV thrombus   is also again seen. Felipe Number Felipe Number \", CTA abdomen : \". Felipe Number Felipe Number Filling defect within the portal   vein and superior mesenteric vein suggesting thrombus. This appearance is new   when compared to the previous CT exam.. Felipe Number \"  Treatment: Heparin gtt, Pain control    Thank you,  Crystal EDWARDS, RN, CRCR  Clinical Documentation Improvement  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     Query created by: Luanne Lauren on 2023 9:18 AM      Electronically signed by:  [facility] DO 2023 9:54 AM

## 2023-09-06 ENCOUNTER — CARE COORDINATION (OUTPATIENT)
Dept: CARE COORDINATION | Age: 68
End: 2023-09-06

## 2023-09-06 NOTE — PROGRESS NOTES
Physician Progress Note      PATIENT:               Liam Puga  CSN #:                  [id number]  :                       1955  ADMIT DATE:       2023 5:14 PM  [address] [facility] DATE:        2023 7:14 PM  RESPONDING  PROVIDER #:        Charity Chopra MD          QUERY TEXT:    Pt admitted with Abdominal Pain. Pt noted to have Acute Gastritis per   Discharge Summary. If possible, please document in progress notes and   discharge summary the relationship, if any, between Abdominal Pain and Acute   Gastritis. The medical record reflects the following:  Risk Factors: Recent portal vein thrombosis; UTI; Lesion of ovary  Clinical Indicators:  23: D/C Summary: Acute Superficial Gastritis without hemorrhage; Left   upper quadrant abdominal Pain; cystitis without hematuria. Treatment: Protonix; General Surgery consult; IV fluids; Laboratory studies;   GI cocktail; Iv fluid bolus; imaging; Rocephin IV    Thank You,  Agatha GOMESN, R.N.  Clinical Documentation Improvement  [phone]  Options provided:  -- Abdominal Pain due to acute gastritis  -- Abdominal Pain due to other, please specify cause. -- Other - I will add my own diagnosis  -- Disagree - Not applicable / Not valid  -- Disagree - Clinically unable to determine / Unknown  -- Refer to Clinical Documentation Reviewer    PROVIDER RESPONSE TEXT:    This patient has Abdominal Pain due to acute Gastritis.     Query created by: Ranjan Sanchez on 2023 9:02 AM      Electronically signed by:  Charity Chopra MD 2023 11:03 AM

## 2023-09-06 NOTE — CARE COORDINATION
[facility] Care Transitions Follow Up Call    Patient Current Location:  Home: [address] [place] [address]    Care Transition Nurse contacted the patient by telephone to follow up after admission on 23. Patient: Abimbola Cintron  Patient : 1955   MRN: [id number]  Reason for Admission: intractable abdominal pain  Discharge Date: 23 RARS: Readmission Risk Score: 21.7      Needs to be reviewed by the provider   Additional needs identified to be addressed with provider: No  none             Method of communication with provider: none.      -Spoke with patient for follow up care transition call.   -Patient states she had a pretty good day yesterday but has had return of abdominal discomfort off and on today. Feels the new medications ordered by PCP have been beneficial.  Tolerating fluids but still unable to eat solids. Feels she should be more weak than she is d/t not eating.  -Patient states she phoned the GI office(Dr Hendricks)and was told they didn't feel she is ready for the procedure d/t her episodes of \"gagging. \"  Patient states she was told to call office back when she was improved and then procedure would be scheduled.  -Patient voiced someone told her  that she can no longer return to ER if needed because her insurance is not allowing it. Patient felt it was this CTN that told him that. CTN informed patient that I was not the one that stated that. Patient states she is going to have her  call her insurance. -CTN will continue to follow for care transitions. Addressed changes since last contact:  medications-pamelor, ativan, and phenergan r/s ordered by PCP. Patient phoned GI office(Dr Hendricks) to check on scheduling of EGD    Discussed follow-up appointments. If no appointment was previously scheduled, appointment scheduling offered: n/a. Is follow up appointment scheduled within 7 days of discharge?  No.  Patient's  has been in contact with PCP office regarding

## 2023-09-08 ENCOUNTER — HOSPITAL ENCOUNTER (EMERGENCY)
Age: 68
Discharge: HOME | End: 2023-09-08
Attending: EMERGENCY MEDICINE
Payer: MEDICARE

## 2023-09-08 ENCOUNTER — APPOINTMENT (OUTPATIENT)
Dept: CT IMAGING | Age: 68
End: 2023-09-08
Payer: MEDICARE

## 2023-09-08 VITALS
HEART RATE: 72 BPM | WEIGHT: 101 LBS | HEIGHT: 62 IN | BODY MASS INDEX: 18.58 KG/M2 | RESPIRATION RATE: 14 BRPM | DIASTOLIC BLOOD PRESSURE: 70 MMHG | TEMPERATURE: 98 F | SYSTOLIC BLOOD PRESSURE: 108 MMHG | OXYGEN SATURATION: 100 %

## 2023-09-08 DIAGNOSIS — K59.00 CONSTIPATION, UNSPECIFIED CONSTIPATION TYPE: ICD-10-CM

## 2023-09-08 DIAGNOSIS — R10.84 GENERALIZED ABDOMINAL PAIN: ICD-10-CM

## 2023-09-08 DIAGNOSIS — R42 DIZZINESS: Primary | ICD-10-CM

## 2023-09-08 LAB
ALBUMIN SERPL-MCNC: 4.8 G/DL (ref 3.5–5.2)
ALP SERPL-CCNC: 81 U/L (ref 35–104)
ALT SERPL-CCNC: 15 U/L (ref 0–32)
ANION GAP SERPL CALCULATED.3IONS-SCNC: 17 MMOL/L (ref 7–16)
AST SERPL-CCNC: 23 U/L (ref 0–31)
BASOPHILS # BLD: 0.04 K/UL (ref 0–0.2)
BASOPHILS NFR BLD: 0 % (ref 0–2)
BILIRUB DIRECT SERPL-MCNC: <0.2 MG/DL (ref 0–0.3)
BILIRUB INDIRECT SERPL-MCNC: NORMAL MG/DL (ref 0–1)
BILIRUB SERPL-MCNC: 0.4 MG/DL (ref 0–1.2)
BUN SERPL-MCNC: 32 MG/DL (ref 6–23)
CALCIUM SERPL-MCNC: 9.8 MG/DL (ref 8.6–10.2)
CHLORIDE SERPL-SCNC: 99 MMOL/L (ref 98–107)
CO2 SERPL-SCNC: 22 MMOL/L (ref 22–29)
CREAT SERPL-MCNC: 0.9 MG/DL (ref 0.5–1)
EKG ATRIAL RATE: 90 BPM
EKG P AXIS: 67 DEGREES
EKG P-R INTERVAL: 118 MS
EKG Q-T INTERVAL: 360 MS
EKG QRS DURATION: 72 MS
EKG QTC CALCULATION (BAZETT): 440 MS
EKG R AXIS: 77 DEGREES
EKG T AXIS: 45 DEGREES
EKG VENTRICULAR RATE: 90 BPM
EOSINOPHIL # BLD: 0.01 K/UL (ref 0.05–0.5)
EOSINOPHILS RELATIVE PERCENT: 0 % (ref 0–6)
ERYTHROCYTE [DISTWIDTH] IN BLOOD BY AUTOMATED COUNT: 13.4 % (ref 11.5–15)
GFR SERPL CREATININE-BSD FRML MDRD: >60 ML/MIN/1.73M2
GLUCOSE BLD-MCNC: 119 MG/DL (ref 74–99)
GLUCOSE SERPL-MCNC: 106 MG/DL (ref 74–99)
HCT VFR BLD AUTO: 40.8 % (ref 34–48)
HGB BLD-MCNC: 14.1 G/DL (ref 11.5–15.5)
IMM GRANULOCYTES # BLD AUTO: 0.05 K/UL (ref 0–0.58)
IMM GRANULOCYTES NFR BLD: 0 % (ref 0–5)
LACTATE BLDV-SCNC: 1.7 MMOL/L (ref 0.5–2.2)
LIPASE SERPL-CCNC: 25 U/L (ref 13–60)
LYMPHOCYTES NFR BLD: 0.89 K/UL (ref 1.5–4)
LYMPHOCYTES RELATIVE PERCENT: 8 % (ref 20–42)
MAGNESIUM SERPL-MCNC: 2.1 MG/DL (ref 1.6–2.6)
MCH RBC QN AUTO: 30.7 PG (ref 26–35)
MCHC RBC AUTO-ENTMCNC: 34.6 G/DL (ref 32–34.5)
MCV RBC AUTO: 88.7 FL (ref 80–99.9)
MONOCYTES NFR BLD: 0.91 K/UL (ref 0.1–0.95)
MONOCYTES NFR BLD: 8 % (ref 2–12)
NEUTROPHILS NFR BLD: 84 % (ref 43–80)
NEUTS SEG NFR BLD: 9.88 K/UL (ref 1.8–7.3)
PLATELET # BLD AUTO: 317 K/UL (ref 130–450)
PMV BLD AUTO: 10.8 FL (ref 7–12)
POTASSIUM SERPL-SCNC: 3.9 MMOL/L (ref 3.5–5)
PROT SERPL-MCNC: 7.2 G/DL (ref 6.4–8.3)
RBC # BLD AUTO: 4.6 M/UL (ref 3.5–5.5)
SODIUM SERPL-SCNC: 138 MMOL/L (ref 132–146)
TROPONIN I SERPL HS-MCNC: 12 NG/L (ref 0–9)
TROPONIN I SERPL HS-MCNC: 13 NG/L (ref 0–9)
TSH SERPL DL<=0.05 MIU/L-ACNC: 2.22 UIU/ML (ref 0.27–4.2)
WBC OTHER # BLD: 11.8 K/UL (ref 4.5–11.5)

## 2023-09-08 PROCEDURE — 80053 COMPREHEN METABOLIC PANEL: CPT

## 2023-09-08 PROCEDURE — 6360000002 HC RX W HCPCS

## 2023-09-08 PROCEDURE — 93010 ELECTROCARDIOGRAM REPORT: CPT | Performed by: INTERNAL MEDICINE

## 2023-09-08 PROCEDURE — 6370000000 HC RX 637 (ALT 250 FOR IP)

## 2023-09-08 PROCEDURE — 83735 ASSAY OF MAGNESIUM: CPT

## 2023-09-08 PROCEDURE — 83690 ASSAY OF LIPASE: CPT

## 2023-09-08 PROCEDURE — 74177 CT ABD & PELVIS W/CONTRAST: CPT

## 2023-09-08 PROCEDURE — 96374 THER/PROPH/DIAG INJ IV PUSH: CPT

## 2023-09-08 PROCEDURE — 84484 ASSAY OF TROPONIN QUANT: CPT

## 2023-09-08 PROCEDURE — 6360000004 HC RX CONTRAST MEDICATION: Performed by: RADIOLOGY

## 2023-09-08 PROCEDURE — 93005 ELECTROCARDIOGRAM TRACING: CPT

## 2023-09-08 PROCEDURE — 82962 GLUCOSE BLOOD TEST: CPT

## 2023-09-08 PROCEDURE — 99285 EMERGENCY DEPT VISIT HI MDM: CPT

## 2023-09-08 PROCEDURE — 84443 ASSAY THYROID STIM HORMONE: CPT

## 2023-09-08 PROCEDURE — 85025 COMPLETE CBC W/AUTO DIFF WBC: CPT

## 2023-09-08 PROCEDURE — 83605 ASSAY OF LACTIC ACID: CPT

## 2023-09-08 PROCEDURE — 82248 BILIRUBIN DIRECT: CPT

## 2023-09-08 RX ORDER — MORPHINE SULFATE 4 MG/ML
4 INJECTION, SOLUTION INTRAMUSCULAR; INTRAVENOUS ONCE
Status: COMPLETED | OUTPATIENT
Start: 2023-09-08 | End: 2023-09-08

## 2023-09-08 RX ORDER — MECLIZINE HCL 12.5 MG/1
25 TABLET ORAL ONCE
Status: COMPLETED | OUTPATIENT
Start: 2023-09-08 | End: 2023-09-08

## 2023-09-08 RX ORDER — POLYETHYLENE GLYCOL 3350 17 G/17G
17 POWDER, FOR SOLUTION ORAL DAILY PRN
Qty: 510 G | Refills: 0 | Status: SHIPPED | OUTPATIENT
Start: 2023-09-08 | End: 2023-10-08

## 2023-09-08 RX ORDER — DOCUSATE SODIUM 100 MG/1
100 CAPSULE, LIQUID FILLED ORAL 2 TIMES DAILY
Qty: 60 CAPSULE | Refills: 0 | Status: SHIPPED | OUTPATIENT
Start: 2023-09-08 | End: 2023-10-08

## 2023-09-08 RX ORDER — DICYCLOMINE HYDROCHLORIDE 10 MG/1
10 CAPSULE ORAL 4 TIMES DAILY
Qty: 56 CAPSULE | Refills: 0 | Status: SHIPPED | OUTPATIENT
Start: 2023-09-08 | End: 2023-09-22

## 2023-09-08 RX ADMIN — MORPHINE SULFATE 4 MG: 4 INJECTION, SOLUTION INTRAMUSCULAR; INTRAVENOUS at 18:30

## 2023-09-08 RX ADMIN — MECLIZINE 25 MG: 12.5 TABLET ORAL at 18:31

## 2023-09-08 RX ADMIN — IOPAMIDOL 75 ML: 755 INJECTION, SOLUTION INTRAVENOUS at 17:30

## 2023-09-08 ASSESSMENT — PAIN DESCRIPTION - ONSET: ONSET: ON-GOING

## 2023-09-08 ASSESSMENT — PAIN DESCRIPTION - DESCRIPTORS: DESCRIPTORS: ACHING

## 2023-09-08 ASSESSMENT — PAIN DESCRIPTION - ORIENTATION: ORIENTATION: MID

## 2023-09-08 ASSESSMENT — PAIN DESCRIPTION - LOCATION
LOCATION: ABDOMEN
LOCATION: ABDOMEN

## 2023-09-08 ASSESSMENT — PAIN - FUNCTIONAL ASSESSMENT: PAIN_FUNCTIONAL_ASSESSMENT: ACTIVITIES ARE NOT PREVENTED

## 2023-09-08 ASSESSMENT — PAIN SCALES - GENERAL: PAINLEVEL_OUTOF10: 10

## 2023-09-08 ASSESSMENT — PAIN DESCRIPTION - FREQUENCY: FREQUENCY: CONTINUOUS

## 2023-09-08 NOTE — ED NOTES
Assumed care of patient from Dr. Jeb Hyde. Please refer to Dr. Marjan Bennett note for full history and physical.  Presented with dizziness, nausea, difficulty ambulation, for a month history of multiple admissions for similar complaints. Workup thus far negative, other than mild elevated troponin at 12 repeat already ordered. Pending at this time is imaging. Course under my care:    ED Course as of 09/09/23 1805   Fri Sep 08, 2023   1307 EKG: This EKG is signed and interpreted by me. Rate: 90  Rhythm: Sinus rhythm with occasional PVCs  Interpretation: Normal sinus, normal axis, normal MD interval, normal QRS duration, no QT prolongation, no acute ST changes  Comparison: stable as compared to patient's most recent EKG   [AH]   1730 Patient be signed out to oncoming resident physician. Pending at this time his repeat troponin is initial one is 12. Patient's labs otherwise are normal and within normal ranges. This is with the exception of a slight leukocytosis 11.8 with a neutrophil percentage of 84. Pending at this time is imaging prior to disposition. Patient is ordered morphine and meclizine for pain and dizziness. [AH]   2027 CT ABDOMEN PELVIS W IV CONTRAST Additional Contrast? None  IMPRESSION:  Diffuse colonic fecal retention with significant stool burden. Dependent debris within an otherwise normal-appearing gallbladder. [PW]   2051 Spoke to patient by results, evidence of constipation on CT abdomen. Prescription for Colace, MiraLAX and Bentyl were given. They were amenable to this plan. Patient discharged home.  [PW]      ED Course User Index  [AH] Andriy Jimenez MD  [PW] DO Samantha Keyes DO  Resident  09/09/23 1515

## 2023-09-08 NOTE — ED NOTES
Received report from Iris BUSBY.  Assumed care of pt at this time     Saba Campbell RN  09/08/23 8128

## 2023-09-09 ENCOUNTER — HOSPITAL ENCOUNTER (EMERGENCY)
Age: 68
Discharge: HOME | End: 2023-09-09
Attending: STUDENT IN AN ORGANIZED HEALTH CARE EDUCATION/TRAINING PROGRAM
Payer: MEDICARE

## 2023-09-09 ENCOUNTER — APPOINTMENT (OUTPATIENT)
Dept: GENERAL RADIOLOGY | Age: 68
End: 2023-09-09
Payer: MEDICARE

## 2023-09-09 VITALS
HEART RATE: 78 BPM | OXYGEN SATURATION: 99 % | SYSTOLIC BLOOD PRESSURE: 119 MMHG | TEMPERATURE: 98.3 F | DIASTOLIC BLOOD PRESSURE: 61 MMHG | RESPIRATION RATE: 18 BRPM

## 2023-09-09 DIAGNOSIS — K59.00 CONSTIPATION, UNSPECIFIED CONSTIPATION TYPE: Primary | ICD-10-CM

## 2023-09-09 LAB
ALBUMIN SERPL-MCNC: 4.6 G/DL (ref 3.5–5.2)
ALP SERPL-CCNC: 74 U/L (ref 35–104)
ALT SERPL-CCNC: 17 U/L (ref 0–32)
ANION GAP SERPL CALCULATED.3IONS-SCNC: 17 MMOL/L (ref 7–16)
AST SERPL-CCNC: 33 U/L (ref 0–31)
BACTERIA URNS QL MICRO: ABNORMAL
BASOPHILS # BLD: 0.06 K/UL (ref 0–0.2)
BASOPHILS NFR BLD: 1 % (ref 0–2)
BILIRUB SERPL-MCNC: 0.5 MG/DL (ref 0–1.2)
BILIRUB UR QL STRIP: ABNORMAL
BUN SERPL-MCNC: 27 MG/DL (ref 6–23)
CALCIUM SERPL-MCNC: 9.6 MG/DL (ref 8.6–10.2)
CHLORIDE SERPL-SCNC: 96 MMOL/L (ref 98–107)
CLARITY UR: CLEAR
CO2 SERPL-SCNC: 22 MMOL/L (ref 22–29)
COLOR UR: YELLOW
CREAT SERPL-MCNC: 0.8 MG/DL (ref 0.5–1)
EKG ATRIAL RATE: 82 BPM
EKG P AXIS: 79 DEGREES
EKG P-R INTERVAL: 108 MS
EKG Q-T INTERVAL: 368 MS
EKG QRS DURATION: 72 MS
EKG QTC CALCULATION (BAZETT): 429 MS
EKG R AXIS: 83 DEGREES
EKG T AXIS: 40 DEGREES
EKG VENTRICULAR RATE: 82 BPM
EOSINOPHIL # BLD: 0.04 K/UL (ref 0.05–0.5)
EOSINOPHILS RELATIVE PERCENT: 0 % (ref 0–6)
ERYTHROCYTE [DISTWIDTH] IN BLOOD BY AUTOMATED COUNT: 13.6 % (ref 11.5–15)
GFR SERPL CREATININE-BSD FRML MDRD: >60 ML/MIN/1.73M2
GLUCOSE SERPL-MCNC: 115 MG/DL (ref 74–99)
GLUCOSE UR STRIP-MCNC: NEGATIVE MG/DL
HCT VFR BLD AUTO: 41.2 % (ref 34–48)
HGB BLD-MCNC: 14.3 G/DL (ref 11.5–15.5)
HGB UR QL STRIP.AUTO: ABNORMAL
IMM GRANULOCYTES # BLD AUTO: <0.03 K/UL (ref 0–0.58)
IMM GRANULOCYTES NFR BLD: 0 % (ref 0–5)
KETONES UR STRIP-MCNC: >80 MG/DL
LACTATE BLDV-SCNC: 1.4 MMOL/L (ref 0.5–2.2)
LEUKOCYTE ESTERASE UR QL STRIP: NEGATIVE
LIPASE SERPL-CCNC: 26 U/L (ref 13–60)
LYMPHOCYTES NFR BLD: 1 K/UL (ref 1.5–4)
LYMPHOCYTES RELATIVE PERCENT: 10 % (ref 20–42)
MCH RBC QN AUTO: 30.6 PG (ref 26–35)
MCHC RBC AUTO-ENTMCNC: 34.7 G/DL (ref 32–34.5)
MCV RBC AUTO: 88.2 FL (ref 80–99.9)
MONOCYTES NFR BLD: 0.72 K/UL (ref 0.1–0.95)
MONOCYTES NFR BLD: 8 % (ref 2–12)
MUCOUS THREADS URNS QL MICRO: PRESENT
NEUTROPHILS NFR BLD: 81 % (ref 43–80)
NEUTS SEG NFR BLD: 7.79 K/UL (ref 1.8–7.3)
NITRITE UR QL STRIP: NEGATIVE
PH UR STRIP: 6 [PH] (ref 5–9)
PLATELET # BLD AUTO: 324 K/UL (ref 130–450)
PMV BLD AUTO: 10.5 FL (ref 7–12)
POTASSIUM SERPL-SCNC: 4.6 MMOL/L (ref 3.5–5)
PROT SERPL-MCNC: 7.4 G/DL (ref 6.4–8.3)
PROT UR STRIP-MCNC: 30 MG/DL
RBC # BLD AUTO: 4.67 M/UL (ref 3.5–5.5)
RBC #/AREA URNS HPF: ABNORMAL /HPF
SODIUM SERPL-SCNC: 135 MMOL/L (ref 132–146)
SP GR UR STRIP: >1.03 (ref 1–1.03)
TROPONIN I SERPL HS-MCNC: 14 NG/L (ref 0–9)
UROBILINOGEN UR STRIP-ACNC: 0.2 EU/DL (ref 0–1)
WBC #/AREA URNS HPF: ABNORMAL /HPF
WBC OTHER # BLD: 9.6 K/UL (ref 4.5–11.5)

## 2023-09-09 PROCEDURE — 96374 THER/PROPH/DIAG INJ IV PUSH: CPT

## 2023-09-09 PROCEDURE — 6360000002 HC RX W HCPCS: Performed by: STUDENT IN AN ORGANIZED HEALTH CARE EDUCATION/TRAINING PROGRAM

## 2023-09-09 PROCEDURE — 83690 ASSAY OF LIPASE: CPT

## 2023-09-09 PROCEDURE — 84484 ASSAY OF TROPONIN QUANT: CPT

## 2023-09-09 PROCEDURE — 85025 COMPLETE CBC W/AUTO DIFF WBC: CPT

## 2023-09-09 PROCEDURE — 2580000003 HC RX 258: Performed by: STUDENT IN AN ORGANIZED HEALTH CARE EDUCATION/TRAINING PROGRAM

## 2023-09-09 PROCEDURE — 81001 URINALYSIS AUTO W/SCOPE: CPT

## 2023-09-09 PROCEDURE — 96372 THER/PROPH/DIAG INJ SC/IM: CPT

## 2023-09-09 PROCEDURE — 99285 EMERGENCY DEPT VISIT HI MDM: CPT

## 2023-09-09 PROCEDURE — 74018 RADEX ABDOMEN 1 VIEW: CPT

## 2023-09-09 PROCEDURE — 93005 ELECTROCARDIOGRAM TRACING: CPT | Performed by: STUDENT IN AN ORGANIZED HEALTH CARE EDUCATION/TRAINING PROGRAM

## 2023-09-09 PROCEDURE — 80053 COMPREHEN METABOLIC PANEL: CPT

## 2023-09-09 PROCEDURE — 83605 ASSAY OF LACTIC ACID: CPT

## 2023-09-09 RX ORDER — DICYCLOMINE HYDROCHLORIDE 10 MG/ML
20 INJECTION INTRAMUSCULAR ONCE
Status: COMPLETED | OUTPATIENT
Start: 2023-09-09 | End: 2023-09-09

## 2023-09-09 RX ORDER — 0.9 % SODIUM CHLORIDE 0.9 %
1000 INTRAVENOUS SOLUTION INTRAVENOUS ONCE
Status: COMPLETED | OUTPATIENT
Start: 2023-09-09 | End: 2023-09-09

## 2023-09-09 RX ORDER — PROCHLORPERAZINE EDISYLATE 5 MG/ML
10 INJECTION INTRAMUSCULAR; INTRAVENOUS ONCE
Status: COMPLETED | OUTPATIENT
Start: 2023-09-09 | End: 2023-09-09

## 2023-09-09 RX ORDER — 0.9 % SODIUM CHLORIDE 0.9 %
1000 INTRAVENOUS SOLUTION INTRAVENOUS ONCE
Status: DISCONTINUED | OUTPATIENT
Start: 2023-09-09 | End: 2023-09-09 | Stop reason: HOSPADM

## 2023-09-09 RX ADMIN — PROCHLORPERAZINE EDISYLATE 10 MG: 5 INJECTION INTRAMUSCULAR; INTRAVENOUS at 10:32

## 2023-09-09 RX ADMIN — SODIUM CHLORIDE 1000 ML: 9 INJECTION, SOLUTION INTRAVENOUS at 10:32

## 2023-09-09 RX ADMIN — DICYCLOMINE HYDROCHLORIDE 20 MG: 10 INJECTION, SOLUTION INTRAMUSCULAR at 10:33

## 2023-09-09 ASSESSMENT — ENCOUNTER SYMPTOMS
COUGH: 0
ABDOMINAL PAIN: 1
NAUSEA: 0
PHOTOPHOBIA: 0
SHORTNESS OF BREATH: 0
VOMITING: 0
ABDOMINAL DISTENTION: 0
CHEST TIGHTNESS: 0
DIARRHEA: 0

## 2023-09-09 NOTE — ED NOTES
Patient given discharge paperwork and verbalized understanding of discharge instructions, medications, and follow ups from today's visit.        Jessica Colmenares RN  09/08/23 6953

## 2023-09-09 NOTE — DISCHARGE INSTRUCTIONS
Please follow-up with your primary care doctor. Please return ED for new or worsening symptoms. Please increase hydration, try to add fiber to the diet.

## 2023-09-11 ENCOUNTER — CARE COORDINATION (OUTPATIENT)
Dept: CARE COORDINATION | Age: 68
End: 2023-09-11

## 2023-09-11 NOTE — CARE COORDINATION
[facility] Care Transitions Follow Up Call + ER follow up    Patient Current Location:  Home: [address] [place] [address] Dr Pacheco [place] [id number]    Care Transition Nurse contacted the patient by telephone to follow up after admission on 23 and for ER follow up. Patient: Jennifer Costa  Patient : 1955   MRN: [id number]  Reason for Admission: intractable abdominal pain  Discharge Date: 23 was date of ER visit, hospital discharge date was 23 RARS: Readmission Risk Score: 21.7      Needs to be reviewed by the provider   Additional needs identified to be addressed with provider: No  none             Method of communication with provider: none.      -Spoke with patient for follow up care transition call and ER follow up  -Patient to [facility] ER on 23 for dizziness, abdominal pain, N&V, and abnormal gait. Patient discharged to home on bentyl, colace, and glycolax. Patient returned again to [facility] ER on 23 with constipation, abdominal pain, and nausea. Patient had not started on any of the medications. Patient to schedule with PCP and GI as per AVS.  -Patient reports she thinks she had a BM on 23, states she is now taking ER medications as prescribed. CTN stressed importance of follow up with her providers(PCP and GI.)  CTN attempted to schedule PCP appointment but unable to locate any availability in provider schedule. CTN will route to PCP front office pool under high priority need for appointment as r/t ER visits x 2. Patient states her or her  will call GI office(Dr Hendricks)for scheduling. -CTN will continue to follow for care transitions.       Addressed changes since last contact:  medications-bentyl, colace, glycolax ordered by ER  [facility] ER visits x 2 on 23 and 23      Follow Up  Future Appointments   Date Time Provider [facility] [address]   11/15/2023 10:30 AM DO Jay Jay Ferrell [facility]     Non-[facility] follow up appointment(s): none      Patients top risk factors for readmission:

## 2023-09-12 LAB
EKG ATRIAL RATE: 82 BPM
EKG P AXIS: 79 DEGREES
EKG P-R INTERVAL: 108 MS
EKG Q-T INTERVAL: 368 MS
EKG QRS DURATION: 72 MS
EKG QTC CALCULATION (BAZETT): 429 MS
EKG R AXIS: 83 DEGREES
EKG T AXIS: 40 DEGREES
EKG VENTRICULAR RATE: 82 BPM

## 2023-09-12 PROCEDURE — 93010 ELECTROCARDIOGRAM REPORT: CPT | Performed by: INTERNAL MEDICINE

## 2023-09-19 ENCOUNTER — HOSPITAL ENCOUNTER (INPATIENT)
Age: 68
LOS: 5 days | Discharge: HOME | DRG: 885 | End: 2023-09-25
Attending: EMERGENCY MEDICINE | Admitting: PSYCHIATRY & NEUROLOGY
Payer: MEDICARE

## 2023-09-19 ENCOUNTER — CARE COORDINATION (OUTPATIENT)
Dept: CARE COORDINATION | Age: 68
End: 2023-09-19

## 2023-09-19 ENCOUNTER — OFFICE VISIT (OUTPATIENT)
Dept: FAMILY MEDICINE CLINIC | Age: 68
End: 2023-09-19
Payer: MEDICARE

## 2023-09-19 VITALS
HEIGHT: 62 IN | WEIGHT: 99 LBS | RESPIRATION RATE: 16 BRPM | SYSTOLIC BLOOD PRESSURE: 102 MMHG | OXYGEN SATURATION: 97 % | HEART RATE: 123 BPM | BODY MASS INDEX: 18.22 KG/M2 | TEMPERATURE: 97.3 F | DIASTOLIC BLOOD PRESSURE: 60 MMHG

## 2023-09-19 DIAGNOSIS — R45.851 SUICIDAL IDEATION: Primary | ICD-10-CM

## 2023-09-19 DIAGNOSIS — F32.2 SEVERE DEPRESSION (HCC): Primary | ICD-10-CM

## 2023-09-19 DIAGNOSIS — R30.0 BURNING WITH URINATION: ICD-10-CM

## 2023-09-19 LAB
ALBUMIN SERPL-MCNC: 4.2 G/DL (ref 3.5–5.2)
ALP SERPL-CCNC: 70 U/L (ref 35–104)
ALT SERPL-CCNC: 17 U/L (ref 0–32)
AMPHET UR QL SCN: NEGATIVE
ANION GAP SERPL CALCULATED.3IONS-SCNC: 10 MMOL/L (ref 7–16)
APAP SERPL-MCNC: <5 UG/ML (ref 10–30)
AST SERPL-CCNC: 17 U/L (ref 0–31)
BACTERIA URNS QL MICRO: ABNORMAL
BARBITURATES UR QL SCN: NEGATIVE
BASOPHILS # BLD: 0.03 K/UL (ref 0–0.2)
BASOPHILS NFR BLD: 0 % (ref 0–2)
BENZODIAZ UR QL: NEGATIVE
BILIRUB SERPL-MCNC: 0.4 MG/DL (ref 0–1.2)
BILIRUB UR QL STRIP: NEGATIVE
BILIRUBIN, POC: NORMAL
BLOOD URINE, POC: NORMAL
BUN SERPL-MCNC: 21 MG/DL (ref 6–23)
BUPRENORPHINE UR QL: NEGATIVE
CALCIUM SERPL-MCNC: 9.5 MG/DL (ref 8.6–10.2)
CANNABINOIDS UR QL SCN: NEGATIVE
CHLORIDE SERPL-SCNC: 104 MMOL/L (ref 98–107)
CLARITY UR: CLEAR
CLARITY, POC: CLEAR
CO2 SERPL-SCNC: 26 MMOL/L (ref 22–29)
COCAINE UR QL SCN: NEGATIVE
COLOR UR: YELLOW
COLOR, POC: NORMAL
CREAT SERPL-MCNC: 0.8 MG/DL (ref 0.5–1)
EKG ATRIAL RATE: 92 BPM
EKG P AXIS: 67 DEGREES
EKG P-R INTERVAL: 112 MS
EKG Q-T INTERVAL: 344 MS
EKG QRS DURATION: 78 MS
EKG QTC CALCULATION (BAZETT): 425 MS
EKG R AXIS: 83 DEGREES
EKG T AXIS: 67 DEGREES
EKG VENTRICULAR RATE: 92 BPM
EOSINOPHIL # BLD: 0.01 K/UL (ref 0.05–0.5)
EOSINOPHILS RELATIVE PERCENT: 0 % (ref 0–6)
EPI CELLS #/AREA URNS HPF: ABNORMAL /HPF
ERYTHROCYTE [DISTWIDTH] IN BLOOD BY AUTOMATED COUNT: 13.4 % (ref 11.5–15)
ETHANOLAMINE SERPL-MCNC: <10 MG/DL
FENTANYL UR QL: NEGATIVE
GFR SERPL CREATININE-BSD FRML MDRD: >60 ML/MIN/1.73M2
GLUCOSE SERPL-MCNC: 116 MG/DL (ref 74–99)
GLUCOSE UR STRIP-MCNC: NEGATIVE MG/DL
GLUCOSE URINE, POC: NORMAL
HCT VFR BLD AUTO: 39.6 % (ref 34–48)
HGB BLD-MCNC: 13.4 G/DL (ref 11.5–15.5)
HGB UR QL STRIP.AUTO: NEGATIVE
IMM GRANULOCYTES # BLD AUTO: 0.04 K/UL (ref 0–0.58)
IMM GRANULOCYTES NFR BLD: 1 % (ref 0–5)
KETONES UR STRIP-MCNC: >80 MG/DL
KETONES, POC: NORMAL
LEUKOCYTE EST, POC: NORMAL
LEUKOCYTE ESTERASE UR QL STRIP: NEGATIVE
LYMPHOCYTES NFR BLD: 0.84 K/UL (ref 1.5–4)
LYMPHOCYTES RELATIVE PERCENT: 11 % (ref 20–42)
MCH RBC QN AUTO: 30.5 PG (ref 26–35)
MCHC RBC AUTO-ENTMCNC: 33.8 G/DL (ref 32–34.5)
MCV RBC AUTO: 90.2 FL (ref 80–99.9)
METHADONE UR QL: NEGATIVE
MONOCYTES NFR BLD: 0.78 K/UL (ref 0.1–0.95)
MONOCYTES NFR BLD: 10 % (ref 2–12)
NEUTROPHILS NFR BLD: 79 % (ref 43–80)
NEUTS SEG NFR BLD: 6.19 K/UL (ref 1.8–7.3)
NITRITE UR QL STRIP: NEGATIVE
NITRITE, POC: NORMAL
OPIATES UR QL SCN: NEGATIVE
OXYCODONE UR QL SCN: NEGATIVE
PCP UR QL SCN: NEGATIVE
PH UR STRIP: 6.5 [PH] (ref 5–9)
PH, POC: 5
PLATELET # BLD AUTO: 323 K/UL (ref 130–450)
PMV BLD AUTO: 9.9 FL (ref 7–12)
POTASSIUM SERPL-SCNC: 4.4 MMOL/L (ref 3.5–5)
PROT SERPL-MCNC: 6.7 G/DL (ref 6.4–8.3)
PROT UR STRIP-MCNC: ABNORMAL MG/DL
PROTEIN, POC: NORMAL
RBC # BLD AUTO: 4.39 M/UL (ref 3.5–5.5)
RBC #/AREA URNS HPF: ABNORMAL /HPF
SALICYLATES SERPL-MCNC: <0.3 MG/DL (ref 0–30)
SODIUM SERPL-SCNC: 140 MMOL/L (ref 132–146)
SP GR UR STRIP: 1.02 (ref 1–1.03)
SPECIFIC GRAVITY, POC: >1.03
TEST INFORMATION: NORMAL
TOXIC TRICYCLIC SC,BLOOD: NEGATIVE
UROBILINOGEN UR STRIP-ACNC: 0.2 EU/DL (ref 0–1)
UROBILINOGEN, POC: NORMAL
WBC #/AREA URNS HPF: ABNORMAL /HPF
WBC OTHER # BLD: 7.9 K/UL (ref 4.5–11.5)

## 2023-09-19 PROCEDURE — 80307 DRUG TEST PRSMV CHEM ANLYZR: CPT

## 2023-09-19 PROCEDURE — 93005 ELECTROCARDIOGRAM TRACING: CPT | Performed by: EMERGENCY MEDICINE

## 2023-09-19 PROCEDURE — G0480 DRUG TEST DEF 1-7 CLASSES: HCPCS

## 2023-09-19 PROCEDURE — 99285 EMERGENCY DEPT VISIT HI MDM: CPT

## 2023-09-19 PROCEDURE — 81002 URINALYSIS NONAUTO W/O SCOPE: CPT | Performed by: FAMILY MEDICINE

## 2023-09-19 PROCEDURE — 80143 DRUG ASSAY ACETAMINOPHEN: CPT

## 2023-09-19 PROCEDURE — 85025 COMPLETE CBC W/AUTO DIFF WBC: CPT

## 2023-09-19 PROCEDURE — 93010 ELECTROCARDIOGRAM REPORT: CPT | Performed by: INTERNAL MEDICINE

## 2023-09-19 PROCEDURE — 80179 DRUG ASSAY SALICYLATE: CPT

## 2023-09-19 PROCEDURE — 99215 OFFICE O/P EST HI 40 MIN: CPT | Performed by: FAMILY MEDICINE

## 2023-09-19 PROCEDURE — 81001 URINALYSIS AUTO W/SCOPE: CPT

## 2023-09-19 PROCEDURE — 1123F ACP DISCUSS/DSCN MKR DOCD: CPT | Performed by: FAMILY MEDICINE

## 2023-09-19 PROCEDURE — 80053 COMPREHEN METABOLIC PANEL: CPT

## 2023-09-19 RX ORDER — FLUOXETINE 10 MG/1
CAPSULE ORAL
COMMUNITY
Start: 2023-09-09 | End: 2023-09-19

## 2023-09-19 SDOH — ECONOMIC STABILITY: FOOD INSECURITY: WITHIN THE PAST 12 MONTHS, YOU WORRIED THAT YOUR FOOD WOULD RUN OUT BEFORE YOU GOT MONEY TO BUY MORE.: NEVER TRUE

## 2023-09-19 SDOH — ECONOMIC STABILITY: FOOD INSECURITY: WITHIN THE PAST 12 MONTHS, THE FOOD YOU BOUGHT JUST DIDN'T LAST AND YOU DIDN'T HAVE MONEY TO GET MORE.: NEVER TRUE

## 2023-09-19 SDOH — ECONOMIC STABILITY: INCOME INSECURITY: HOW HARD IS IT FOR YOU TO PAY FOR THE VERY BASICS LIKE FOOD, HOUSING, MEDICAL CARE, AND HEATING?: NOT HARD AT ALL

## 2023-09-19 ASSESSMENT — PATIENT HEALTH QUESTIONNAIRE - PHQ9
10. IF YOU CHECKED OFF ANY PROBLEMS, HOW DIFFICULT HAVE THESE PROBLEMS MADE IT FOR YOU TO DO YOUR WORK, TAKE CARE OF THINGS AT HOME, OR GET ALONG WITH OTHER PEOPLE: 2
2. FEELING DOWN, DEPRESSED OR HOPELESS: 3
1. LITTLE INTEREST OR PLEASURE IN DOING THINGS: 3
6. FEELING BAD ABOUT YOURSELF - OR THAT YOU ARE A FAILURE OR HAVE LET YOURSELF OR YOUR FAMILY DOWN: 2
8. MOVING OR SPEAKING SO SLOWLY THAT OTHER PEOPLE COULD HAVE NOTICED. OR THE OPPOSITE, BEING SO FIGETY OR RESTLESS THAT YOU HAVE BEEN MOVING AROUND A LOT MORE THAN USUAL: 3
SUM OF ALL RESPONSES TO PHQ QUESTIONS 1-9: 24
4. FEELING TIRED OR HAVING LITTLE ENERGY: 3
7. TROUBLE CONCENTRATING ON THINGS, SUCH AS READING THE NEWSPAPER OR WATCHING TELEVISION: 3
9. THOUGHTS THAT YOU WOULD BE BETTER OFF DEAD, OR OF HURTING YOURSELF: 2
SUM OF ALL RESPONSES TO PHQ QUESTIONS 1-9: 24
SUM OF ALL RESPONSES TO PHQ QUESTIONS 1-9: 22
3. TROUBLE FALLING OR STAYING ASLEEP: 2
SUM OF ALL RESPONSES TO PHQ QUESTIONS 1-9: 24
SUM OF ALL RESPONSES TO PHQ9 QUESTIONS 1 & 2: 6
5. POOR APPETITE OR OVEREATING: 3

## 2023-09-19 ASSESSMENT — COLUMBIA-SUICIDE SEVERITY RATING SCALE - C-SSRS
1. WITHIN THE PAST MONTH, HAVE YOU WISHED YOU WERE DEAD OR WISHED YOU COULD GO TO SLEEP AND NOT WAKE UP?: YES
6. HAVE YOU EVER DONE ANYTHING, STARTED TO DO ANYTHING, OR PREPARED TO DO ANYTHING TO END YOUR LIFE?: NO
2. HAVE YOU ACTUALLY HAD ANY THOUGHTS OF KILLING YOURSELF?: NO

## 2023-09-19 ASSESSMENT — PAIN - FUNCTIONAL ASSESSMENT: PAIN_FUNCTIONAL_ASSESSMENT: NONE - DENIES PAIN

## 2023-09-19 NOTE — CARE COORDINATION
-Noted in EMR patient currently at [facility] will continue to follow chart peripherally for disposition.

## 2023-09-19 NOTE — ED NOTES
Pt made aware need for urine sample. Pt states she just gave one at doctors office 1hr ago.       Darcy Kuhn RN  09/19/23 1417

## 2023-09-19 NOTE — ED NOTES
CO request faxed to staffing and spoke with francheska at this time.       Dodie Moe RN  09/19/23 5761
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
## 2023-09-20 ENCOUNTER — CARE COORDINATION (OUTPATIENT)
Dept: CARE COORDINATION | Age: 68
End: 2023-09-20

## 2023-09-20 PROBLEM — F41.9 ANXIETY: Status: RESOLVED | Noted: 2023-08-17 | Resolved: 2023-09-20

## 2023-09-20 PROBLEM — F23 BRIEF PSYCHOTIC DISORDER (HCC): Status: ACTIVE | Noted: 2023-09-20

## 2023-09-20 PROBLEM — F41.3 OTHER MIXED ANXIETY DISORDERS: Status: ACTIVE | Noted: 2023-08-17

## 2023-09-20 PROBLEM — F39 MOOD DISORDER (HCC): Status: ACTIVE | Noted: 2023-09-20

## 2023-09-20 PROBLEM — F33.9 MAJOR DEPRESSION, RECURRENT (HCC): Status: ACTIVE | Noted: 2023-09-20

## 2023-09-20 PROBLEM — F33.9 MAJOR DEPRESSION, RECURRENT (HCC): Status: RESOLVED | Noted: 2023-09-20 | Resolved: 2023-09-20

## 2023-09-20 PROCEDURE — 1240000000 HC EMOTIONAL WELLNESS R&B

## 2023-09-20 PROCEDURE — 90792 PSYCH DIAG EVAL W/MED SRVCS: CPT | Performed by: NURSE PRACTITIONER

## 2023-09-20 PROCEDURE — 6370000000 HC RX 637 (ALT 250 FOR IP): Performed by: NURSE PRACTITIONER

## 2023-09-20 PROCEDURE — 6370000000 HC RX 637 (ALT 250 FOR IP): Performed by: PSYCHIATRY & NEUROLOGY

## 2023-09-20 RX ORDER — OLANZAPINE 5 MG/1
10 TABLET, ORALLY DISINTEGRATING ORAL NIGHTLY
Status: DISCONTINUED | OUTPATIENT
Start: 2023-09-20 | End: 2023-09-22

## 2023-09-20 RX ORDER — ACETAMINOPHEN 325 MG/1
650 TABLET ORAL EVERY 4 HOURS PRN
Status: DISCONTINUED | OUTPATIENT
Start: 2023-09-20 | End: 2023-09-25 | Stop reason: HOSPADM

## 2023-09-20 RX ORDER — HALOPERIDOL 2 MG/1
3 TABLET ORAL EVERY 6 HOURS PRN
Status: DISCONTINUED | OUTPATIENT
Start: 2023-09-20 | End: 2023-09-25 | Stop reason: HOSPADM

## 2023-09-20 RX ORDER — HALOPERIDOL 5 MG/ML
3 INJECTION INTRAMUSCULAR EVERY 6 HOURS PRN
Status: DISCONTINUED | OUTPATIENT
Start: 2023-09-20 | End: 2023-09-25 | Stop reason: HOSPADM

## 2023-09-20 RX ORDER — DIVALPROEX SODIUM 250 MG/1
250 TABLET, DELAYED RELEASE ORAL EVERY 12 HOURS SCHEDULED
Status: DISCONTINUED | OUTPATIENT
Start: 2023-09-20 | End: 2023-09-25 | Stop reason: HOSPADM

## 2023-09-20 RX ORDER — DIVALPROEX SODIUM 250 MG/1
250 TABLET, DELAYED RELEASE ORAL ONCE
Status: COMPLETED | OUTPATIENT
Start: 2023-09-20 | End: 2023-09-20

## 2023-09-20 RX ORDER — PROMETHAZINE HYDROCHLORIDE 12.5 MG/1
12.5 TABLET ORAL EVERY 6 HOURS PRN
COMMUNITY

## 2023-09-20 RX ORDER — OLANZAPINE 5 MG/1
10 TABLET, ORALLY DISINTEGRATING ORAL ONCE
Status: COMPLETED | OUTPATIENT
Start: 2023-09-20 | End: 2023-09-20

## 2023-09-20 RX ORDER — DIPHENHYDRAMINE HCL 25 MG
50 TABLET ORAL ONCE
Status: COMPLETED | OUTPATIENT
Start: 2023-09-20 | End: 2023-09-20

## 2023-09-20 RX ORDER — MAGNESIUM HYDROXIDE/ALUMINUM HYDROXICE/SIMETHICONE 120; 1200; 1200 MG/30ML; MG/30ML; MG/30ML
30 SUSPENSION ORAL PRN
Status: DISCONTINUED | OUTPATIENT
Start: 2023-09-20 | End: 2023-09-25 | Stop reason: HOSPADM

## 2023-09-20 RX ORDER — NICOTINE 21 MG/24HR
1 PATCH, TRANSDERMAL 24 HOURS TRANSDERMAL DAILY
Status: DISCONTINUED | OUTPATIENT
Start: 2023-09-20 | End: 2023-09-25 | Stop reason: HOSPADM

## 2023-09-20 RX ORDER — VALPROIC ACID 250 MG/5ML
500 SOLUTION ORAL ONCE
Status: COMPLETED | OUTPATIENT
Start: 2023-09-20 | End: 2023-09-20

## 2023-09-20 RX ORDER — DIPHENHYDRAMINE HYDROCHLORIDE 50 MG/ML
50 INJECTION INTRAMUSCULAR; INTRAVENOUS ONCE
Status: DISCONTINUED | OUTPATIENT
Start: 2023-09-20 | End: 2023-09-20

## 2023-09-20 RX ORDER — METOCLOPRAMIDE 5 MG/1
5 TABLET ORAL 3 TIMES DAILY
COMMUNITY

## 2023-09-20 RX ADMIN — OLANZAPINE 10 MG: 5 TABLET, ORALLY DISINTEGRATING ORAL at 13:08

## 2023-09-20 RX ADMIN — DIVALPROEX SODIUM 250 MG: 250 TABLET, DELAYED RELEASE ORAL at 13:08

## 2023-09-20 RX ADMIN — OLANZAPINE 10 MG: 5 TABLET, ORALLY DISINTEGRATING ORAL at 21:00

## 2023-09-20 RX ADMIN — DIPHENHYDRAMINE HYDROCHLORIDE 50 MG: 25 TABLET ORAL at 13:08

## 2023-09-20 RX ADMIN — APIXABAN 5 MG: 5 TABLET, FILM COATED ORAL at 13:08

## 2023-09-20 RX ADMIN — VALPROIC ACID 500 MG: 250 SOLUTION ORAL at 17:26

## 2023-09-20 RX ADMIN — HALOPERIDOL 3 MG: 2 TABLET ORAL at 08:22

## 2023-09-20 RX ADMIN — DIVALPROEX SODIUM 250 MG: 250 TABLET, DELAYED RELEASE ORAL at 21:00

## 2023-09-20 ASSESSMENT — SLEEP AND FATIGUE QUESTIONNAIRES
AVERAGE NUMBER OF SLEEP HOURS: 4
SLEEP PATTERN: DIFFICULTY FALLING ASLEEP;DISTURBED/INTERRUPTED SLEEP;EARLY AWAKENING
DO YOU HAVE DIFFICULTY SLEEPING: YES
AVERAGE NUMBER OF SLEEP HOURS: 0
DO YOU HAVE DIFFICULTY SLEEPING: YES
SLEEP PATTERN: DIFFICULTY FALLING ASLEEP;DISTURBED/INTERRUPTED SLEEP;EARLY AWAKENING
DO YOU USE A SLEEP AID: NO
DO YOU USE A SLEEP AID: NO

## 2023-09-20 ASSESSMENT — PAIN SCALES - GENERAL: PAINLEVEL_OUTOF10: 0

## 2023-09-20 ASSESSMENT — PATIENT HEALTH QUESTIONNAIRE - PHQ9
3. TROUBLE FALLING OR STAYING ASLEEP: 3
2. FEELING DOWN, DEPRESSED OR HOPELESS: 3
1. LITTLE INTEREST OR PLEASURE IN DOING THINGS: 3
9. THOUGHTS THAT YOU WOULD BE BETTER OFF DEAD, OR OF HURTING YOURSELF: 1
SUM OF ALL RESPONSES TO PHQ QUESTIONS 1-9: 21
SUM OF ALL RESPONSES TO PHQ QUESTIONS 1-9: 22
4. FEELING TIRED OR HAVING LITTLE ENERGY: 3
7. TROUBLE CONCENTRATING ON THINGS, SUCH AS READING THE NEWSPAPER OR WATCHING TELEVISION: 3
SUM OF ALL RESPONSES TO PHQ QUESTIONS 1-9: 22
SUM OF ALL RESPONSES TO PHQ QUESTIONS 1-9: 22
6. FEELING BAD ABOUT YOURSELF - OR THAT YOU ARE A FAILURE OR HAVE LET YOURSELF OR YOUR FAMILY DOWN: 1
8. MOVING OR SPEAKING SO SLOWLY THAT OTHER PEOPLE COULD HAVE NOTICED. OR THE OPPOSITE, BEING SO FIGETY OR RESTLESS THAT YOU HAVE BEEN MOVING AROUND A LOT MORE THAN USUAL: 2
10. IF YOU CHECKED OFF ANY PROBLEMS, HOW DIFFICULT HAVE THESE PROBLEMS MADE IT FOR YOU TO DO YOUR WORK, TAKE CARE OF THINGS AT HOME, OR GET ALONG WITH OTHER PEOPLE: 2
SUM OF ALL RESPONSES TO PHQ9 QUESTIONS 1 & 2: 6
5. POOR APPETITE OR OVEREATING: 3

## 2023-09-20 ASSESSMENT — LIFESTYLE VARIABLES
HOW MANY STANDARD DRINKS CONTAINING ALCOHOL DO YOU HAVE ON A TYPICAL DAY: PATIENT DOES NOT DRINK
HOW OFTEN DO YOU HAVE A DRINK CONTAINING ALCOHOL: NEVER
HOW MANY STANDARD DRINKS CONTAINING ALCOHOL DO YOU HAVE ON A TYPICAL DAY: PATIENT DOES NOT DRINK
HOW OFTEN DO YOU HAVE A DRINK CONTAINING ALCOHOL: NEVER

## 2023-09-20 ASSESSMENT — PAIN - FUNCTIONAL ASSESSMENT: PAIN_FUNCTIONAL_ASSESSMENT: NONE - DENIES PAIN

## 2023-09-20 NOTE — CARE COORDINATION
Biopsychosocial Assessment Note    Social work met with patient to complete the biopsychosocial assessment and C-SSRS. Chief Complaint: Pt reported that she is currently in the hospital because \"I need to be knocked out, I just cant explain it, I hit my head. \"     Mental Status Exam: Pt is alert and oriented x4. Pt's mood is anxious and depressed and terrified, affect is unstable. Pt's speech is pressured, rate is fast and volume is normal. Pt's eye contact is poor. Pt's thoughts process is circumstantial, thought content is preoccupied with her admission. Pt's memory is impaired, pt is a poor historian. Pt's insight and judgement is poor. Pt was evasive and preoccupied during assessment and offered minimal information. Pt reported SI if she does not get help, pt stated that she wants medications that will make her go to sleep so she does not have to feel like this anymore and if she doesn't get them she will break the window, denied HI, AVH. Clinical Summary: Pt is a 81-year-old female, who presented to the ER due to being terrified and needing help after hitting her head. Per ED notes, \" Patient states she wants to die. She is very depressed. She said she no longer wants to live but has been trying to starve herself. She says she hit her head on the garage a few months ago, and since then, she has been depressed. Her she did have a head CT at time which was negative. \"     Pt stated that she has no previous inpatient mental health admissions and denied being active with an outpatient mental health provider. Pt stated that she has no history of trauma or abuse. Pt stated that her main stressor is that she is traumatized and she will not leave her bed after hitting her head when she was cleaning her garage in July and she has not been able to do anything due to having Vertigo. Pt stated that she has no balance and she just wants to be knocked out.  Pt denied being on any mental health medications at this

## 2023-09-20 NOTE — ED NOTES
Patient was accepted to [address] [place] [address] notified.       Monty Otero RN  09/20/23 0025

## 2023-09-20 NOTE — ED NOTES
Per previous SW Pt gave verbal consent for us to reach out and update her  Miguel Salguero on disposition. SW spoke to Pt  and update him on plan of care and room number/unit number. SW update Pt.       Daryl Juan, MSW, [facility]  09/20/23 4852

## 2023-09-20 NOTE — CARE COORDINATION
[facility] contacted pt  Vinny Nieto [phone] (KELY signed). Vinny Nieto reports pt went to a doctors appointment and they had the psychologist come talk with her. Pt anxiety was really high and she said she did not want to live. Vinny Nieto does not think pt wanted to commit suicide he just thinks she does not like feeling anxious. Vinny Nieto reports pt anxiety has been bad for the past 4-5 weeks. Vinny Nieto reports pt will return home, he will pick her up, and she does not have access to any guns or weapons. Vinny Nieto has no safety concerns for pt at this time.  Vinny Nieto feels pt needs to be put on the right medications and needs to be referred to a psychiatrist.

## 2023-09-20 NOTE — ED NOTES
Patient is pink slipped by Primary Care for not eating in 3 weeks and will not leave her bed. States she only wants to go away and die. Hopes someone will help kill her with shots or injections. States that she constantly has 2 voices in her head. Family reports depression is rapidly worsening and fearful she will hurt herself. Patient has a mental health hx of major depressive disorder and anxiety.      Patient is medically clear and labs have resulted       FEDE Navarro, W  09/19/23 2138       FEDE Navarro, [address]  09/19/23 2208

## 2023-09-20 NOTE — ED NOTES
SW spoke to [place] in admitting and assigned bed 7302B.      FEDE Greco, [facility]  09/20/23 3189

## 2023-09-20 NOTE — H&P
This report was transcribed using voice recognition software. Every effort was made to ensure accuracy; however, inadvertent computerized transcription errors may be present. Behavioral Services  Medicare Certification Upon Admission    I certify that this patient's inpatient psychiatric hospital admission is medically necessary for:    [x] (1) Treatment which could reasonably be expected to improve this patient's condition,       [x] (2) Or for diagnostic study;     AND     [x](2) The inpatient psychiatric services are provided while the individual is under the care of a physician and are included in the individualized plan of care.     Estimated length of stay/service 5 to 7 days based on stability    Plan for post-hospital care follow-up with outpatient provider    Electronically signed by SCOTTY Mauricio CNP on 9/20/2023 at 12:49 PM          Electronically signed by Amparo Royal on 9/20/2023 at 9:00 AM

## 2023-09-20 NOTE — ED NOTES
Called report to floor, room and rn ready. Transport notified.       Hobson Osgood, RN  09/20/23 9066

## 2023-09-21 PROBLEM — E43 SEVERE PROTEIN-CALORIE MALNUTRITION (HCC): Status: ACTIVE | Noted: 2023-09-21

## 2023-09-21 PROBLEM — E44.0 MODERATE PROTEIN-CALORIE MALNUTRITION (HCC): Status: ACTIVE | Noted: 2023-09-21

## 2023-09-21 LAB
CHOLEST SERPL-MCNC: 180 MG/DL
HDLC SERPL-MCNC: 43 MG/DL
LDLC SERPL CALC-MCNC: 124 MG/DL
TRIGL SERPL-MCNC: 63 MG/DL
VLDLC SERPL CALC-MCNC: 13 MG/DL

## 2023-09-21 PROCEDURE — 99232 SBSQ HOSP IP/OBS MODERATE 35: CPT | Performed by: NURSE PRACTITIONER

## 2023-09-21 PROCEDURE — 36415 COLL VENOUS BLD VENIPUNCTURE: CPT

## 2023-09-21 PROCEDURE — 6370000000 HC RX 637 (ALT 250 FOR IP): Performed by: NURSE PRACTITIONER

## 2023-09-21 PROCEDURE — 80061 LIPID PANEL: CPT

## 2023-09-21 PROCEDURE — 1240000000 HC EMOTIONAL WELLNESS R&B

## 2023-09-21 RX ADMIN — DIVALPROEX SODIUM 250 MG: 250 TABLET, DELAYED RELEASE ORAL at 10:50

## 2023-09-21 RX ADMIN — OLANZAPINE 10 MG: 5 TABLET, ORALLY DISINTEGRATING ORAL at 20:32

## 2023-09-21 RX ADMIN — DIVALPROEX SODIUM 250 MG: 250 TABLET, DELAYED RELEASE ORAL at 20:32

## 2023-09-21 ASSESSMENT — PAIN SCALES - GENERAL
PAINLEVEL_OUTOF10: 0

## 2023-09-21 NOTE — GROUP NOTE
Group Therapy Note    Date: 9/21/2023    Group Start Time: 8575  Group End Time: 4930  Group Topic: Psychoeducation    [facility] [address]                                                                        Group Therapy Note    Date: 9/21/2023  Module Name:  self esteem     Patient's Goal:  patient will be able to clarify 3 steps to improve his/her own self esteem. Notes:  Pleasant and sharing, able to identify an achievement he/she is proud of. Accepting of handout. Status After Intervention:  Improved    Participation Level:  Active Listener and Interactive    Participation Quality: Appropriate, Attentive, Sharing, and Supportive      Speech:  normal      Thought Process/Content: Logical      Affective Functioning: Congruent      Mood: euthymic      Level of consciousness:  Alert, Oriented x4, and Attentive      Response to Learning: Able to verbalize/acknowledge new learning, Able to retain information, and Progressing to goal      Endings: None Reported    Modes of Intervention: Education, Support, Socialization, and Problem-solving      Discipline Responsible: Psychoeducational Specialist      Signature:  Tita Phillips

## 2023-09-22 PROCEDURE — 6370000000 HC RX 637 (ALT 250 FOR IP): Performed by: NURSE PRACTITIONER

## 2023-09-22 PROCEDURE — 1240000000 HC EMOTIONAL WELLNESS R&B

## 2023-09-22 PROCEDURE — 99232 SBSQ HOSP IP/OBS MODERATE 35: CPT | Performed by: NURSE PRACTITIONER

## 2023-09-22 RX ORDER — OLANZAPINE 5 MG/1
5 TABLET, ORALLY DISINTEGRATING ORAL NIGHTLY
Status: DISCONTINUED | OUTPATIENT
Start: 2023-09-22 | End: 2023-09-25

## 2023-09-22 RX ADMIN — DIVALPROEX SODIUM 250 MG: 250 TABLET, DELAYED RELEASE ORAL at 20:41

## 2023-09-22 RX ADMIN — OLANZAPINE 5 MG: 5 TABLET, ORALLY DISINTEGRATING ORAL at 20:41

## 2023-09-22 RX ADMIN — DIVALPROEX SODIUM 250 MG: 250 TABLET, DELAYED RELEASE ORAL at 10:33

## 2023-09-22 ASSESSMENT — PAIN SCALES - GENERAL: PAINLEVEL_OUTOF10: 0

## 2023-09-22 NOTE — GROUP NOTE
Group Therapy Note    Date: 9/22/2023    Group Start Time: 1130  Group End Time: 5350  Group Topic: Psychoeducation    [facility] [address]                                                                        Group Therapy Note    Date: 9/22/2023      Module Name:  coping skills     Patient's Goal:  patient will be able to id coping skills, and what strategies to help one manager stressors when they are discharged. Notes:  Pleasant and engaged in group. Willing to share and ask questions thru out. Status After Intervention:  Improved    Participation Level:  Active Listener and Interactive    Participation Quality: Appropriate, Attentive, and Sharing      Speech:  normal      Thought Process/Content: Logical      Affective Functioning: Congruent      Mood: euthymic      Level of consciousness:  Alert, Oriented x4, and Attentive      Response to Learning: Able to verbalize/acknowledge new learning, Able to retain information, and Progressing to goal      Endings: None Reported    Modes of Intervention: Education, Support, Socialization, and Problem-solving      Discipline Responsible: Psychoeducational Specialist      Signature:  Miroslava Barrett

## 2023-09-22 NOTE — CARE COORDINATION
SW met with pt to discuss possible discharge for today. Pt stated that she is feeling very groggy today however she does feel calmer. Pt stated that she does not like the way that she is currnelty feeling and she does not like the medications. Pt stated that her nerves were keeping her up at night but she is sleeping better. Pt stated that she does not know if she is ready to go home. Pt stated that when she is discharged she will be going home with her  and he will pick her up from the hospital. Pt stated that she lives in [place] and would like to go to [facility] health services. Pt denied any SI, HI, AVH. Pt stated that her anxiety is a 5/10 and denied any depression. Pt was very preoccupied during this encounter with feeling groggy.

## 2023-09-22 NOTE — CARE COORDINATION
Phone call to kathya earl to schedule new pt appts.  Pt has an appt Tuesday 10/3, at 1210AM with maira for medication and 10/5 at 12PM with bob for counseling

## 2023-09-23 LAB
DATE LAST DOSE: ABNORMAL
TME LAST DOSE: ABNORMAL H
VALPROATE SERPL-MCNC: 26 UG/ML (ref 50–100)
VANCOMYCIN DOSE: ABNORMAL MG

## 2023-09-23 PROCEDURE — 6370000000 HC RX 637 (ALT 250 FOR IP): Performed by: NURSE PRACTITIONER

## 2023-09-23 PROCEDURE — 36415 COLL VENOUS BLD VENIPUNCTURE: CPT

## 2023-09-23 PROCEDURE — 99232 SBSQ HOSP IP/OBS MODERATE 35: CPT | Performed by: NURSE PRACTITIONER

## 2023-09-23 PROCEDURE — 1240000000 HC EMOTIONAL WELLNESS R&B

## 2023-09-23 PROCEDURE — 80164 ASSAY DIPROPYLACETIC ACD TOT: CPT

## 2023-09-23 RX ADMIN — DIVALPROEX SODIUM 250 MG: 250 TABLET, DELAYED RELEASE ORAL at 22:12

## 2023-09-23 RX ADMIN — OLANZAPINE 5 MG: 5 TABLET, ORALLY DISINTEGRATING ORAL at 22:12

## 2023-09-23 RX ADMIN — DIVALPROEX SODIUM 250 MG: 250 TABLET, DELAYED RELEASE ORAL at 08:44

## 2023-09-23 RX ADMIN — APIXABAN 5 MG: 5 TABLET, FILM COATED ORAL at 08:44

## 2023-09-23 RX ADMIN — APIXABAN 5 MG: 5 TABLET, FILM COATED ORAL at 22:12

## 2023-09-23 ASSESSMENT — PAIN SCALES - GENERAL: PAINLEVEL_OUTOF10: 0

## 2023-09-24 LAB

## 2023-09-24 PROCEDURE — 1240000000 HC EMOTIONAL WELLNESS R&B

## 2023-09-24 PROCEDURE — 0202U NFCT DS 22 TRGT SARS-COV-2: CPT

## 2023-09-24 PROCEDURE — 6370000000 HC RX 637 (ALT 250 FOR IP): Performed by: NURSE PRACTITIONER

## 2023-09-24 PROCEDURE — 99232 SBSQ HOSP IP/OBS MODERATE 35: CPT | Performed by: NURSE PRACTITIONER

## 2023-09-24 PROCEDURE — 6370000000 HC RX 637 (ALT 250 FOR IP): Performed by: PSYCHIATRY & NEUROLOGY

## 2023-09-24 RX ADMIN — DIVALPROEX SODIUM 250 MG: 250 TABLET, DELAYED RELEASE ORAL at 22:25

## 2023-09-24 RX ADMIN — APIXABAN 5 MG: 5 TABLET, FILM COATED ORAL at 22:24

## 2023-09-24 RX ADMIN — ACETAMINOPHEN 650 MG: 325 TABLET ORAL at 20:35

## 2023-09-24 RX ADMIN — DIVALPROEX SODIUM 250 MG: 250 TABLET, DELAYED RELEASE ORAL at 09:41

## 2023-09-24 RX ADMIN — APIXABAN 5 MG: 5 TABLET, FILM COATED ORAL at 09:41

## 2023-09-24 RX ADMIN — OLANZAPINE 5 MG: 5 TABLET, ORALLY DISINTEGRATING ORAL at 22:25

## 2023-09-25 VITALS
OXYGEN SATURATION: 99 % | WEIGHT: 99 LBS | HEIGHT: 62 IN | HEART RATE: 70 BPM | BODY MASS INDEX: 18.22 KG/M2 | RESPIRATION RATE: 16 BRPM | DIASTOLIC BLOOD PRESSURE: 79 MMHG | TEMPERATURE: 97 F | SYSTOLIC BLOOD PRESSURE: 127 MMHG

## 2023-09-25 PROCEDURE — 6370000000 HC RX 637 (ALT 250 FOR IP): Performed by: NURSE PRACTITIONER

## 2023-09-25 PROCEDURE — 6370000000 HC RX 637 (ALT 250 FOR IP): Performed by: PSYCHIATRY & NEUROLOGY

## 2023-09-25 PROCEDURE — 99239 HOSP IP/OBS DSCHRG MGMT >30: CPT | Performed by: NURSE PRACTITIONER

## 2023-09-25 RX ORDER — OLANZAPINE 5 MG/1
5 TABLET ORAL NIGHTLY
Status: DISCONTINUED | OUTPATIENT
Start: 2023-09-25 | End: 2023-09-25 | Stop reason: HOSPADM

## 2023-09-25 RX ORDER — DIVALPROEX SODIUM 250 MG/1
250 TABLET, DELAYED RELEASE ORAL EVERY 12 HOURS SCHEDULED
Qty: 60 TABLET | Refills: 0 | Status: SHIPPED | OUTPATIENT
Start: 2023-09-25 | End: 2023-10-25

## 2023-09-25 RX ORDER — OLANZAPINE 5 MG/1
5 TABLET ORAL NIGHTLY
Qty: 30 TABLET | Refills: 0 | Status: SHIPPED | OUTPATIENT
Start: 2023-09-25 | End: 2023-10-25

## 2023-09-25 RX ADMIN — DIVALPROEX SODIUM 250 MG: 250 TABLET, DELAYED RELEASE ORAL at 10:09

## 2023-09-25 RX ADMIN — APIXABAN 5 MG: 5 TABLET, FILM COATED ORAL at 10:09

## 2023-09-25 RX ADMIN — ACETAMINOPHEN 650 MG: 325 TABLET ORAL at 06:30

## 2023-09-25 NOTE — PLAN OF CARE
[facility]  Day 3 Interdisciplinary Treatment Plan NOTE    Review Date & Time: 9/22/23 1230    Patient was in treatment team    Estimated Length of Stay Update:  3-5 days  Estimated Discharge Date Update: 5-7 days    EDUCATION:   Learner Progress Toward Treatment Goals: Reviewed results and recommendations of this team    Method: Group    Outcome: Verbalized understanding    PATIENT GOALS:     PLAN/TREATMENT RECOMMENDATIONS UPDATE:day 7    GOALS UPDATE:   Time frame for Short-Term Goals: 3-5 days      Yan Mccullough RN
Denies SI/HI  denies hallucinations  isolative to her room most of this shift  affect is blunt  states she does not feel better today  cooperative with meds  did not attend group  will continue to monitor
Denies SI/HI  denies hallucinations  isolative to room most of this shift  cooperative with meds   attending select groups   will continue to monitor
Denies SI/HI  denies hallucinations  states mood today is the same as yesterday  cooperative with meds   isolative to room most of this shift  affect is blunt  some underlying irritability   will continue to monitor
Problem: Depression  Goal: Will be euthymic at discharge  Description: INTERVENTIONS:  1. Administer medication as ordered  2. Provide emotional support via 1:1 interaction with staff  3. Encourage involvement in milieu/groups/activities  4. Monitor for social isolation  Outcome: Progressing     Problem: Anxiety  Goal: Will report anxiety at manageable levels  Description: INTERVENTIONS:  1. Administer medication as ordered  2. Teach and rehearse alternative coping skills  3. Provide emotional support with 1:1 interaction with staff  Outcome: Progressing     Patient has been withdrawn to her room. Affect is flat and blunted. Her mood is anxious and depressed. Presents hopeless and helpless. Rates anxiety and depression both 10/10 d/t \"anxious about my life changing. Having vertigo and horrible things that have happened\". Denies suicidal/homicidal ideations and hallucinations at this time. Patient has c/o poor sleep. Took HS medications except for Eliquis, stating \"they said I dont need to take it anymore\". Purposeful rounding continued.
Problem: Depression  Goal: Will be euthymic at discharge  Description: INTERVENTIONS:  1. Administer medication as ordered  2. Provide emotional support via 1:1 interaction with staff  3. Encourage involvement in milieu/groups/activities  4. Monitor for social isolation  Outcome: Progressing     Problem: Anxiety  Goal: Will report anxiety at manageable levels  Description: INTERVENTIONS:  1. Administer medication as ordered  2. Teach and rehearse alternative coping skills  3. Provide emotional support with 1:1 interaction with staff  Outcome: Progressing     Patient has been withdrawn to her room. Did come out for snack. Calm and cooperative during assessment. Affect is flat/sad and mood is anxious/depressed. Rates anxiety 10/10 stating its \"way up there\" and depression 5/10 d/t her health issues. She denies suicidal/homicidal ideations and hallucinations at this time. Took HS medications without difficulty. Purposeful rounding continued.
Problem: Depression  Goal: Will be euthymic at discharge  Description: INTERVENTIONS:  1. Administer medication as ordered  2. Provide emotional support via 1:1 interaction with staff  3. Encourage involvement in milieu/groups/activities  4. Monitor for social isolation  Outcome: Progressing     Problem: Anxiety  Goal: Will report anxiety at manageable levels  Description: INTERVENTIONS:  1. Administer medication as ordered  2. Teach and rehearse alternative coping skills  3. Provide emotional support with 1:1 interaction with staff  Outcome: Progressing     Problem: Behavior  Goal: Pt/Family maintain appropriate behavior and adhere to behavioral management agreement, if implemented  Description: INTERVENTIONS:  1. Assess patient/family's coping skills and  non-compliant behavior (including use of illegal substances)  2. Notify security of behavior or suspected illegal substances which indicate the need for search of the family and/or belongings  3. Encourage verbalization of thoughts and concerns in a socially appropriate manner  4. Utilize positive, consistent limit setting strategies supporting safety of patient, staff and others  5. Encourage participation in the decision making process about the behavioral management agreement  6. If a visitor's behavior poses a threat to safety call refer to organization policy.   7. Initiate consult with , Psychosocial CNS, Spiritual Care as appropriate  Outcome: Progressing
Problem: Depression  Goal: Will be euthymic at discharge  Description: INTERVENTIONS:  1. Administer medication as ordered  2. Provide emotional support via 1:1 interaction with staff  3. Encourage involvement in milieu/groups/activities  4. Monitor for social isolation  Outcome: Progressing     Problem: Anxiety  Goal: Will report anxiety at manageable levels  Description: INTERVENTIONS:  1. Administer medication as ordered  2. Teach and rehearse alternative coping skills  3. Provide emotional support with 1:1 interaction with staff  Outcome: Progressing  Flowsheets (Taken 9/20/2023 4588)  Will report anxiety at manageable levels: Teach and rehearse alternative coping skills     Problem: Self Care Deficit  Goal: Return ADL status to a safe level of function  Description: INTERVENTIONS:  1. Administer medication as ordered  2. Assess ADL deficits and provide assistive devices as needed  3. Obtain PT/OT consults as needed  4.  Assist and instruct patient to increase activity and self care as tolerated  Outcome: Progressing
Problem: Self Harm/Suicidality  Goal: Will have no self-injury during hospital stay  Description: INTERVENTIONS:  1. Ensure constant observer at bedside with Q15M safety checks  2. Maintain a safe environment  3. Secure patient belongings  4. Ensure family/visitors adhere to safety recommendations  5. Ensure safety tray has been added to patient's diet order  6. Every shift and PRN: Re-assess suicidal risk via Frequent Screener    Outcome: Progressing     Problem: Anxiety  Goal: Will report anxiety at manageable levels  Description: INTERVENTIONS:  1. Administer medication as ordered  2. Teach and rehearse alternative coping skills  3.  Provide emotional support with 1:1 interaction with staff  Outcome: Progressing     Problem: Nutrition Deficit:  Goal: Optimize nutritional status  Outcome: Progressing
1224 by Willie Lynn RN  Outcome: Progressing     Problem: Sleep Disturbance  Goal: Will exhibit normal sleeping pattern  Description: INTERVENTIONS:  1. Administer medication as ordered  2. Decrease environmental stimuli, including noise, as appropriate  3. Discourage social isolation and naps during the day  Outcome: Progressing     Problem: Involuntary Admit  Goal: Will cooperate with staff recommendations and doctor's orders and will demonstrate appropriate behavior  Description: INTERVENTIONS:  1. Treat underlying conditions and offer medication as ordered  2. Educate regarding involuntary admission procedures and rules  3. Contain excessive/inappropriate behavior per unit and hospital policies  Outcome: Progressing     Problem: Self Care Deficit  Goal: Return ADL status to a safe level of function  Description: INTERVENTIONS:  1. Administer medication as ordered  2. Assess ADL deficits and provide assistive devices as needed  3. Obtain PT/OT consults as needed  4. Assist and instruct patient to increase activity and self care as tolerated  Outcome: Progressing     Problem: Risk for Elopement  Goal: Patient will not exit the unit/facility without proper excort  Outcome: Progressing    Patient denies suicidal ideations, homicidal ideations, and hallucinations. Patient reports improved mood since yesterday and is able to verbalize feeling hopeful that her current medication regimen will work. Patient states that she will attempt to attend one group meeting today. Patient observed resting in her room. Patient reports more of an appetite today. Patient is medication compliant and is in control of her behavior.
[facility] if they are taking new patients. Appetite is improving. She is medication compliant and is in control of her behavior.

## 2023-09-25 NOTE — CARE COORDINATION
SW met with pt to discuss discharge for today. Pt reported that she will be discharging to back home and her  will be picking her up from the hospital. Pt stated that she is looking forward to going home to get rest because she has COVID. Pt reported to feeling not well today because of the COVID. Pt stated that she would be more comfortable at home while she gets over the [facility]. Pt is alert and oriented x4. Pt's mood is anxious but improved, affect is congruent. Pt's speech is clear, rate and volume is normal. Pt's insight and judgement is improved. Pt denied depression and anxiety. Pt denied SI, HI, AVH. SW called pt's  Diogenes Duval [phone] (KELY signed) to discuss discharge planning.  stated that he wants to know what we are doing to treat the pt's COVID.  stated that the pt is not able to come home because if he gets COVID he will die. SW informed pt that we are not able to keep the pt in the hospital because she has COVID.  stated that the pt got COVID in the hospital and it is our responsibility to treat it, SW informed  that the pt does not meet criteria to be in the hospital because of her COVID symptoms.  stated that he wants the pt to be sent home with medications to help with the COVID. SW informed  that NP will be notified of this.  did not disclose that he has any questions/concerns about pt's mental health. Pt has follow up appointments scheduled at [facility]. Pt has a counseling appointment on 10/5 and medication management on 10/3.     In order to ensure appropriate transition and discharge planning is in place, the following documents have been transmitted to [facility], as the new outpatient provider:    The d/c diagnosis was transmitted to the next care provider  The reason for hospitalization was transmitted to the next care provider  The d/c medications (dosage and indication) were transmitted to the next care provider

## 2023-09-25 NOTE — DISCHARGE SUMMARY
DISCHARGE SUMMARY      Patient ID:  Donaldo Urban  [id number]  79 y.o.  1955    Admit date: 9/19/2023    Discharge date and time: 9/25/2023    Admitting Physician: Shalom Almanzar MD     Discharge Physician: Dr Raven De Guzman MD    Discharge Diagnoses:   Patient Active Problem List   Diagnosis    Retinal detachment with retinal defect    Vertigo    Other mixed anxiety disorders    Portal vein thrombosis    Intractable abdominal pain    Unable to ambulate    Left upper quadrant abdominal pain    Superior mesenteric vein thrombosis (HCC)    Brief psychotic disorder ([address])    Mood disorder ([address])    Severe protein-calorie malnutrition ([address])       Admission Condition: poor    Discharged Condition: stable    Admission Circumstance:   Patient presented to [facility] emergency department after making suicidal ideations with plan of starving herself to death due to inability to sleep, increased anxiety and vertigo      PAST MEDICAL/PSYCHIATRIC HISTORY:   Past Medical History:   Diagnosis Date    Abnormal Pap smear of cervix     Anxiety     Detached retina, right 08/17/2010,10/29/10    surgery, Dr. Paresh Tinoco [facility]        FAMILY/SOCIAL HISTORY:  Family History   Problem Relation Age of Onset    High Cholesterol Mother     Cancer Maternal Uncle         colon    Diabetes Maternal Grandmother     High Blood Pressure Maternal Grandmother     Cancer Maternal Uncle         colon     Social History     Socioeconomic History    Marital status:      Spouse name: Not on file    Number of children: Not on file    Years of education: Not on file    Highest education level: Not on file   Occupational History    Not on file   Tobacco Use    Smoking status: Never    Smokeless tobacco: Never   Vaping Use    Vaping Use: Never used   Substance and Sexual Activity    Alcohol use: No    Drug use: No    Sexual activity: Yes     Partners: Male   Other Topics Concern    Not on file   Social History Narrative    Not on file     Social Determinants

## 2023-09-25 NOTE — BH NOTE
Patient denies suicidal ideation, homicidal ideations and hallucinations. Presents flat, sad, worried. Patient is isolative to her room. Complains of sore throat, nasal congestion with bloody drainage, and chills. Patient was febrile with 99.9 temp, Ibuprofen administered. Dr. Franchesca Vazquez contacted to report patient symptoms and Covid nasal swab was ordered. Will continue to observe and support.

## 2023-09-25 NOTE — BH NOTE
Covid swab results came back positive. Dr. Jnae Ponce and Sera Lee were notified. Okayed to switch patient rooms around in order to have a private room available.

## 2023-09-26 ENCOUNTER — TELEPHONE (OUTPATIENT)
Dept: FAMILY MEDICINE CLINIC | Age: 68
End: 2023-09-26

## 2023-09-26 DIAGNOSIS — U07.1 COVID-19: Primary | ICD-10-CM

## 2023-09-26 NOTE — TELEPHONE ENCOUNTER
Pt's  Hannah Thuy called and stated that the patient Ivory Romo tested positive for COVID at the Er and they gav her nothing, he wanted to know if you could call both of them a script of the Paxlovid in since he was exposed to it as well and have it sent to [address] if possible, please advise

## 2023-10-04 ENCOUNTER — TELEPHONE (OUTPATIENT)
Dept: FAMILY MEDICINE CLINIC | Age: 68
End: 2023-10-04

## 2023-10-04 NOTE — TELEPHONE ENCOUNTER
Elisa's depression is getting worse and  wants to know if they should increase Ativan or the Pamelor?  Please advise

## 2023-10-09 ENCOUNTER — OFFICE VISIT (OUTPATIENT)
Dept: FAMILY MEDICINE CLINIC | Age: 68
End: 2023-10-09
Payer: MEDICARE

## 2023-10-09 VITALS
HEIGHT: 62 IN | TEMPERATURE: 97.3 F | OXYGEN SATURATION: 100 % | WEIGHT: 103.6 LBS | DIASTOLIC BLOOD PRESSURE: 78 MMHG | RESPIRATION RATE: 18 BRPM | SYSTOLIC BLOOD PRESSURE: 90 MMHG | BODY MASS INDEX: 19.06 KG/M2 | HEART RATE: 93 BPM

## 2023-10-09 DIAGNOSIS — R42 DIZZINESS: ICD-10-CM

## 2023-10-09 DIAGNOSIS — F41.9 ANXIETY: ICD-10-CM

## 2023-10-09 DIAGNOSIS — F32.2 SEVERE DEPRESSION (HCC): Primary | ICD-10-CM

## 2023-10-09 PROCEDURE — 90694 VACC AIIV4 NO PRSRV 0.5ML IM: CPT | Performed by: FAMILY MEDICINE

## 2023-10-09 PROCEDURE — G0008 ADMIN INFLUENZA VIRUS VAC: HCPCS | Performed by: FAMILY MEDICINE

## 2023-10-09 PROCEDURE — 1123F ACP DISCUSS/DSCN MKR DOCD: CPT | Performed by: FAMILY MEDICINE

## 2023-10-09 PROCEDURE — 99213 OFFICE O/P EST LOW 20 MIN: CPT | Performed by: FAMILY MEDICINE

## 2023-10-09 RX ORDER — NORTRIPTYLINE HYDROCHLORIDE 25 MG/1
25 CAPSULE ORAL
COMMUNITY
Start: 2023-09-26

## 2023-10-09 NOTE — PROGRESS NOTES
exam normal by observation, gait normal    Mental status - alert, oriented to person, place, and time, normal mood, behavior, speech, dress, motor activity, and thought processes      Assessment/Plan  Barbara Cuellar was seen today for discuss medications, dizziness and anxiety. Diagnoses and all orders for this visit:    Severe depression ([address])  Anxiety  - Reassured that she is not a candidate for inpatient admission.  - Advised her to call her nurse practitioner to discuss medication management. We will defer all psychiatric medications to them. Dizziness  - Advised that she needs to increase her fluid intake as her blood pressure is low today. This will help with her dizziness    Other orders  -     Influenza, FLUAD, (age 72 y+), IM, PF, 0.5 mL          No follow-ups on file. Carrie Dumont, DO    Call or go to ED immediately if symptoms worsen or persist.    Educational materials and/or home exercises printed for patient's review and were included in patient instructions on his/her After Visit Summary and given to patient at the end of visit. Counseled regarding above diagnosis, including possible risks and complications,  especially if left uncontrolled. Counseled regarding the possible side effects, risks, benefits and alternatives to treatment; patient and/or guardian verbalizes understanding, agrees, feels comfortable with and wishes to proceed with above treatment plan. Advised patient to call with any new medication issues, and read all Rx info from pharmacy to assure aware of all possible risks and side effects of medication before taking. Reviewed age and gender appropriate health screening exams and vaccinations.   Advised patient regarding importance of keeping up with recommended health maintenance and to schedule as soon as possible if overdue, as this is important in assessing for undiagnosed pathology, especially cancer, as well as protecting against potentially harmful/life

## 2023-10-23 ENCOUNTER — HOSPITAL ENCOUNTER (OUTPATIENT)
Age: 68
Discharge: HOME | End: 2023-10-23
Payer: MEDICARE

## 2023-10-23 LAB
ALBUMIN SERPL-MCNC: 4.2 G/DL (ref 3.5–5.2)
ALP SERPL-CCNC: 100 U/L (ref 35–104)
ALT SERPL-CCNC: 59 U/L (ref 0–32)
ANION GAP SERPL CALCULATED.3IONS-SCNC: 11 MMOL/L (ref 7–16)
AST SERPL-CCNC: 39 U/L (ref 0–31)
BASOPHILS # BLD: 0.05 K/UL (ref 0–0.2)
BASOPHILS NFR BLD: 1 % (ref 0–2)
BILIRUB SERPL-MCNC: 0.2 MG/DL (ref 0–1.2)
BUN SERPL-MCNC: 23 MG/DL (ref 6–23)
CALCIUM SERPL-MCNC: 9.5 MG/DL (ref 8.6–10.2)
CHLORIDE SERPL-SCNC: 104 MMOL/L (ref 98–107)
CHOLEST SERPL-MCNC: 203 MG/DL
CO2 SERPL-SCNC: 26 MMOL/L (ref 22–29)
CREAT SERPL-MCNC: 0.8 MG/DL (ref 0.5–1)
EOSINOPHIL # BLD: 0.14 K/UL (ref 0.05–0.5)
EOSINOPHILS RELATIVE PERCENT: 2 % (ref 0–6)
ERYTHROCYTE [DISTWIDTH] IN BLOOD BY AUTOMATED COUNT: 15 % (ref 11.5–15)
GFR SERPL CREATININE-BSD FRML MDRD: >60 ML/MIN/1.73M2
GLUCOSE SERPL-MCNC: 97 MG/DL (ref 74–99)
HCT VFR BLD AUTO: 38.3 % (ref 34–48)
HDLC SERPL-MCNC: 43 MG/DL
HGB BLD-MCNC: 12.5 G/DL (ref 11.5–15.5)
IMM GRANULOCYTES # BLD AUTO: 0.04 K/UL (ref 0–0.58)
IMM GRANULOCYTES NFR BLD: 1 % (ref 0–5)
LDLC SERPL CALC-MCNC: 127 MG/DL
LYMPHOCYTES NFR BLD: 0.92 K/UL (ref 1.5–4)
LYMPHOCYTES RELATIVE PERCENT: 14 % (ref 20–42)
MCH RBC QN AUTO: 30.9 PG (ref 26–35)
MCHC RBC AUTO-ENTMCNC: 32.6 G/DL (ref 32–34.5)
MCV RBC AUTO: 94.6 FL (ref 80–99.9)
MONOCYTES NFR BLD: 0.7 K/UL (ref 0.1–0.95)
MONOCYTES NFR BLD: 11 % (ref 2–12)
NEUTROPHILS NFR BLD: 71 % (ref 43–80)
NEUTS SEG NFR BLD: 4.65 K/UL (ref 1.8–7.3)
PLATELET # BLD AUTO: 270 K/UL (ref 130–450)
PMV BLD AUTO: 10 FL (ref 7–12)
POTASSIUM SERPL-SCNC: 4.5 MMOL/L (ref 3.5–5)
PROT SERPL-MCNC: 6.6 G/DL (ref 6.4–8.3)
RBC # BLD AUTO: 4.05 M/UL (ref 3.5–5.5)
SODIUM SERPL-SCNC: 141 MMOL/L (ref 132–146)
TRIGL SERPL-MCNC: 163 MG/DL
VALPROATE SERPL-MCNC: 12 UG/ML (ref 50–100)
VLDLC SERPL CALC-MCNC: 33 MG/DL
WBC OTHER # BLD: 6.5 K/UL (ref 4.5–11.5)

## 2023-10-23 PROCEDURE — 80061 LIPID PANEL: CPT

## 2023-10-23 PROCEDURE — 80164 ASSAY DIPROPYLACETIC ACD TOT: CPT

## 2023-10-23 PROCEDURE — 36415 COLL VENOUS BLD VENIPUNCTURE: CPT

## 2023-10-23 PROCEDURE — 80053 COMPREHEN METABOLIC PANEL: CPT

## 2023-10-23 PROCEDURE — 85025 COMPLETE CBC W/AUTO DIFF WBC: CPT

## 2023-11-15 ENCOUNTER — OFFICE VISIT (OUTPATIENT)
Dept: FAMILY MEDICINE CLINIC | Age: 68
End: 2023-11-15
Payer: MEDICARE

## 2023-11-15 VITALS
OXYGEN SATURATION: 98 % | HEIGHT: 62 IN | RESPIRATION RATE: 16 BRPM | SYSTOLIC BLOOD PRESSURE: 119 MMHG | TEMPERATURE: 97 F | BODY MASS INDEX: 20.24 KG/M2 | WEIGHT: 110 LBS | HEART RATE: 66 BPM | DIASTOLIC BLOOD PRESSURE: 78 MMHG

## 2023-11-15 DIAGNOSIS — Z00.00 INITIAL MEDICARE ANNUAL WELLNESS VISIT: Primary | ICD-10-CM

## 2023-11-15 DIAGNOSIS — K58.2 IRRITABLE BOWEL SYNDROME WITH BOTH CONSTIPATION AND DIARRHEA: ICD-10-CM

## 2023-11-15 PROCEDURE — G0438 PPPS, INITIAL VISIT: HCPCS | Performed by: FAMILY MEDICINE

## 2023-11-15 PROCEDURE — 1123F ACP DISCUSS/DSCN MKR DOCD: CPT | Performed by: FAMILY MEDICINE

## 2023-11-15 RX ORDER — HYOSCYAMINE SULFATE 0.12 MG/1
1 TABLET SUBLINGUAL EVERY 4 HOURS
Qty: 120 EACH | Refills: 0 | Status: SHIPPED | OUTPATIENT
Start: 2023-11-15

## 2023-11-15 ASSESSMENT — PATIENT HEALTH QUESTIONNAIRE - PHQ9
8. MOVING OR SPEAKING SO SLOWLY THAT OTHER PEOPLE COULD HAVE NOTICED. OR THE OPPOSITE, BEING SO FIGETY OR RESTLESS THAT YOU HAVE BEEN MOVING AROUND A LOT MORE THAN USUAL: 0
5. POOR APPETITE OR OVEREATING: 0
3. TROUBLE FALLING OR STAYING ASLEEP: 0
SUM OF ALL RESPONSES TO PHQ QUESTIONS 1-9: 0
6. FEELING BAD ABOUT YOURSELF - OR THAT YOU ARE A FAILURE OR HAVE LET YOURSELF OR YOUR FAMILY DOWN: 0
SUM OF ALL RESPONSES TO PHQ QUESTIONS 1-9: 0
2. FEELING DOWN, DEPRESSED OR HOPELESS: 0
SUM OF ALL RESPONSES TO PHQ QUESTIONS 1-9: 0
SUM OF ALL RESPONSES TO PHQ QUESTIONS 1-9: 0
7. TROUBLE CONCENTRATING ON THINGS, SUCH AS READING THE NEWSPAPER OR WATCHING TELEVISION: 0
9. THOUGHTS THAT YOU WOULD BE BETTER OFF DEAD, OR OF HURTING YOURSELF: 0
4. FEELING TIRED OR HAVING LITTLE ENERGY: 0
1. LITTLE INTEREST OR PLEASURE IN DOING THINGS: 0
10. IF YOU CHECKED OFF ANY PROBLEMS, HOW DIFFICULT HAVE THESE PROBLEMS MADE IT FOR YOU TO DO YOUR WORK, TAKE CARE OF THINGS AT HOME, OR GET ALONG WITH OTHER PEOPLE: 0
SUM OF ALL RESPONSES TO PHQ9 QUESTIONS 1 & 2: 0

## 2023-11-15 ASSESSMENT — COLUMBIA-SUICIDE SEVERITY RATING SCALE - C-SSRS
4. HAVE YOU HAD THESE THOUGHTS AND HAD SOME INTENTION OF ACTING ON THEM?: NO
7. DID THIS OCCUR IN THE LAST THREE MONTHS: NO
5. HAVE YOU STARTED TO WORK OUT OR WORKED OUT THE DETAILS OF HOW TO KILL YOURSELF? DO YOU INTEND TO CARRY OUT THIS PLAN?: NO
3. HAVE YOU BEEN THINKING ABOUT HOW YOU MIGHT KILL YOURSELF?: NO

## 2023-11-15 NOTE — PROGRESS NOTES
or organomegaly  Extremities: no cyanosis, clubbing or edema  Musculoskeletal: normal range of motion, no joint swelling, deformity or tenderness  Neurologic: reflexes normal and symmetric, no cranial nerve deficit, gait, coordination and speech normal       Allergies   Allergen Reactions    Xanax Xr [Alprazolam Er] Shortness Of Breath    Codeine      Felt like she was going to pass out. Dilaudid [Hydromorphone Hcl] Dizziness or Vertigo     Prior to Visit Medications    Medication Sig Taking?  Authorizing Provider   nortriptyline (PAMELOR) 25 MG capsule Take 1 capsule by mouth Yes Bharat Martinez MD   divalproex (DEPAKOTE) 250 MG DR tablet Take 1 tablet by mouth every 12 hours  Patient taking differently: Take 125 mg by mouth every 12 hours Take 2 caps PO Q12hrs  Carl De Leon APRN - CNP   OLANZapine (ZYPREXA) 5 MG tablet Take 1 tablet by mouth nightly  LanSCOTTY Mosqueda CNP   sodium chloride (OCEAN, BABY AYR) 0.65 % nasal spray 1 spray by Nasal route every 4 hours as needed for Congestion  Patient not taking: Reported on 10/9/2023  SCOTTY Reynoso CNP   apixaban (ELIQUIS) 5 MG TABS tablet Take 1 tablet by mouth 2 times daily  Patient not taking: Reported on 9/20/2023  Bharat Martinez MD   promethazine (PHENERGAN) 12.5 MG tablet Take 1 tablet by mouth every 6 hours as needed for Nausea  Patient not taking: Reported on 9/20/2023  Bharat Martinez MD   metoclopramide (REGLAN) 5 MG tablet Take 1 tablet by mouth 3 times daily  Patient not taking: Reported on 9/20/2023  Bharat Martinez MD CareTeam (Including outside providers/suppliers regularly involved in providing care):   Patient Care Team:  Jesse Ding DO as PCP - General (Family Medicine)  Jesse Ding DO as PCP - Empaneled Provider  Brad Bourgeois MD as Consulting Physician (Psychiatry)  Dia Vitale MD as Obstetrician (Obstetrics & Gynecology)     Reviewed and updated this visit:  Tobacco

## 2023-12-01 DIAGNOSIS — K58.2 IRRITABLE BOWEL SYNDROME WITH BOTH CONSTIPATION AND DIARRHEA: ICD-10-CM

## 2024-02-06 ENCOUNTER — HOSPITAL ENCOUNTER (EMERGENCY)
Age: 69
Discharge: HOME | End: 2024-02-06
Attending: EMERGENCY MEDICINE
Payer: MEDICARE

## 2024-02-06 VITALS
DIASTOLIC BLOOD PRESSURE: 76 MMHG | TEMPERATURE: 98.3 F | WEIGHT: 110 LBS | RESPIRATION RATE: 16 BRPM | OXYGEN SATURATION: 100 % | HEART RATE: 88 BPM | HEIGHT: 62 IN | SYSTOLIC BLOOD PRESSURE: 133 MMHG | BODY MASS INDEX: 20.24 KG/M2

## 2024-02-06 DIAGNOSIS — R11.0 NAUSEA: ICD-10-CM

## 2024-02-06 DIAGNOSIS — R10.12 ABDOMINAL PAIN, LEFT UPPER QUADRANT: Primary | ICD-10-CM

## 2024-02-06 LAB
ALBUMIN SERPL-MCNC: 4.6 G/DL (ref 3.5–5.2)
ALP SERPL-CCNC: 91 U/L (ref 35–104)
ALT SERPL-CCNC: 16 U/L (ref 0–32)
ANION GAP SERPL CALCULATED.3IONS-SCNC: 14 MMOL/L (ref 7–16)
AST SERPL-CCNC: 24 U/L (ref 0–31)
BASOPHILS # BLD: 0.06 K/UL (ref 0–0.2)
BASOPHILS NFR BLD: 1 % (ref 0–2)
BILIRUB SERPL-MCNC: 0.4 MG/DL (ref 0–1.2)
BILIRUB UR QL STRIP: NEGATIVE
BUN SERPL-MCNC: 24 MG/DL (ref 6–23)
CALCIUM SERPL-MCNC: 10.1 MG/DL (ref 8.6–10.2)
CHLORIDE SERPL-SCNC: 104 MMOL/L (ref 98–107)
CLARITY UR: CLEAR
CO2 SERPL-SCNC: 22 MMOL/L (ref 22–29)
COLOR UR: YELLOW
CREAT SERPL-MCNC: 0.9 MG/DL (ref 0.5–1)
CRYSTALS URNS MICRO: ABNORMAL /HPF
EKG ATRIAL RATE: 95 BPM
EKG P AXIS: 73 DEGREES
EKG P-R INTERVAL: 112 MS
EKG Q-T INTERVAL: 352 MS
EKG QRS DURATION: 80 MS
EKG QTC CALCULATION (BAZETT): 442 MS
EKG R AXIS: 81 DEGREES
EKG T AXIS: 46 DEGREES
EKG VENTRICULAR RATE: 95 BPM
EOSINOPHIL # BLD: 0.01 K/UL (ref 0.05–0.5)
EOSINOPHILS RELATIVE PERCENT: 0 % (ref 0–6)
ERYTHROCYTE [DISTWIDTH] IN BLOOD BY AUTOMATED COUNT: 13.1 % (ref 11.5–15)
GFR SERPL CREATININE-BSD FRML MDRD: >60 ML/MIN/1.73M2
GLUCOSE SERPL-MCNC: 106 MG/DL (ref 74–99)
GLUCOSE UR STRIP-MCNC: NEGATIVE MG/DL
HCT VFR BLD AUTO: 41.9 % (ref 34–48)
HGB BLD-MCNC: 14.4 G/DL (ref 11.5–15.5)
HGB UR QL STRIP.AUTO: NEGATIVE
IMM GRANULOCYTES # BLD AUTO: <0.03 K/UL (ref 0–0.58)
IMM GRANULOCYTES NFR BLD: 0 % (ref 0–5)
KETONES UR STRIP-MCNC: >80 MG/DL
LACTATE BLDV-SCNC: 1.3 MMOL/L (ref 0.5–2.2)
LEUKOCYTE ESTERASE UR QL STRIP: NEGATIVE
LIPASE SERPL-CCNC: 22 U/L (ref 13–60)
LYMPHOCYTES NFR BLD: 1.12 K/UL (ref 1.5–4)
LYMPHOCYTES RELATIVE PERCENT: 12 % (ref 20–42)
MAGNESIUM SERPL-MCNC: 2 MG/DL (ref 1.6–2.6)
MCH RBC QN AUTO: 29.5 PG (ref 26–35)
MCHC RBC AUTO-ENTMCNC: 34.4 G/DL (ref 32–34.5)
MCV RBC AUTO: 85.9 FL (ref 80–99.9)
MONOCYTES NFR BLD: 0.51 K/UL (ref 0.1–0.95)
MONOCYTES NFR BLD: 5 % (ref 2–12)
NEUTROPHILS NFR BLD: 82 % (ref 43–80)
NEUTS SEG NFR BLD: 7.71 K/UL (ref 1.8–7.3)
NITRITE UR QL STRIP: NEGATIVE
PH UR STRIP: 5.5 [PH] (ref 5–9)
PLATELET # BLD AUTO: 311 K/UL (ref 130–450)
PMV BLD AUTO: 10.7 FL (ref 7–12)
POTASSIUM SERPL-SCNC: 4.3 MMOL/L (ref 3.5–5)
PROT SERPL-MCNC: 7.4 G/DL (ref 6.4–8.3)
PROT UR STRIP-MCNC: ABNORMAL MG/DL
RBC # BLD AUTO: 4.88 M/UL (ref 3.5–5.5)
RBC #/AREA URNS HPF: ABNORMAL /HPF
SODIUM SERPL-SCNC: 140 MMOL/L (ref 132–146)
SP GR UR STRIP: >1.03 (ref 1–1.03)
TROPONIN I SERPL HS-MCNC: <6 NG/L (ref 0–9)
UROBILINOGEN UR STRIP-ACNC: 0.2 EU/DL (ref 0–1)
WBC #/AREA URNS HPF: ABNORMAL /HPF
WBC OTHER # BLD: 9.4 K/UL (ref 4.5–11.5)

## 2024-02-06 PROCEDURE — 83690 ASSAY OF LIPASE: CPT

## 2024-02-06 PROCEDURE — 80053 COMPREHEN METABOLIC PANEL: CPT

## 2024-02-06 PROCEDURE — 85025 COMPLETE CBC W/AUTO DIFF WBC: CPT

## 2024-02-06 PROCEDURE — 2500000003 HC RX 250 WO HCPCS: Performed by: EMERGENCY MEDICINE

## 2024-02-06 PROCEDURE — A4216 STERILE WATER/SALINE, 10 ML: HCPCS | Performed by: EMERGENCY MEDICINE

## 2024-02-06 PROCEDURE — 96365 THER/PROPH/DIAG IV INF INIT: CPT

## 2024-02-06 PROCEDURE — 84484 ASSAY OF TROPONIN QUANT: CPT

## 2024-02-06 PROCEDURE — 2580000003 HC RX 258: Performed by: EMERGENCY MEDICINE

## 2024-02-06 PROCEDURE — 96375 TX/PRO/DX INJ NEW DRUG ADDON: CPT

## 2024-02-06 PROCEDURE — 81001 URINALYSIS AUTO W/SCOPE: CPT

## 2024-02-06 PROCEDURE — 96361 HYDRATE IV INFUSION ADD-ON: CPT

## 2024-02-06 PROCEDURE — 83605 ASSAY OF LACTIC ACID: CPT

## 2024-02-06 PROCEDURE — 83735 ASSAY OF MAGNESIUM: CPT

## 2024-02-06 PROCEDURE — C9113 INJ PANTOPRAZOLE SODIUM, VIA: HCPCS | Performed by: EMERGENCY MEDICINE

## 2024-02-06 PROCEDURE — 6370000000 HC RX 637 (ALT 250 FOR IP): Performed by: EMERGENCY MEDICINE

## 2024-02-06 PROCEDURE — 93010 ELECTROCARDIOGRAM REPORT: CPT | Performed by: INTERNAL MEDICINE

## 2024-02-06 PROCEDURE — 93005 ELECTROCARDIOGRAM TRACING: CPT | Performed by: EMERGENCY MEDICINE

## 2024-02-06 PROCEDURE — 6360000002 HC RX W HCPCS: Performed by: EMERGENCY MEDICINE

## 2024-02-06 PROCEDURE — 99284 EMERGENCY DEPT VISIT MOD MDM: CPT

## 2024-02-06 RX ORDER — DIPHENHYDRAMINE HYDROCHLORIDE 50 MG/ML
25 INJECTION INTRAMUSCULAR; INTRAVENOUS ONCE
Status: COMPLETED | OUTPATIENT
Start: 2024-02-06 | End: 2024-02-06

## 2024-02-06 RX ORDER — 0.9 % SODIUM CHLORIDE 0.9 %
1000 INTRAVENOUS SOLUTION INTRAVENOUS ONCE
Status: COMPLETED | OUTPATIENT
Start: 2024-02-06 | End: 2024-02-06

## 2024-02-06 RX ORDER — PROCHLORPERAZINE EDISYLATE 5 MG/ML
5 INJECTION INTRAMUSCULAR; INTRAVENOUS ONCE
Status: COMPLETED | OUTPATIENT
Start: 2024-02-06 | End: 2024-02-06

## 2024-02-06 RX ORDER — ONDANSETRON 2 MG/ML
4 INJECTION INTRAMUSCULAR; INTRAVENOUS ONCE
Status: COMPLETED | OUTPATIENT
Start: 2024-02-06 | End: 2024-02-06

## 2024-02-06 RX ORDER — KETOROLAC TROMETHAMINE 30 MG/ML
15 INJECTION, SOLUTION INTRAMUSCULAR; INTRAVENOUS ONCE
Status: COMPLETED | OUTPATIENT
Start: 2024-02-06 | End: 2024-02-06

## 2024-02-06 RX ORDER — ONDANSETRON 4 MG/1
4 TABLET, ORALLY DISINTEGRATING ORAL 3 TIMES DAILY PRN
Qty: 21 TABLET | Refills: 0 | Status: SHIPPED | OUTPATIENT
Start: 2024-02-06

## 2024-02-06 RX ADMIN — ONDANSETRON 4 MG: 2 INJECTION INTRAMUSCULAR; INTRAVENOUS at 14:46

## 2024-02-06 RX ADMIN — KETOROLAC TROMETHAMINE 15 MG: 30 INJECTION, SOLUTION INTRAMUSCULAR; INTRAVENOUS at 14:47

## 2024-02-06 RX ADMIN — SODIUM CHLORIDE 1000 ML: 9 INJECTION, SOLUTION INTRAVENOUS at 14:47

## 2024-02-06 RX ADMIN — FAMOTIDINE 20 MG: 10 INJECTION, SOLUTION INTRAVENOUS at 16:48

## 2024-02-06 RX ADMIN — ALUMINUM HYDROXIDE, MAGNESIUM HYDROXIDE, AND SIMETHICONE: 200; 200; 20 SUSPENSION ORAL at 16:49

## 2024-02-06 RX ADMIN — DIPHENHYDRAMINE HYDROCHLORIDE 25 MG: 50 INJECTION INTRAMUSCULAR; INTRAVENOUS at 16:48

## 2024-02-06 RX ADMIN — PROCHLORPERAZINE EDISYLATE 5 MG: 5 INJECTION INTRAMUSCULAR; INTRAVENOUS at 16:48

## 2024-02-06 RX ADMIN — PANTOPRAZOLE SODIUM 40 MG: 40 INJECTION, POWDER, FOR SOLUTION INTRAVENOUS at 14:47

## 2024-02-06 ASSESSMENT — PAIN DESCRIPTION - ONSET: ONSET: ON-GOING

## 2024-02-06 ASSESSMENT — PAIN DESCRIPTION - LOCATION: LOCATION: ABDOMEN

## 2024-02-06 ASSESSMENT — PAIN DESCRIPTION - PAIN TYPE: TYPE: ACUTE PAIN

## 2024-02-06 ASSESSMENT — PAIN SCALES - GENERAL: PAINLEVEL_OUTOF10: 10

## 2024-02-06 ASSESSMENT — PAIN - FUNCTIONAL ASSESSMENT: PAIN_FUNCTIONAL_ASSESSMENT: 0-10

## 2024-02-06 ASSESSMENT — PAIN DESCRIPTION - FREQUENCY: FREQUENCY: CONTINUOUS

## 2024-02-06 ASSESSMENT — PAIN DESCRIPTION - DESCRIPTORS: DESCRIPTORS: BURNING;DISCOMFORT;ACHING

## 2024-02-06 NOTE — ED PROVIDER NOTES
current cardiac rhythm, and to interpret QT interval prior to administering medications. CBC is ordered to evaluate for any signs of infection or inflammation by obtaining a WBC count, or any signs of acute anemia by interpreting hemoglobin. CMP for any electrolyte imbalances, kidney function, hepatic injury or any elevations in anion gap. Troponin as a marker for myocardial ischemia or heart strain. Urinalysis to evaluate for a UTI. Lipase to evaluate for pancreatitis. Lactic acid as a marker of hypoperfusion or ischemia.      Amount and/or Complexity of Data Reviewed  External Data Reviewed: ECG and notes.  Labs: ordered. Decision-making details documented in ED Course.  ECG/medicine tests: ordered and independent interpretation performed. Decision-making details documented in ED Course.    Risk  Prescription drug management.             CONSULTS: (Who and What was discussed)  None         I am the Primary Clinician of Record.    FINAL IMPRESSION      1. Abdominal pain, left upper quadrant    2. Nausea          DISPOSITION/PLAN     DISPOSITION Decision To Discharge 02/06/2024 07:17:57 PM      PATIENT REFERRED TO:  Carrie Neal DO  [address] [facility] .  [address]  [facility] [id number] [phone]    Schedule an appointment as soon as possible for a visit       Nicola Hendricks MD  [address] [facility] [address] [phone]    Schedule an appointment as soon as possible for a visit         DISCHARGE MEDICATIONS:  New Prescriptions    ONDANSETRON (ZOFRAN-ODT) 4 MG DISINTEGRATING TABLET    Take 1 tablet by mouth 3 times daily as needed for Nausea or Vomiting       DISCONTINUED MEDICATIONS:  Discontinued Medications    SODIUM CHLORIDE (OCEAN, BABY AYR) 0.65 % NASAL SPRAY    1 spray by Nasal route every 4 hours as needed for Congestion              (Please note that portions of this note were completed with a voice recognition program.  Efforts were made to edit the dictations but occasionally words are

## 2024-02-08 ENCOUNTER — TELEPHONE (OUTPATIENT)
Dept: FAMILY MEDICINE CLINIC | Age: 69
End: 2024-02-08

## 2024-02-08 NOTE — TELEPHONE ENCOUNTER
Pt called in stating that she was in the ER  on 02/06/2024 and needed a follow up appt with you, but there is nothing available.     Please advise.

## 2024-02-15 ENCOUNTER — OFFICE VISIT (OUTPATIENT)
Dept: FAMILY MEDICINE CLINIC | Age: 69
End: 2024-02-15
Payer: MEDICARE

## 2024-02-15 VITALS
RESPIRATION RATE: 18 BRPM | SYSTOLIC BLOOD PRESSURE: 122 MMHG | HEIGHT: 62 IN | DIASTOLIC BLOOD PRESSURE: 70 MMHG | WEIGHT: 106.1 LBS | OXYGEN SATURATION: 99 % | HEART RATE: 62 BPM | BODY MASS INDEX: 19.53 KG/M2 | TEMPERATURE: 97.6 F

## 2024-02-15 DIAGNOSIS — K21.9 GASTROESOPHAGEAL REFLUX DISEASE, UNSPECIFIED WHETHER ESOPHAGITIS PRESENT: Primary | ICD-10-CM

## 2024-02-15 PROCEDURE — 1123F ACP DISCUSS/DSCN MKR DOCD: CPT | Performed by: FAMILY MEDICINE

## 2024-02-15 PROCEDURE — 99213 OFFICE O/P EST LOW 20 MIN: CPT | Performed by: FAMILY MEDICINE

## 2024-02-15 RX ORDER — OLANZAPINE 5 MG/1
TABLET ORAL
COMMUNITY
Start: 2023-11-22

## 2024-02-15 RX ORDER — PANTOPRAZOLE SODIUM 40 MG/1
40 TABLET, DELAYED RELEASE ORAL
Qty: 90 TABLET | Refills: 1 | Status: SHIPPED | OUTPATIENT
Start: 2024-02-15

## 2024-02-15 ASSESSMENT — PATIENT HEALTH QUESTIONNAIRE - PHQ9
6. FEELING BAD ABOUT YOURSELF - OR THAT YOU ARE A FAILURE OR HAVE LET YOURSELF OR YOUR FAMILY DOWN: 0
SUM OF ALL RESPONSES TO PHQ QUESTIONS 1-9: 0
SUM OF ALL RESPONSES TO PHQ QUESTIONS 1-9: 0
SUM OF ALL RESPONSES TO PHQ9 QUESTIONS 1 & 2: 0
SUM OF ALL RESPONSES TO PHQ QUESTIONS 1-9: 0
8. MOVING OR SPEAKING SO SLOWLY THAT OTHER PEOPLE COULD HAVE NOTICED. OR THE OPPOSITE, BEING SO FIGETY OR RESTLESS THAT YOU HAVE BEEN MOVING AROUND A LOT MORE THAN USUAL: 0
9. THOUGHTS THAT YOU WOULD BE BETTER OFF DEAD, OR OF HURTING YOURSELF: 0
7. TROUBLE CONCENTRATING ON THINGS, SUCH AS READING THE NEWSPAPER OR WATCHING TELEVISION: 0
5. POOR APPETITE OR OVEREATING: 0
10. IF YOU CHECKED OFF ANY PROBLEMS, HOW DIFFICULT HAVE THESE PROBLEMS MADE IT FOR YOU TO DO YOUR WORK, TAKE CARE OF THINGS AT HOME, OR GET ALONG WITH OTHER PEOPLE: 0
2. FEELING DOWN, DEPRESSED OR HOPELESS: 0
SUM OF ALL RESPONSES TO PHQ QUESTIONS 1-9: 0
1. LITTLE INTEREST OR PLEASURE IN DOING THINGS: 0
4. FEELING TIRED OR HAVING LITTLE ENERGY: 0
3. TROUBLE FALLING OR STAYING ASLEEP: 0

## 2024-02-15 NOTE — PROGRESS NOTES
2/15/2024    Chief Complaint   Patient presents with    Gastroesophageal Reflux    Nausea       HPI    Elisa Toth is a 69 y.o. patient that presents today for:    2/2 burst of nausea, gagging and acid buildup.  Has been watching her diet and eating lighter. Weight is down 4 lbs.   2/6 went to ER due to worsening of GI sx. Labs reviewed. UA showed ketones.  She was given IV fluids stabilized and discharged home  Did notice a burst yesterday at home.  States that she did not eat anything that caused the episode.  Has been trying to eat simple foods that are easier on her stomach.  Denies nausea, vomiting, diarrhea      Patient's past medical, surgical, social and/or family history reviewed, updated in chart, and are non-contributory (unless otherwise stated).  Medications and allergies also reviewed and updated in chart.     ROS negative unless otherwise specified    Physical Exam  Temp Readings from Last 3 Encounters:   02/15/24 97.6 °F (36.4 °C)   02/06/24 98.3 °F (36.8 °C) (Oral)   11/15/23 97 °F (36.1 °C) (Temporal)     Wt Readings from Last 3 Encounters:   02/15/24 48.1 kg (106 lb 1.6 oz)   02/06/24 49.9 kg (110 lb)   11/15/23 49.9 kg (110 lb)     BP Readings from Last 3 Encounters:   02/15/24 122/70   02/06/24 133/76   11/15/23 119/78     Pulse Readings from Last 3 Encounters:   02/15/24 62   02/06/24 88   11/15/23 66       General appearance: alert, well appearing, and in no distress, oriented to person, place, and time and normal appearing weight.    CVS exam: normal rate, regular rhythm, normal S1, S2, no murmurs, rubs, clicks or gallops.  Radial pulses 2+ bilateral.  PT/DP pulse 2+ bilat.  No C/C/E    Chest: clear to auscultation, no wheezes, rales or rhonchi, symmetric air entry.     Abdomen: Soft, non-tender, non-distended, positive BS in all 4 quadrants    Extremities:Dorsalis pedis pulses palpated bilaterally, no clubbing, cyanosis, edema or erythema,     SKIN: no lesions, jaundice,

## 2024-03-02 ENCOUNTER — APPOINTMENT (OUTPATIENT)
Dept: CT IMAGING | Age: 69
End: 2024-03-02
Attending: EMERGENCY MEDICINE
Payer: MEDICARE

## 2024-03-02 ENCOUNTER — HOSPITAL ENCOUNTER (EMERGENCY)
Age: 69
Discharge: HOME | End: 2024-03-02
Attending: EMERGENCY MEDICINE
Payer: MEDICARE

## 2024-03-02 VITALS
RESPIRATION RATE: 20 BRPM | TEMPERATURE: 98.2 F | BODY MASS INDEX: 19.39 KG/M2 | WEIGHT: 106 LBS | DIASTOLIC BLOOD PRESSURE: 73 MMHG | OXYGEN SATURATION: 97 % | HEART RATE: 67 BPM | SYSTOLIC BLOOD PRESSURE: 103 MMHG

## 2024-03-02 DIAGNOSIS — R11.0 NAUSEA: Primary | ICD-10-CM

## 2024-03-02 DIAGNOSIS — K59.00 CONSTIPATION, UNSPECIFIED CONSTIPATION TYPE: ICD-10-CM

## 2024-03-02 LAB
ALBUMIN SERPL-MCNC: 4.2 G/DL (ref 3.5–5.2)
ALP SERPL-CCNC: 86 U/L (ref 35–104)
ALT SERPL-CCNC: 14 U/L (ref 0–32)
ANION GAP SERPL CALCULATED.3IONS-SCNC: 12 MMOL/L (ref 7–16)
AST SERPL-CCNC: 16 U/L (ref 0–31)
BASOPHILS # BLD: 0.04 K/UL (ref 0–0.2)
BASOPHILS NFR BLD: 1 % (ref 0–2)
BILIRUB SERPL-MCNC: 0.4 MG/DL (ref 0–1.2)
BUN SERPL-MCNC: 15 MG/DL (ref 6–23)
CALCIUM SERPL-MCNC: 9.3 MG/DL (ref 8.6–10.2)
CHLORIDE SERPL-SCNC: 104 MMOL/L (ref 98–107)
CO2 SERPL-SCNC: 25 MMOL/L (ref 22–29)
CREAT SERPL-MCNC: 0.7 MG/DL (ref 0.5–1)
EOSINOPHIL # BLD: 0.01 K/UL (ref 0.05–0.5)
EOSINOPHILS RELATIVE PERCENT: 0 % (ref 0–6)
ERYTHROCYTE [DISTWIDTH] IN BLOOD BY AUTOMATED COUNT: 13.3 % (ref 11.5–15)
GFR SERPL CREATININE-BSD FRML MDRD: >60 ML/MIN/1.73M2
GLUCOSE SERPL-MCNC: 104 MG/DL (ref 74–99)
HCT VFR BLD AUTO: 39.7 % (ref 34–48)
HGB BLD-MCNC: 13.6 G/DL (ref 11.5–15.5)
IMM GRANULOCYTES # BLD AUTO: <0.03 K/UL (ref 0–0.58)
IMM GRANULOCYTES NFR BLD: 0 % (ref 0–5)
LACTATE BLDV-SCNC: 1.2 MMOL/L (ref 0.5–2.2)
LIPASE SERPL-CCNC: 23 U/L (ref 13–60)
LYMPHOCYTES NFR BLD: 0.74 K/UL (ref 1.5–4)
LYMPHOCYTES RELATIVE PERCENT: 12 % (ref 20–42)
MCH RBC QN AUTO: 30.1 PG (ref 26–35)
MCHC RBC AUTO-ENTMCNC: 34.3 G/DL (ref 32–34.5)
MCV RBC AUTO: 87.8 FL (ref 80–99.9)
MONOCYTES NFR BLD: 0.41 K/UL (ref 0.1–0.95)
MONOCYTES NFR BLD: 7 % (ref 2–12)
NEUTROPHILS NFR BLD: 80 % (ref 43–80)
NEUTS SEG NFR BLD: 4.82 K/UL (ref 1.8–7.3)
PLATELET # BLD AUTO: 311 K/UL (ref 130–450)
PMV BLD AUTO: 10.1 FL (ref 7–12)
POTASSIUM SERPL-SCNC: 3.9 MMOL/L (ref 3.5–5)
PROT SERPL-MCNC: 6.4 G/DL (ref 6.4–8.3)
RBC # BLD AUTO: 4.52 M/UL (ref 3.5–5.5)
SODIUM SERPL-SCNC: 141 MMOL/L (ref 132–146)
TROPONIN I SERPL HS-MCNC: 8 NG/L (ref 0–9)
WBC OTHER # BLD: 6 K/UL (ref 4.5–11.5)

## 2024-03-02 PROCEDURE — 99285 EMERGENCY DEPT VISIT HI MDM: CPT

## 2024-03-02 PROCEDURE — 74177 CT ABD & PELVIS W/CONTRAST: CPT

## 2024-03-02 PROCEDURE — 80053 COMPREHEN METABOLIC PANEL: CPT

## 2024-03-02 PROCEDURE — 96375 TX/PRO/DX INJ NEW DRUG ADDON: CPT

## 2024-03-02 PROCEDURE — 2580000003 HC RX 258: Performed by: EMERGENCY MEDICINE

## 2024-03-02 PROCEDURE — 6360000004 HC RX CONTRAST MEDICATION: Performed by: RADIOLOGY

## 2024-03-02 PROCEDURE — 83690 ASSAY OF LIPASE: CPT

## 2024-03-02 PROCEDURE — 96372 THER/PROPH/DIAG INJ SC/IM: CPT

## 2024-03-02 PROCEDURE — 96374 THER/PROPH/DIAG INJ IV PUSH: CPT

## 2024-03-02 PROCEDURE — 85025 COMPLETE CBC W/AUTO DIFF WBC: CPT

## 2024-03-02 PROCEDURE — 6360000002 HC RX W HCPCS: Performed by: EMERGENCY MEDICINE

## 2024-03-02 PROCEDURE — 93005 ELECTROCARDIOGRAM TRACING: CPT | Performed by: EMERGENCY MEDICINE

## 2024-03-02 PROCEDURE — 84484 ASSAY OF TROPONIN QUANT: CPT

## 2024-03-02 PROCEDURE — 83605 ASSAY OF LACTIC ACID: CPT

## 2024-03-02 RX ORDER — ONDANSETRON 2 MG/ML
4 INJECTION INTRAMUSCULAR; INTRAVENOUS ONCE
Status: COMPLETED | OUTPATIENT
Start: 2024-03-02 | End: 2024-03-02

## 2024-03-02 RX ORDER — DICYCLOMINE HYDROCHLORIDE 10 MG/ML
20 INJECTION INTRAMUSCULAR ONCE
Status: COMPLETED | OUTPATIENT
Start: 2024-03-02 | End: 2024-03-02

## 2024-03-02 RX ORDER — METOCLOPRAMIDE 10 MG/1
10 TABLET ORAL EVERY 6 HOURS PRN
Qty: 15 TABLET | Refills: 0 | Status: SHIPPED | OUTPATIENT
Start: 2024-03-02

## 2024-03-02 RX ORDER — METOCLOPRAMIDE HYDROCHLORIDE 5 MG/ML
10 INJECTION INTRAMUSCULAR; INTRAVENOUS ONCE
Status: COMPLETED | OUTPATIENT
Start: 2024-03-02 | End: 2024-03-02

## 2024-03-02 RX ORDER — 0.9 % SODIUM CHLORIDE 0.9 %
1000 INTRAVENOUS SOLUTION INTRAVENOUS ONCE
Status: COMPLETED | OUTPATIENT
Start: 2024-03-02 | End: 2024-03-02

## 2024-03-02 RX ADMIN — DICYCLOMINE HYDROCHLORIDE 20 MG: 10 INJECTION, SOLUTION INTRAMUSCULAR at 17:13

## 2024-03-02 RX ADMIN — METOCLOPRAMIDE 10 MG: 5 INJECTION, SOLUTION INTRAMUSCULAR; INTRAVENOUS at 16:20

## 2024-03-02 RX ADMIN — SODIUM CHLORIDE 1000 ML: 9 INJECTION, SOLUTION INTRAVENOUS at 13:06

## 2024-03-02 RX ADMIN — ONDANSETRON 4 MG: 2 INJECTION INTRAMUSCULAR; INTRAVENOUS at 13:08

## 2024-03-02 RX ADMIN — IOPAMIDOL 75 ML: 755 INJECTION, SOLUTION INTRAVENOUS at 15:28

## 2024-03-02 ASSESSMENT — PAIN SCALES - GENERAL: PAINLEVEL_OUTOF10: 10

## 2024-03-02 ASSESSMENT — PAIN - FUNCTIONAL ASSESSMENT: PAIN_FUNCTIONAL_ASSESSMENT: NONE - DENIES PAIN

## 2024-03-02 ASSESSMENT — PAIN DESCRIPTION - LOCATION: LOCATION: ABDOMEN

## 2024-03-02 NOTE — ED PROVIDER NOTES
HPI:  3/2/24, Time: 12:43 PM RISA Toth is a 69 y.o. female presenting to the ED for nausea that has been going on for 1 month.  Patient states that she underwent EGD by Dion perales in their office on Tuesday and was told that she was constipated.  She states that she was told that her EGD was otherwise normal.  She states she has not been able to eat due to the nausea.  She denies any pain.  States been constipated and only been able to go a small amount.  She has been taking Linzess without relief.  She denies chest pain, shortness of breath, emesis, and black or bloody stools.    --------------------------------------------- PAST HISTORY ---------------------------------------------  Past Medical History:  has a past medical history of Abnormal Pap smear of cervix, Anxiety, Detached retina, right, and IBS (irritable bowel syndrome).    Past Surgical History:  has a past surgical history that includes Tonsillectomy and adenoidectomy; Cataract removal (07/17/11); and LEEP.    Social History:  reports that she has never smoked. She has never used smokeless tobacco. She reports that she does not drink alcohol and does not use drugs.    Family History: family history includes Cancer in her maternal uncle and maternal uncle; Diabetes in her maternal grandmother; High Blood Pressure in her maternal grandmother; High Cholesterol in her mother.     The patient’s home medications have been reviewed.    Allergies: Xanax xr [alprazolam er], Codeine, and Dilaudid [hydromorphone hcl]    -------------------------------------------------- RESULTS -------------------------------------------------  All laboratory and radiology results have been personally reviewed by myself   LABS:  Results for orders placed or performed during the hospital encounter of 03/02/24   CBC with Auto Differential   Result Value Ref Range    WBC 6.0 4.5 - 11.5 k/uL    RBC 4.52 3.50 - 5.50 m/uL    Hemoglobin 13.6 11.5 - 15.5 g/dL     (REGLAN) injection 10 mg (10 mg IntraVENous Given 3/2/24 1620)   dicyclomine (BENTYL) injection 20 mg (20 mg IntraMUSCular Given 3/2/24 1713)       Medical Decision Making/ED COURSE:    History From: patient     Patient is a 69 y.o. female presenting to the ED for acute onset nausea and constipation, moderate in severity. In the ED, patient was hemodynamically stable and afebrile. Labs and imaging obtained. Differential diagnosis includes constipation, bowel obstruction, mass, dehydration, electrolyte abnormality. Patient administered IV fluids and IV zofran.    I reviewed and interpreted labs. CBC and CMP was unremarkable with no acute findings. No leukocytosis or anemia. CMP showed normal electrolytes and baseline kidney function. Lipase normal. No lactic acidosis. Troponin negative.     CT abdomen/pelvis with contrast no emergent findings.  Discussed incidental findings.    No emergent findings in the ED. patient given IV Reglan for pain control.  Patient also requesting enema.  Patient will follow-up with her PCP and GI.  Written a prescription for Reglan for home.  Supportive care measures and strict ED return precautions discussed      CONSULTS: (Who and What was discussed)  None    Social Determinants of Health : None    Chronic Conditions affecting care:    has a past medical history of Abnormal Pap smear of cervix, Anxiety, Detached retina, right (08/17/2010,10/29/10), and IBS (irritable bowel syndrome).     Medical Decision Making  Problems Addressed:  Constipation, unspecified constipation type: acute illness or injury  Nausea: acute illness or injury    Amount and/or Complexity of Data Reviewed  External Data Reviewed: notes.  Labs: ordered. Decision-making details documented in ED Course.  Radiology: ordered. Decision-making details documented in ED Course.  ECG/medicine tests: ordered and independent interpretation performed. Decision-making details documented in ED Course.    Risk  Prescription drug

## 2024-03-02 NOTE — ED NOTES
Pt ambulated to bathroom. Stable gait, no signs of distress, denies SOB or dizziness. Complaining of nausea while walking

## 2024-03-03 LAB
EKG ATRIAL RATE: 82 BPM
EKG P AXIS: 76 DEGREES
EKG P-R INTERVAL: 112 MS
EKG Q-T INTERVAL: 372 MS
EKG QRS DURATION: 78 MS
EKG QTC CALCULATION (BAZETT): 434 MS
EKG R AXIS: 83 DEGREES
EKG T AXIS: 67 DEGREES
EKG VENTRICULAR RATE: 82 BPM

## 2024-03-16 DIAGNOSIS — F41.9 ANXIETY: ICD-10-CM

## 2024-03-19 RX ORDER — LORAZEPAM 0.5 MG/1
TABLET ORAL
Qty: 30 TABLET | Refills: 2 | Status: SHIPPED
Start: 2024-03-19 | End: 2024-03-20

## 2024-03-20 ENCOUNTER — APPOINTMENT (OUTPATIENT)
Dept: GENERAL RADIOLOGY | Age: 69
End: 2024-03-20
Payer: MEDICARE

## 2024-03-20 ENCOUNTER — HOSPITAL ENCOUNTER (OUTPATIENT)
Age: 69
Setting detail: OBSERVATION
Discharge: HOME | End: 2024-03-21
Attending: EMERGENCY MEDICINE | Admitting: INTERNAL MEDICINE
Payer: MEDICARE

## 2024-03-20 ENCOUNTER — OFFICE VISIT (OUTPATIENT)
Dept: FAMILY MEDICINE CLINIC | Age: 69
End: 2024-03-20
Payer: MEDICARE

## 2024-03-20 VITALS
SYSTOLIC BLOOD PRESSURE: 112 MMHG | OXYGEN SATURATION: 98 % | TEMPERATURE: 97.9 F | RESPIRATION RATE: 16 BRPM | BODY MASS INDEX: 18 KG/M2 | HEART RATE: 105 BPM | HEIGHT: 62 IN | WEIGHT: 97.8 LBS | DIASTOLIC BLOOD PRESSURE: 68 MMHG

## 2024-03-20 DIAGNOSIS — R07.9 ACUTE CHEST PAIN: Primary | ICD-10-CM

## 2024-03-20 DIAGNOSIS — I20.89 ANGINA AT REST (HCC): Primary | ICD-10-CM

## 2024-03-20 DIAGNOSIS — Z00.00 MEDICARE ANNUAL WELLNESS VISIT, SUBSEQUENT: ICD-10-CM

## 2024-03-20 LAB
ALBUMIN SERPL-MCNC: 4.6 G/DL (ref 3.5–5.2)
ALP SERPL-CCNC: 86 U/L (ref 35–104)
ALT SERPL-CCNC: 12 U/L (ref 0–32)
ANION GAP SERPL CALCULATED.3IONS-SCNC: 17 MMOL/L (ref 7–16)
AST SERPL-CCNC: 16 U/L (ref 0–31)
B PARAP IS1001 DNA NPH QL NAA+NON-PROBE: NOT DETECTED
B PERT DNA SPEC QL NAA+PROBE: NOT DETECTED
BASOPHILS # BLD: 0.03 K/UL (ref 0–0.2)
BASOPHILS NFR BLD: 1 % (ref 0–2)
BILIRUB SERPL-MCNC: 0.4 MG/DL (ref 0–1.2)
BNP SERPL-MCNC: <36 PG/ML (ref 0–125)
BUN SERPL-MCNC: 27 MG/DL (ref 6–23)
C PNEUM DNA NPH QL NAA+NON-PROBE: NOT DETECTED
CALCIUM SERPL-MCNC: 9.9 MG/DL (ref 8.6–10.2)
CHLORIDE SERPL-SCNC: 103 MMOL/L (ref 98–107)
CO2 SERPL-SCNC: 21 MMOL/L (ref 22–29)
CREAT SERPL-MCNC: 0.9 MG/DL (ref 0.5–1)
D DIMER: <200 NG/ML DDU (ref 0–232)
EKG ATRIAL RATE: 93 BPM
EKG P AXIS: 86 DEGREES
EKG P-R INTERVAL: 116 MS
EKG Q-T INTERVAL: 354 MS
EKG QRS DURATION: 80 MS
EKG QTC CALCULATION (BAZETT): 440 MS
EKG R AXIS: 98 DEGREES
EKG T AXIS: 63 DEGREES
EKG VENTRICULAR RATE: 93 BPM
EOSINOPHIL # BLD: 0.03 K/UL (ref 0.05–0.5)
EOSINOPHILS RELATIVE PERCENT: 1 % (ref 0–6)
ERYTHROCYTE [DISTWIDTH] IN BLOOD BY AUTOMATED COUNT: 13.7 % (ref 11.5–15)
FLUAV RNA NPH QL NAA+NON-PROBE: NOT DETECTED
FLUBV RNA NPH QL NAA+NON-PROBE: NOT DETECTED
GFR SERPL CREATININE-BSD FRML MDRD: >60 ML/MIN/1.73M2
GLUCOSE SERPL-MCNC: 84 MG/DL (ref 74–99)
HADV DNA NPH QL NAA+NON-PROBE: NOT DETECTED
HCOV 229E RNA NPH QL NAA+NON-PROBE: NOT DETECTED
HCOV HKU1 RNA NPH QL NAA+NON-PROBE: NOT DETECTED
HCOV NL63 RNA NPH QL NAA+NON-PROBE: NOT DETECTED
HCOV OC43 RNA NPH QL NAA+NON-PROBE: NOT DETECTED
HCT VFR BLD AUTO: 41.7 % (ref 34–48)
HGB BLD-MCNC: 14.5 G/DL (ref 11.5–15.5)
HMPV RNA NPH QL NAA+NON-PROBE: NOT DETECTED
HPIV1 RNA NPH QL NAA+NON-PROBE: NOT DETECTED
HPIV2 RNA NPH QL NAA+NON-PROBE: NOT DETECTED
HPIV3 RNA NPH QL NAA+NON-PROBE: NOT DETECTED
HPIV4 RNA NPH QL NAA+NON-PROBE: NOT DETECTED
IMM GRANULOCYTES # BLD AUTO: <0.03 K/UL (ref 0–0.58)
IMM GRANULOCYTES NFR BLD: 0 % (ref 0–5)
LYMPHOCYTES NFR BLD: 0.83 K/UL (ref 1.5–4)
LYMPHOCYTES RELATIVE PERCENT: 13 % (ref 20–42)
M PNEUMO DNA NPH QL NAA+NON-PROBE: NOT DETECTED
MAGNESIUM SERPL-MCNC: 2 MG/DL (ref 1.6–2.6)
MCH RBC QN AUTO: 31 PG (ref 26–35)
MCHC RBC AUTO-ENTMCNC: 34.8 G/DL (ref 32–34.5)
MCV RBC AUTO: 89.1 FL (ref 80–99.9)
MONOCYTES NFR BLD: 0.48 K/UL (ref 0.1–0.95)
MONOCYTES NFR BLD: 8 % (ref 2–12)
NEUTROPHILS NFR BLD: 78 % (ref 43–80)
NEUTS SEG NFR BLD: 4.81 K/UL (ref 1.8–7.3)
PLATELET # BLD AUTO: 305 K/UL (ref 130–450)
PMV BLD AUTO: 10.2 FL (ref 7–12)
POTASSIUM SERPL-SCNC: 4.3 MMOL/L (ref 3.5–5)
PROT SERPL-MCNC: 7.3 G/DL (ref 6.4–8.3)
RBC # BLD AUTO: 4.68 M/UL (ref 3.5–5.5)
RSV RNA NPH QL NAA+NON-PROBE: NOT DETECTED
RV+EV RNA NPH QL NAA+NON-PROBE: NOT DETECTED
SARS-COV-2 RNA NPH QL NAA+NON-PROBE: NOT DETECTED
SODIUM SERPL-SCNC: 141 MMOL/L (ref 132–146)
SPECIMEN DESCRIPTION: NORMAL
TROPONIN I SERPL HS-MCNC: 10 NG/L (ref 0–9)
TROPONIN I SERPL HS-MCNC: 10 NG/L (ref 0–9)
WBC OTHER # BLD: 6.2 K/UL (ref 4.5–11.5)

## 2024-03-20 PROCEDURE — 6360000002 HC RX W HCPCS: Performed by: INTERNAL MEDICINE

## 2024-03-20 PROCEDURE — 83735 ASSAY OF MAGNESIUM: CPT

## 2024-03-20 PROCEDURE — 6370000000 HC RX 637 (ALT 250 FOR IP): Performed by: EMERGENCY MEDICINE

## 2024-03-20 PROCEDURE — 85025 COMPLETE CBC W/AUTO DIFF WBC: CPT

## 2024-03-20 PROCEDURE — 84484 ASSAY OF TROPONIN QUANT: CPT

## 2024-03-20 PROCEDURE — G0378 HOSPITAL OBSERVATION PER HR: HCPCS

## 2024-03-20 PROCEDURE — 83880 ASSAY OF NATRIURETIC PEPTIDE: CPT

## 2024-03-20 PROCEDURE — 71045 X-RAY EXAM CHEST 1 VIEW: CPT

## 2024-03-20 PROCEDURE — 80053 COMPREHEN METABOLIC PANEL: CPT

## 2024-03-20 PROCEDURE — 93005 ELECTROCARDIOGRAM TRACING: CPT | Performed by: EMERGENCY MEDICINE

## 2024-03-20 PROCEDURE — 85379 FIBRIN DEGRADATION QUANT: CPT

## 2024-03-20 PROCEDURE — 96372 THER/PROPH/DIAG INJ SC/IM: CPT

## 2024-03-20 PROCEDURE — 2580000003 HC RX 258: Performed by: INTERNAL MEDICINE

## 2024-03-20 PROCEDURE — 6370000000 HC RX 637 (ALT 250 FOR IP): Performed by: INTERNAL MEDICINE

## 2024-03-20 PROCEDURE — 93000 ELECTROCARDIOGRAM COMPLETE: CPT | Performed by: FAMILY MEDICINE

## 2024-03-20 PROCEDURE — 0202U NFCT DS 22 TRGT SARS-COV-2: CPT

## 2024-03-20 PROCEDURE — 93010 ELECTROCARDIOGRAM REPORT: CPT | Performed by: INTERNAL MEDICINE

## 2024-03-20 PROCEDURE — 1123F ACP DISCUSS/DSCN MKR DOCD: CPT | Performed by: FAMILY MEDICINE

## 2024-03-20 PROCEDURE — 2580000003 HC RX 258: Performed by: EMERGENCY MEDICINE

## 2024-03-20 PROCEDURE — 96360 HYDRATION IV INFUSION INIT: CPT

## 2024-03-20 PROCEDURE — G0439 PPPS, SUBSEQ VISIT: HCPCS | Performed by: FAMILY MEDICINE

## 2024-03-20 PROCEDURE — 99285 EMERGENCY DEPT VISIT HI MDM: CPT

## 2024-03-20 RX ORDER — NORTRIPTYLINE HYDROCHLORIDE 25 MG/1
25 CAPSULE ORAL DAILY
Status: DISCONTINUED | OUTPATIENT
Start: 2024-03-20 | End: 2024-03-21 | Stop reason: HOSPADM

## 2024-03-20 RX ORDER — MAGNESIUM SULFATE IN WATER 40 MG/ML
2000 INJECTION, SOLUTION INTRAVENOUS PRN
Status: DISCONTINUED | OUTPATIENT
Start: 2024-03-20 | End: 2024-03-21 | Stop reason: HOSPADM

## 2024-03-20 RX ORDER — LORAZEPAM 0.5 MG/1
0.5 TABLET ORAL NIGHTLY PRN
Status: DISCONTINUED | OUTPATIENT
Start: 2024-03-20 | End: 2024-03-21 | Stop reason: HOSPADM

## 2024-03-20 RX ORDER — SODIUM CHLORIDE 9 MG/ML
INJECTION, SOLUTION INTRAVENOUS PRN
Status: DISCONTINUED | OUTPATIENT
Start: 2024-03-20 | End: 2024-03-21 | Stop reason: HOSPADM

## 2024-03-20 RX ORDER — ACETAMINOPHEN 650 MG/1
650 SUPPOSITORY RECTAL EVERY 6 HOURS PRN
Status: DISCONTINUED | OUTPATIENT
Start: 2024-03-20 | End: 2024-03-21 | Stop reason: HOSPADM

## 2024-03-20 RX ORDER — HYOSCYAMINE SULFATE 0.125 MG
125 TABLET ORAL EVERY 4 HOURS PRN
Status: DISCONTINUED | OUTPATIENT
Start: 2024-03-20 | End: 2024-03-21 | Stop reason: HOSPADM

## 2024-03-20 RX ORDER — SODIUM CHLORIDE 0.9 % (FLUSH) 0.9 %
5-40 SYRINGE (ML) INJECTION PRN
Status: DISCONTINUED | OUTPATIENT
Start: 2024-03-20 | End: 2024-03-21 | Stop reason: HOSPADM

## 2024-03-20 RX ORDER — ENOXAPARIN SODIUM 100 MG/ML
30 INJECTION SUBCUTANEOUS DAILY
Status: DISCONTINUED | OUTPATIENT
Start: 2024-03-20 | End: 2024-03-21 | Stop reason: HOSPADM

## 2024-03-20 RX ORDER — ACETAMINOPHEN 325 MG/1
650 TABLET ORAL EVERY 6 HOURS PRN
Status: DISCONTINUED | OUTPATIENT
Start: 2024-03-20 | End: 2024-03-21 | Stop reason: HOSPADM

## 2024-03-20 RX ORDER — ONDANSETRON 2 MG/ML
4 INJECTION INTRAMUSCULAR; INTRAVENOUS EVERY 6 HOURS PRN
Status: DISCONTINUED | OUTPATIENT
Start: 2024-03-20 | End: 2024-03-21 | Stop reason: HOSPADM

## 2024-03-20 RX ORDER — HYOSCYAMINE SULFATE 0.125 MG
125 TABLET,DISINTEGRATING ORAL EVERY 4 HOURS PRN
COMMUNITY

## 2024-03-20 RX ORDER — POTASSIUM CHLORIDE 7.45 MG/ML
10 INJECTION INTRAVENOUS PRN
Status: DISCONTINUED | OUTPATIENT
Start: 2024-03-20 | End: 2024-03-21 | Stop reason: HOSPADM

## 2024-03-20 RX ORDER — POTASSIUM CHLORIDE 20 MEQ/1
40 TABLET, EXTENDED RELEASE ORAL PRN
Status: DISCONTINUED | OUTPATIENT
Start: 2024-03-20 | End: 2024-03-21 | Stop reason: HOSPADM

## 2024-03-20 RX ORDER — SODIUM CHLORIDE 0.9 % (FLUSH) 0.9 %
5-40 SYRINGE (ML) INJECTION EVERY 12 HOURS SCHEDULED
Status: DISCONTINUED | OUTPATIENT
Start: 2024-03-20 | End: 2024-03-21 | Stop reason: HOSPADM

## 2024-03-20 RX ORDER — 0.9 % SODIUM CHLORIDE 0.9 %
1000 INTRAVENOUS SOLUTION INTRAVENOUS ONCE
Status: COMPLETED | OUTPATIENT
Start: 2024-03-20 | End: 2024-03-20

## 2024-03-20 RX ORDER — LORAZEPAM 0.5 MG/1
0.5 TABLET ORAL NIGHTLY PRN
COMMUNITY

## 2024-03-20 RX ORDER — ASPIRIN 81 MG/1
324 TABLET, CHEWABLE ORAL ONCE
Status: COMPLETED | OUTPATIENT
Start: 2024-03-20 | End: 2024-03-20

## 2024-03-20 RX ORDER — ONDANSETRON 4 MG/1
4 TABLET, ORALLY DISINTEGRATING ORAL EVERY 8 HOURS PRN
Status: DISCONTINUED | OUTPATIENT
Start: 2024-03-20 | End: 2024-03-21 | Stop reason: HOSPADM

## 2024-03-20 RX ORDER — NORTRIPTYLINE HYDROCHLORIDE 25 MG/1
25 CAPSULE ORAL DAILY
COMMUNITY

## 2024-03-20 RX ORDER — POLYETHYLENE GLYCOL 3350 17 G/17G
17 POWDER, FOR SOLUTION ORAL DAILY PRN
Status: DISCONTINUED | OUTPATIENT
Start: 2024-03-20 | End: 2024-03-21 | Stop reason: HOSPADM

## 2024-03-20 RX ORDER — SODIUM CHLORIDE 9 MG/ML
INJECTION, SOLUTION INTRAVENOUS CONTINUOUS
Status: DISCONTINUED | OUTPATIENT
Start: 2024-03-20 | End: 2024-03-21 | Stop reason: HOSPADM

## 2024-03-20 RX ADMIN — LORAZEPAM 0.5 MG: 0.5 TABLET ORAL at 21:20

## 2024-03-20 RX ADMIN — SODIUM CHLORIDE, PRESERVATIVE FREE 10 ML: 5 INJECTION INTRAVENOUS at 21:20

## 2024-03-20 RX ADMIN — ENOXAPARIN SODIUM 30 MG: 100 INJECTION SUBCUTANEOUS at 21:20

## 2024-03-20 RX ADMIN — SODIUM CHLORIDE: 9 INJECTION, SOLUTION INTRAVENOUS at 18:51

## 2024-03-20 RX ADMIN — SODIUM CHLORIDE 1000 ML: 9 INJECTION, SOLUTION INTRAVENOUS at 10:46

## 2024-03-20 RX ADMIN — ASPIRIN 324 MG: 81 TABLET, CHEWABLE ORAL at 15:52

## 2024-03-20 ASSESSMENT — PATIENT HEALTH QUESTIONNAIRE - PHQ9
SUM OF ALL RESPONSES TO PHQ QUESTIONS 1-9: 17
6. FEELING BAD ABOUT YOURSELF - OR THAT YOU ARE A FAILURE OR HAVE LET YOURSELF OR YOUR FAMILY DOWN: NOT AT ALL
3. TROUBLE FALLING OR STAYING ASLEEP: NEARLY EVERY DAY
7. TROUBLE CONCENTRATING ON THINGS, SUCH AS READING THE NEWSPAPER OR WATCHING TELEVISION: MORE THAN HALF THE DAYS
2. FEELING DOWN, DEPRESSED OR HOPELESS: MORE THAN HALF THE DAYS
8. MOVING OR SPEAKING SO SLOWLY THAT OTHER PEOPLE COULD HAVE NOTICED. OR THE OPPOSITE, BEING SO FIGETY OR RESTLESS THAT YOU HAVE BEEN MOVING AROUND A LOT MORE THAN USUAL: MORE THAN HALF THE DAYS
10. IF YOU CHECKED OFF ANY PROBLEMS, HOW DIFFICULT HAVE THESE PROBLEMS MADE IT FOR YOU TO DO YOUR WORK, TAKE CARE OF THINGS AT HOME, OR GET ALONG WITH OTHER PEOPLE: VERY DIFFICULT
SUM OF ALL RESPONSES TO PHQ QUESTIONS 1-9: 17
SUM OF ALL RESPONSES TO PHQ QUESTIONS 1-9: 17
5. POOR APPETITE OR OVEREATING: NEARLY EVERY DAY
9. THOUGHTS THAT YOU WOULD BE BETTER OFF DEAD, OR OF HURTING YOURSELF: NOT AT ALL
SUM OF ALL RESPONSES TO PHQ QUESTIONS 1-9: 17
1. LITTLE INTEREST OR PLEASURE IN DOING THINGS: MORE THAN HALF THE DAYS
4. FEELING TIRED OR HAVING LITTLE ENERGY: NEARLY EVERY DAY
SUM OF ALL RESPONSES TO PHQ9 QUESTIONS 1 & 2: 4

## 2024-03-20 ASSESSMENT — PAIN DESCRIPTION - DESCRIPTORS: DESCRIPTORS: PRESSURE

## 2024-03-20 ASSESSMENT — PAIN DESCRIPTION - FREQUENCY: FREQUENCY: CONTINUOUS

## 2024-03-20 ASSESSMENT — PAIN DESCRIPTION - LOCATION: LOCATION: CHEST;BREAST

## 2024-03-20 ASSESSMENT — PAIN - FUNCTIONAL ASSESSMENT: PAIN_FUNCTIONAL_ASSESSMENT: 0-10

## 2024-03-20 ASSESSMENT — PAIN DESCRIPTION - ONSET: ONSET: ON-GOING

## 2024-03-20 ASSESSMENT — PAIN DESCRIPTION - ORIENTATION: ORIENTATION: LEFT

## 2024-03-20 ASSESSMENT — PAIN DESCRIPTION - PAIN TYPE: TYPE: ACUTE PAIN

## 2024-03-20 ASSESSMENT — PAIN SCALES - GENERAL: PAINLEVEL_OUTOF10: 10

## 2024-03-20 NOTE — PROGRESS NOTES
Sent pt by ambulance due to chest pain and SOB. Is being transferred downtown. Left the office via ambulance [id number].

## 2024-03-20 NOTE — ED NOTES
Report given to MARI Hawthorne from 8200.   Report method by phone   The following was reviewed with receiving RN:   Current vital signs:  /65   Pulse 77   Temp 98 °F (36.7 °C)   Resp 18   Wt 46.7 kg (103 lb)   SpO2 100%   BMI 18.84 kg/m²                MEWS Score: 2     Any medication or safety alerts were reviewed. Any pending diagnostics and notifications were also reviewed, as well as any safety concerns or issues, abnormal labs, abnormal imaging, and abnormal assessment findings. Questions were answered.

## 2024-03-20 NOTE — ED PROVIDER NOTES
[facility] EMERGENCY DEPARTMENT  EMERGENCY DEPARTMENT ENCOUNTER        Pt Name: Elisa Toth  MRN: [id number]  Birthdate 1955  Date of evaluation: 3/20/2024  Provider: Maricarmen Nuñez DO  PCP: Carrie Neal DO  Note Started: 10:05 AM EDT 3/20/24    CHIEF COMPLAINT       Chief Complaint   Patient presents with    Chest Pain     Chest pressure since Sunday, saw PCP today did EKG made her come here, pressure 10/10 under left breast, denies cardiac hx, has some SOB, hx anxiety       HISTORY OF PRESENT ILLNESS: 1 or more Elements   History From: Patient    Limitations to history : None    Elisa Toth is a 69 y.o. female who presents with concern for chest pressure.  It is left-sided, does not radiate, has been ongoing and persistent since Sunday without any radiation.  She says it has been completely constant, no injuries or trauma, no travels or surgeries, she does not have a cardiac history.  Does not smoke, no history of asthma or COPD.  She does not have radiating pain to her neck or arm.  She does get short of breath with exertion.  She has a history of anxiety, she saw her PCP this morning and she recommended that she come into the emergency room.  No other associated complaints.    REVIEW OF SYSTEMS :      Positives and Pertinent negatives as per HPI.     SURGICAL HISTORY     Past Surgical History:   Procedure Laterality Date    CATARACT REMOVAL  07/17/11    also had scar tissue removed at this time from prev retinal surgeries    LEEP      TONSILLECTOMY AND ADENOIDECTOMY         CURRENTMEDICATIONS       Previous Medications    HYOSCYAMINE (LEVSIN/SL) 125 MCG SUBLINGUAL TABLET    place 1 tablet under the tongue every 4 hours    LORAZEPAM (ATIVAN) 0.5 MG TABLET    take 1 tablet by mouth every 8 hours if needed for anxiety    METOCLOPRAMIDE (REGLAN) 10 MG TABLET    Take 1 tablet by mouth every 6 hours as needed (nausea)    NORTRIPTYLINE (PAMELOR) 25 MG CAPSULE

## 2024-03-20 NOTE — PROGRESS NOTES
Medicare Annual Wellness Visit    Elisa Toth is here for Other (For the past 2 days Pt has had Sob and tightness in her chest. States it could be anxiety, believes she is having a reaction to her new medication Ativan. ) and Medicare AW    Assessment & Plan   Angina at rest  -     EKG 12 Lead - Clinic Performed  Medicare annual wellness visit, subsequent    Recommendations for Preventive Services Due: see orders and patient instructions/AVS.  Recommended screening schedule for the next 5-10 years is provided to the patient in written form: see Patient Instructions/AVS.     Return for Medicare Annual Wellness Visit in 1 year.     Subjective   The following acute and/or chronic problems were also addressed today:  Chest pain:  She is here with complaints of chest pain and is  having active chest pain.  Patient does have palpitations, shortness of breath, no dizziness, syncope, or swelling.  The pain does not radiate. The pain does get worse with exertion and does get better with rest but is still present.   She does not have history of recent increased activity/exercise/lifting. Patient does not have a personal history of heart disease. Patient does have a family history of heart disease.  She has not had a cardiac workup in the past including stress test and/or cardiac catheterization.        She feels that she is having shortness of breath and chest tightness even with her lorazepam.  Her pain is worse behind her left breast and into her stomach. she is having a hard time sleeping.  She has lost weight.  She complains of dyspnea on exertion.  She relates that her maternal grandmother had heart disease.    She denies anxiety. She recently had an EGD by [facility].  She relates that it was normal.  She states that she was told she is constipated.    Patient's complete Health Risk Assessment and screening values have been reviewed and are found in Flowsheets. The following problems were reviewed

## 2024-03-21 ENCOUNTER — APPOINTMENT (OUTPATIENT)
Dept: NUCLEAR MEDICINE | Age: 69
End: 2024-03-21
Payer: MEDICARE

## 2024-03-21 VITALS
SYSTOLIC BLOOD PRESSURE: 125 MMHG | DIASTOLIC BLOOD PRESSURE: 74 MMHG | BODY MASS INDEX: 18.95 KG/M2 | TEMPERATURE: 96.1 F | HEIGHT: 62 IN | RESPIRATION RATE: 18 BRPM | WEIGHT: 103 LBS | OXYGEN SATURATION: 100 % | HEART RATE: 62 BPM

## 2024-03-21 LAB
ANION GAP SERPL CALCULATED.3IONS-SCNC: 14 MMOL/L (ref 7–16)
BUN SERPL-MCNC: 18 MG/DL (ref 6–23)
CALCIUM SERPL-MCNC: 8.6 MG/DL (ref 8.6–10.2)
CHLORIDE SERPL-SCNC: 110 MMOL/L (ref 98–107)
CO2 SERPL-SCNC: 18 MMOL/L (ref 22–29)
CREAT SERPL-MCNC: 0.8 MG/DL (ref 0.5–1)
GFR SERPL CREATININE-BSD FRML MDRD: >60 ML/MIN/1.73M2
GLUCOSE SERPL-MCNC: 69 MG/DL (ref 74–99)
POTASSIUM SERPL-SCNC: 4.4 MMOL/L (ref 3.5–5)
SODIUM SERPL-SCNC: 142 MMOL/L (ref 132–146)

## 2024-03-21 PROCEDURE — G0378 HOSPITAL OBSERVATION PER HR: HCPCS

## 2024-03-21 PROCEDURE — 80048 BASIC METABOLIC PNL TOTAL CA: CPT

## 2024-03-21 PROCEDURE — 2580000003 HC RX 258: Performed by: INTERNAL MEDICINE

## 2024-03-21 PROCEDURE — 6360000002 HC RX W HCPCS: Performed by: INTERNAL MEDICINE

## 2024-03-21 PROCEDURE — 6370000000 HC RX 637 (ALT 250 FOR IP): Performed by: INTERNAL MEDICINE

## 2024-03-21 PROCEDURE — 99222 1ST HOSP IP/OBS MODERATE 55: CPT | Performed by: INTERNAL MEDICINE

## 2024-03-21 PROCEDURE — APPSS60 APP SPLIT SHARED TIME 46-60 MINUTES: Performed by: NURSE PRACTITIONER

## 2024-03-21 PROCEDURE — 36415 COLL VENOUS BLD VENIPUNCTURE: CPT

## 2024-03-21 PROCEDURE — 96372 THER/PROPH/DIAG INJ SC/IM: CPT

## 2024-03-21 RX ADMIN — SODIUM CHLORIDE: 9 INJECTION, SOLUTION INTRAVENOUS at 08:20

## 2024-03-21 RX ADMIN — NORTRIPTYLINE HYDROCHLORIDE 25 MG: 25 CAPSULE ORAL at 08:33

## 2024-03-21 RX ADMIN — ENOXAPARIN SODIUM 30 MG: 100 INJECTION SUBCUTANEOUS at 08:32

## 2024-03-21 RX ADMIN — SODIUM CHLORIDE, PRESERVATIVE FREE 10 ML: 5 INJECTION INTRAVENOUS at 08:32

## 2024-03-21 NOTE — ACP (ADVANCE CARE PLANNING)
Advance Care Planning   Healthcare Decision Maker:    Primary Decision Maker (Active): Haider Toth - [facility] - [phone]    Click here to complete Healthcare Decision Makers including selection of the Healthcare Decision Maker Relationship (ie \"Primary\").       Electronically signed by Toño Blunt RN CM  on 3/21/2024 at 4:05 PM

## 2024-03-21 NOTE — CARE COORDINATION
03/21/24 Transition of care:  Pt admitted OBS for chest pain.  Pt refused stress test due to not feeling well Troponin is 10 Met with pt to discuss transition of care and discharge preferences pt decline including family in discharge planning Pt lives with  in one story home with 2 small steps to enter no handrail pt is independent with ADLs and driving Pt uses no AD for ambulation No HHC or BLAYNE history PCP is Ashvin Padilla RX is [facility] [place] Plan is to return home with no needs expressed for discharge  will transport home CM/SW to follow Electronically signed by Toño Blunt RN SHIRA on 3/21/2024 at 4:14 PM     Case Management Assessment  Initial Evaluation    Date/Time of Evaluation: 3/21/2024 4:15 PM  Assessment Completed by: Toño Blunt RN    If patient is discharged prior to next notation, then this note serves as note for discharge by case management.    Patient Name: Elisa Toth                   YOB: 1955  Diagnosis: Acute chest pain [R07.9]                   Date / Time: 3/20/2024  9:51 AM    Patient Admission Status: Observation   Readmission Risk (Low < 19, Mod (19-27), High > 27): Readmission Risk Score: 21.9    Current PCP: Carrie Neal, DO  PCP verified by CM? Yes    Chart Reviewed: Yes      History Provided by: Patient  Patient Orientation: Alert and Oriented    Patient Cognition: Alert    Hospitalization in the last 30 days (Readmission):  No    If yes, Readmission Assessment in CM Navigator will be completed.    Advance Directives:      Code Status: Full Code   Patient's Primary Decision Maker is: Legal Next of Kin    Primary Decision Maker (Active): Haider Toth - Spouse - [phone]    Discharge Planning:    Patient lives with: Spouse/Significant Other Type of Home: House  Primary Care Giver: Self  Patient Support Systems include: Spouse/Significant Other   Current Financial resources:    Current community resources:    Current services prior

## 2024-03-21 NOTE — CONSULTS
Inpatient Cardiology Consultation      Reason for Consult:  Chest Pain    Consulting Physician: Dr Garcia    Requesting Physician:  Dr Elam    Date of Consultation: 3/21/2024    HISTORY OF PRESENT ILLNESS: 68 yo thin  female not previously known to [facility].    PMH: Anxiety, depression, IBS-C, and lifelong non-smoker.       3/20/2024 Seen by PCP for chest tightness and SOB thought to be related to her new medication Ativan (PRESCRIBED 1 WEEK).     Chest tightness started Sunday while doing something @ home tightness under L breast non-radiating rated a 10 out 10. The chest tightness has been constant since Sunday. Feels like she sis having some indigestion currently and L sided under her breast chest tightness is currently a 5 out 10. Resting makes chest tightness ease up but it has never resolved.     [facility] 3/20/2024 /96 HR  SR-ST, afebrile, and O2 saturation 100% on RA      Troponin 10>10, p-BNP <36, Bun/Cr 27/0.9>18/0.8, K+ 4.3>4.4, Na 141, LFT's WNL, CBC WNL, D-dimer <200, respiratory panel including COVID-19 NEGATIVE    1 liter NSS,  mg in ED    Pamelor re-ordered from home      Currently /63 HR 59-62 SB-SR, remains afebrile, and O2 saturation 98% on RA        Please note: past medical records were reviewed per electronic medical record (EMR) - see detailed reports under Past Medical/ Surgical History.   Past Medical History:    Anxiety on Ativan  Depression Pamelor  IBS-constipation  2/2024 EGD (nausea, weight loss, abdominal pain): atrophic gastric mucosa with mild hyperemia throughout. Non-obstructing Schatzki's ring. Negative for H Pylori  2/2024 Abdominal CT> showing large stool burden per GI> ordered Linzess per patient he did not work  Denies having COVID-19 infection  COVID-19 x 2 Moderena  Denies HTN, HLD, DM, thyroid isssues, DVT/PE, carcinoma, PUD, blood transfusions      Past Surgical History:  Repair of detached R retina, cataract surgery, LEEP,

## 2024-03-21 NOTE — H&P
[facility]              [address] [place] [address]                           HISTORY & PHYSICAL      PATIENT NAME: SADI HAIRSTON                : 1955  MED REC NO: [id number]                        ROOM: [id number]  ACCOUNT NO: [id number]                       ADMIT DATE: 2024  PROVIDER: Con Elam DO      PRIMARY CARE PHYSICIAN:  Carrie Padilla DO    CHIEF COMPLAINT:  Chest pain.    HISTORY OF PRESENT ILLNESS:  The patient is a 69-year-old  female, was sent in by her primary care physician.  She was seen in the office.  She had chest pain for 3 days, not related to exertion, described as a tightness.  She was sent to the emergency room and admitted for further evaluation and treatment.    MEDICATIONS:  Prior to admission:  Hyoscyamine, Ativan p.r.n., and nortriptyline.    SOCIAL HISTORY:  No tobacco.  No alcohol.    PAST MEDICAL HISTORY:  Detached retina, depression.    PAST SURGICAL HISTORY:  Right eye detached retina surgery, tonsillectomy, wisdom teeth extraction, right eye cataract surgery, and left wrist ganglion surgery.    REVIEW OF SYSTEMS:  Remarkable for above-stated chief complaint.    ALLERGIES:  TO CODEINE, XANAX, AND DILAUDID.      PHYSICAL EXAMINATION:  GENERAL APPEARANCE:  Reveals a 69-year-old  female, who is alert, cooperative, and a good historian.  VITAL SIGNS:  On admission:  Temperature 98 degrees, pulse 103, respirations 18, and blood pressure 123/96, repeat 117/69.  HEENT:  Head; normocephalic and atraumatic.  Eyes; left eye pupil round and reactive to light.  Right eye minimally reactive to light.  Nose; no obstruction, polyp, or discharge noted.  Mouth; mucosa without lesion.  Pharynx; noninjected without exudate.  NECK:  Supple.  No JVD.  No thyromegaly.  No carotid bruits.  HEART:  Regular rate and rhythm without murmur.  LUNGS:  Clear to auscultation bilaterally.  ABDOMEN:  Positive bowel

## 2024-03-21 NOTE — PLAN OF CARE
Problem: Pain  Goal: Verbalizes/displays adequate comfort level or baseline comfort level  Outcome: Not Progressing     Problem: Pain  Goal: Verbalizes/displays adequate comfort level or baseline comfort level  Outcome: Not Progressing     Problem: Safety - Adult  Goal: Free from fall injury  Outcome: Not Progressing

## 2024-03-21 NOTE — PROGRESS NOTES
Patient refused stress test  May be discharged from cardiac standpoint  Cardiology will sign off    Electronically signed by Delfina Garcia MD on 3/21/2024 at 4:00 PM

## 2024-03-21 NOTE — PROGRESS NOTES
Instructed on all discharge instructions including meds for purpose,side effects dosage. Instructed on follow up appts with md. All belongings gathered to be sent home with pt. Instructed pt if she has chest pain she needs to go to er and follow all doctor orders.pt discharged to home with

## 2024-03-21 NOTE — PROGRESS NOTES
Patient arrived in stress department and educated on stress test with all questions answered. She states she does not feel well enough to proceed with test. Dr. Garcia's NP Siomara notified of above and floor RN.   Sena Farley RN

## 2024-03-22 ENCOUNTER — CARE COORDINATION (OUTPATIENT)
Dept: CARE COORDINATION | Age: 69
End: 2024-03-22

## 2024-03-22 NOTE — CARE COORDINATION
Care Transitions Initial Follow Up Call    Call within 2 business days of discharge: Yes    Patient: Elisa Toth Patient : 1955   MRN: [id number]  Reason for Admission: Acute Chest Pain  Discharge Date: 3/21/24 RARS: Readmission Risk Score: 21.9      Last Discharge Facility       Date Complaint Diagnosis Description Type Department Provider    3/20/24 Chest Pain Acute chest pain ED to Hosp-Admission (Discharged) (ADMITTED) [facility] Con Elam, DO; Billie,...          Was this an external facility discharge? No Discharge Facility: [facility]    Attempted to contact patient today 3/22/24 for initial KOURTNEY/hospital discharge follow up. Left message on home/mobile number requesting a return call back to CTN and provided contact information.    Nora Hess, APRN

## 2024-03-25 ENCOUNTER — CARE COORDINATION (OUTPATIENT)
Dept: CARE COORDINATION | Age: 69
End: 2024-03-25

## 2024-03-25 ENCOUNTER — TELEPHONE (OUTPATIENT)
Dept: PHARMACY | Facility: CLINIC | Age: 69
End: 2024-03-25

## 2024-03-25 NOTE — CARE COORDINATION
Care Transitions Initial Follow Up Call    Call within 2 business days of discharge: Yes    Patient Current Location:  Home: [address] [place] [address] [place] [address]    Care Transition Nurse contacted the spouse/partner by telephone to perform post hospital discharge assessment. Verified name and  with spouse/partner as identifiers. Provided introduction to self, and explanation of the Care Transition Nurse role.     Patient: Elisa Toth Patient : 1955   MRN: [id number]  Reason for Admission: Acute chest pain  Discharge Date: 3/21/24 RARS: Readmission Risk Score: 21.9      Last Discharge Facility       Date Complaint Diagnosis Description Type Department Provider    3/20/24 Chest Pain Acute chest pain ED to Hosp-Admission (Discharged) (ADMITTED) [facility] Con Elam, DO; Billie,...          Was this an external facility discharge? No Discharge Facility: [facility]    Challenges to be reviewed by the provider   Additional needs identified to be addressed with provider: No  none               Method of communication with provider: none.    Spoke with patient  (Haider) on communication release regarding initial KOURTNEY/hospital discharge follow up. Haider states patient is currently sleeping and agrees to answer questions for discharge f/u.     Haider states patient continues with \"stomach pain\" under left breast and scheduled to follow up with her GI doctor on 24. Haider unable to recall name of GI doctor patient will be seeing.     Noted abdominal pain has been ongoing and and had EGD with biopsies in February per Dr. Ramos (GI) locally. Which revealed non-obstructing Schatski's ring and significant stool burden. Patient was give samples of Linzess and to f/u re: biopsy results.     Unable to provide a complete review of home meds. Haider confirmed no new meds were ordered on hospital discharge. [facility]     Haider denies any other complaints or concerns at

## 2024-03-25 NOTE — TELEPHONE ENCOUNTER
----- Message from SCOTTY Rodriguez sent at 3/25/2024 10:19 AM EDT -----  Regarding: referral for med rec  Hello,     Patient discharged from [facility] 3/21/24 for acute chest pain. Unable to perform med rec with patient spouse during 24 Hr f/u call. Please reach out to complete med rec.    Thanks,     Nora

## 2024-03-25 NOTE — TELEPHONE ENCOUNTER
Received a referral:  from Care Coordinator to review patient’s medications. Called patient to schedule a time to speak with a pharmacist over the telephone.     No answer left VM: Please contact us at  [phone] to schedule this appointment. Pharmacists are available Monday thru Friday 7:30 AM till 5:30 PM.      Giana Saucedo CPhT.   Population Health Clinical   [facility]  Toll free: [phone]

## 2024-04-02 NOTE — TELEPHONE ENCOUNTER
Reached patient however she declined telephone appointment.  States she's only on 2 medications and has no questions.    Giana Saucedo [facility].   Population Health Clinical   [facility]  Toll free: [phone] Option 2     For Pharmacy Admin Tracking Only    Program: [employer]  CPA in place:  No  Gap Closed?: No   Time Spent (min): 15

## 2024-04-05 ENCOUNTER — CARE COORDINATION (OUTPATIENT)
Dept: CARE COORDINATION | Age: 69
End: 2024-04-05

## 2024-04-05 NOTE — CARE COORDINATION
Care Transitions Follow Up Call    Patient: Elisa Toth  Patient : 1955   MRN: [id number]  Reason for Admission: Acute Chest Pain  Discharge Date: 3/21/24 RARS: Readmission Risk Score: 21.9    Attempted to contact patient today 24 for KOURTNEY/hospital discharge follow up sub call. Left message on home/mobile number requesting a return call back to CTN and provided contact information.     Nora Hess, APRN

## 2024-04-09 ENCOUNTER — APPOINTMENT (OUTPATIENT)
Dept: ULTRASOUND IMAGING | Age: 69
DRG: 391 | End: 2024-04-09
Attending: FAMILY MEDICINE
Payer: MEDICARE

## 2024-04-09 ENCOUNTER — HOSPITAL ENCOUNTER (INPATIENT)
Age: 69
LOS: 5 days | Discharge: HOME | DRG: 391 | End: 2024-04-14
Attending: FAMILY MEDICINE | Admitting: FAMILY MEDICINE
Payer: MEDICARE

## 2024-04-09 ENCOUNTER — HOSPITAL ENCOUNTER (EMERGENCY)
Age: 69
Discharge: ANOTHER HOSPITAL | End: 2024-04-09
Attending: STUDENT IN AN ORGANIZED HEALTH CARE EDUCATION/TRAINING PROGRAM
Payer: MEDICARE

## 2024-04-09 ENCOUNTER — APPOINTMENT (OUTPATIENT)
Dept: GENERAL RADIOLOGY | Age: 69
End: 2024-04-09
Payer: MEDICARE

## 2024-04-09 VITALS
SYSTOLIC BLOOD PRESSURE: 106 MMHG | RESPIRATION RATE: 14 BRPM | DIASTOLIC BLOOD PRESSURE: 62 MMHG | HEART RATE: 86 BPM | TEMPERATURE: 98 F | OXYGEN SATURATION: 99 %

## 2024-04-09 DIAGNOSIS — R06.00 DYSPNEA AND RESPIRATORY ABNORMALITIES: Primary | ICD-10-CM

## 2024-04-09 DIAGNOSIS — R06.89 DYSPNEA AND RESPIRATORY ABNORMALITIES: Primary | ICD-10-CM

## 2024-04-09 DIAGNOSIS — F41.9 ANXIETY: ICD-10-CM

## 2024-04-09 DIAGNOSIS — R07.9 CHEST PAIN, UNSPECIFIED TYPE: ICD-10-CM

## 2024-04-09 DIAGNOSIS — R07.9 CHEST PAIN, UNSPECIFIED TYPE: Primary | ICD-10-CM

## 2024-04-09 LAB
ANION GAP SERPL CALCULATED.3IONS-SCNC: 16 MMOL/L (ref 7–16)
BASOPHILS # BLD: 0.04 K/UL (ref 0–0.2)
BASOPHILS NFR BLD: 1 % (ref 0–2)
BUN SERPL-MCNC: 19 MG/DL (ref 6–23)
CALCIUM SERPL-MCNC: 9.7 MG/DL (ref 8.6–10.2)
CHLORIDE SERPL-SCNC: 103 MMOL/L (ref 98–107)
CO2 SERPL-SCNC: 22 MMOL/L (ref 22–29)
CREAT SERPL-MCNC: 0.8 MG/DL (ref 0.5–1)
D DIMER: <200 NG/ML DDU (ref 0–232)
EKG ATRIAL RATE: 108 BPM
EKG P AXIS: 83 DEGREES
EKG P-R INTERVAL: 128 MS
EKG Q-T INTERVAL: 324 MS
EKG QRS DURATION: 68 MS
EKG QTC CALCULATION (BAZETT): 434 MS
EKG R AXIS: 90 DEGREES
EKG T AXIS: 61 DEGREES
EKG VENTRICULAR RATE: 108 BPM
EOSINOPHIL # BLD: 0.07 K/UL (ref 0.05–0.5)
EOSINOPHILS RELATIVE PERCENT: 1 % (ref 0–6)
ERYTHROCYTE [DISTWIDTH] IN BLOOD BY AUTOMATED COUNT: 13.6 % (ref 11.5–15)
GFR SERPL CREATININE-BSD FRML MDRD: 80 ML/MIN/1.73M2
GLUCOSE SERPL-MCNC: 137 MG/DL (ref 74–99)
HCT VFR BLD AUTO: 41.8 % (ref 34–48)
HGB BLD-MCNC: 14.3 G/DL (ref 11.5–15.5)
IMM GRANULOCYTES # BLD AUTO: <0.03 K/UL (ref 0–0.58)
IMM GRANULOCYTES NFR BLD: 0 % (ref 0–5)
LIPASE SERPL-CCNC: 19 U/L (ref 13–60)
LYMPHOCYTES NFR BLD: 1.2 K/UL (ref 1.5–4)
LYMPHOCYTES RELATIVE PERCENT: 21 % (ref 20–42)
MAGNESIUM SERPL-MCNC: 1.9 MG/DL (ref 1.6–2.6)
MCH RBC QN AUTO: 30.8 PG (ref 26–35)
MCHC RBC AUTO-ENTMCNC: 34.2 G/DL (ref 32–34.5)
MCV RBC AUTO: 89.9 FL (ref 80–99.9)
MONOCYTES NFR BLD: 0.48 K/UL (ref 0.1–0.95)
MONOCYTES NFR BLD: 8 % (ref 2–12)
NEUTROPHILS NFR BLD: 68 % (ref 43–80)
NEUTS SEG NFR BLD: 3.91 K/UL (ref 1.8–7.3)
PLATELET # BLD AUTO: 283 K/UL (ref 130–450)
PMV BLD AUTO: 10.2 FL (ref 7–12)
POTASSIUM SERPL-SCNC: 3.6 MMOL/L (ref 3.5–5)
RBC # BLD AUTO: 4.65 M/UL (ref 3.5–5.5)
SODIUM SERPL-SCNC: 141 MMOL/L (ref 132–146)
TROPONIN I SERPL HS-MCNC: 6 NG/L (ref 0–9)
TROPONIN I SERPL HS-MCNC: 7 NG/L (ref 0–9)
WBC OTHER # BLD: 5.7 K/UL (ref 4.5–11.5)

## 2024-04-09 PROCEDURE — 83690 ASSAY OF LIPASE: CPT

## 2024-04-09 PROCEDURE — 2580000003 HC RX 258

## 2024-04-09 PROCEDURE — 83735 ASSAY OF MAGNESIUM: CPT

## 2024-04-09 PROCEDURE — 85025 COMPLETE CBC W/AUTO DIFF WBC: CPT

## 2024-04-09 PROCEDURE — 71045 X-RAY EXAM CHEST 1 VIEW: CPT

## 2024-04-09 PROCEDURE — 1200000000 HC SEMI PRIVATE

## 2024-04-09 PROCEDURE — 93005 ELECTROCARDIOGRAM TRACING: CPT | Performed by: STUDENT IN AN ORGANIZED HEALTH CARE EDUCATION/TRAINING PROGRAM

## 2024-04-09 PROCEDURE — 84484 ASSAY OF TROPONIN QUANT: CPT

## 2024-04-09 PROCEDURE — 6360000002 HC RX W HCPCS

## 2024-04-09 PROCEDURE — 96374 THER/PROPH/DIAG INJ IV PUSH: CPT

## 2024-04-09 PROCEDURE — 6370000000 HC RX 637 (ALT 250 FOR IP): Performed by: STUDENT IN AN ORGANIZED HEALTH CARE EDUCATION/TRAINING PROGRAM

## 2024-04-09 PROCEDURE — 80048 BASIC METABOLIC PNL TOTAL CA: CPT

## 2024-04-09 PROCEDURE — 85379 FIBRIN DEGRADATION QUANT: CPT

## 2024-04-09 PROCEDURE — 6360000002 HC RX W HCPCS: Performed by: STUDENT IN AN ORGANIZED HEALTH CARE EDUCATION/TRAINING PROGRAM

## 2024-04-09 PROCEDURE — 99285 EMERGENCY DEPT VISIT HI MDM: CPT

## 2024-04-09 PROCEDURE — 6370000000 HC RX 637 (ALT 250 FOR IP)

## 2024-04-09 PROCEDURE — 76705 ECHO EXAM OF ABDOMEN: CPT

## 2024-04-09 PROCEDURE — 93010 ELECTROCARDIOGRAM REPORT: CPT | Performed by: INTERNAL MEDICINE

## 2024-04-09 RX ORDER — IPRATROPIUM BROMIDE AND ALBUTEROL SULFATE 2.5; .5 MG/3ML; MG/3ML
1 SOLUTION RESPIRATORY (INHALATION) ONCE
Status: COMPLETED | OUTPATIENT
Start: 2024-04-09 | End: 2024-04-09

## 2024-04-09 RX ORDER — ACETAMINOPHEN 650 MG/1
650 SUPPOSITORY RECTAL EVERY 6 HOURS PRN
Status: DISCONTINUED | OUTPATIENT
Start: 2024-04-09 | End: 2024-04-14 | Stop reason: HOSPADM

## 2024-04-09 RX ORDER — SODIUM CHLORIDE 0.9 % (FLUSH) 0.9 %
5-40 SYRINGE (ML) INJECTION EVERY 12 HOURS SCHEDULED
Status: DISCONTINUED | OUTPATIENT
Start: 2024-04-09 | End: 2024-04-14 | Stop reason: HOSPADM

## 2024-04-09 RX ORDER — ONDANSETRON 2 MG/ML
4 INJECTION INTRAMUSCULAR; INTRAVENOUS EVERY 6 HOURS PRN
Status: DISCONTINUED | OUTPATIENT
Start: 2024-04-09 | End: 2024-04-14 | Stop reason: HOSPADM

## 2024-04-09 RX ORDER — POLYETHYLENE GLYCOL 3350 17 G/17G
17 POWDER, FOR SOLUTION ORAL DAILY PRN
Status: DISCONTINUED | OUTPATIENT
Start: 2024-04-09 | End: 2024-04-12

## 2024-04-09 RX ORDER — ONDANSETRON 4 MG/1
4 TABLET, ORALLY DISINTEGRATING ORAL EVERY 8 HOURS PRN
Status: DISCONTINUED | OUTPATIENT
Start: 2024-04-09 | End: 2024-04-14 | Stop reason: HOSPADM

## 2024-04-09 RX ORDER — SODIUM CHLORIDE 0.9 % (FLUSH) 0.9 %
5-40 SYRINGE (ML) INJECTION PRN
Status: DISCONTINUED | OUTPATIENT
Start: 2024-04-09 | End: 2024-04-14 | Stop reason: HOSPADM

## 2024-04-09 RX ORDER — PANTOPRAZOLE SODIUM 40 MG/1
40 TABLET, DELAYED RELEASE ORAL
Status: DISCONTINUED | OUTPATIENT
Start: 2024-04-10 | End: 2024-04-11

## 2024-04-09 RX ORDER — ACETAMINOPHEN 325 MG/1
650 TABLET ORAL EVERY 6 HOURS PRN
Status: DISCONTINUED | OUTPATIENT
Start: 2024-04-09 | End: 2024-04-14 | Stop reason: HOSPADM

## 2024-04-09 RX ORDER — DIPHENHYDRAMINE HYDROCHLORIDE 50 MG/ML
25 INJECTION INTRAMUSCULAR; INTRAVENOUS ONCE
Status: COMPLETED | OUTPATIENT
Start: 2024-04-09 | End: 2024-04-09

## 2024-04-09 RX ORDER — ENOXAPARIN SODIUM 100 MG/ML
30 INJECTION SUBCUTANEOUS DAILY
Status: DISCONTINUED | OUTPATIENT
Start: 2024-04-09 | End: 2024-04-14 | Stop reason: HOSPADM

## 2024-04-09 RX ORDER — NORTRIPTYLINE HYDROCHLORIDE 25 MG/1
25 CAPSULE ORAL DAILY
Status: DISCONTINUED | OUTPATIENT
Start: 2024-04-09 | End: 2024-04-14 | Stop reason: HOSPADM

## 2024-04-09 RX ORDER — LORAZEPAM 0.5 MG/1
0.5 TABLET ORAL NIGHTLY PRN
Status: DISCONTINUED | OUTPATIENT
Start: 2024-04-09 | End: 2024-04-10

## 2024-04-09 RX ORDER — HYDROXYZINE PAMOATE 25 MG/1
25 CAPSULE ORAL 3 TIMES DAILY PRN
Status: DISCONTINUED | OUTPATIENT
Start: 2024-04-09 | End: 2024-04-14 | Stop reason: HOSPADM

## 2024-04-09 RX ORDER — SODIUM CHLORIDE 9 MG/ML
INJECTION, SOLUTION INTRAVENOUS PRN
Status: DISCONTINUED | OUTPATIENT
Start: 2024-04-09 | End: 2024-04-14 | Stop reason: HOSPADM

## 2024-04-09 RX ADMIN — DIPHENHYDRAMINE HYDROCHLORIDE 25 MG: 50 INJECTION INTRAMUSCULAR; INTRAVENOUS at 06:41

## 2024-04-09 RX ADMIN — LORAZEPAM 0.5 MG: 0.5 TABLET ORAL at 20:26

## 2024-04-09 RX ADMIN — SODIUM CHLORIDE, PRESERVATIVE FREE 10 ML: 5 INJECTION INTRAVENOUS at 20:26

## 2024-04-09 RX ADMIN — NORTRIPTYLINE HYDROCHLORIDE 25 MG: 25 CAPSULE ORAL at 20:26

## 2024-04-09 RX ADMIN — IPRATROPIUM BROMIDE AND ALBUTEROL SULFATE 1 DOSE: 2.5; .5 SOLUTION RESPIRATORY (INHALATION) at 07:45

## 2024-04-09 RX ADMIN — ENOXAPARIN SODIUM 30 MG: 100 INJECTION SUBCUTANEOUS at 17:08

## 2024-04-09 ASSESSMENT — PAIN - FUNCTIONAL ASSESSMENT
PAIN_FUNCTIONAL_ASSESSMENT: NONE - DENIES PAIN

## 2024-04-09 ASSESSMENT — PAIN SCALES - GENERAL
PAINLEVEL_OUTOF10: 5
PAINLEVEL_OUTOF10: 3

## 2024-04-09 ASSESSMENT — PAIN DESCRIPTION - LOCATION
LOCATION: CHEST
LOCATION: CHEST

## 2024-04-09 ASSESSMENT — ENCOUNTER SYMPTOMS
NAUSEA: 1
TROUBLE SWALLOWING: 0
SHORTNESS OF BREATH: 1

## 2024-04-09 ASSESSMENT — PAIN DESCRIPTION - DESCRIPTORS
DESCRIPTORS: SQUEEZING
DESCRIPTORS: PRESSURE

## 2024-04-09 ASSESSMENT — PAIN DESCRIPTION - ORIENTATION: ORIENTATION: MID

## 2024-04-09 NOTE — H&P
[facility]  Resident History and Physical      CHIEF COMPLAINT:  Chest Pain, SOB       History of Present Illness:   Elisa Toth  is a 69 y.o. female patient of Carrie Neal DO  with a pertinent PMHx of anxiety, depression, GERD, and surgically fixed detached retina who presented to the [facility] from home with her  with chief complaint of chest pain and shortness of breath.    Chest pain began last night, she felt like \"she couldn't catch her breath.\" Chest pain constant since. Patient localizes to beneath left rib cage which sometimes \"pinches\" in her back. Patient is convinced pain is cardiac in origin. Patient recently admitted for chest pain which she reports felt similar to this episode, which then resolved, and has now started again. Patient reports during that hospitalization, she got short of breath and \"was afraid to do the stress test.\" Reports mother with history of bradycardia, father with enlarged heart. Nausated. Not eating at home, \"God only knows\" the last time she ate normally. Only eats bits and pieces \"because it hurts her stomach,\" but doesn't take medicine. Stomach pains began which her gastroenterologist did scope abdominal pain has been ongoing, EGD 3/5/24 per Dr. Ramos, chronic gastritis, H.pylori negative, non-obstructive Schatzki's ring. Not on any peptic medications because \"those don't work.\" Weakness has been ongoing throughout this time, to the point where her  says he is helping her out at home making meals and doing other household tasks.     Of note patient admitted to [facility] 3/20-3/25 for chest pain, advised to complete stress test, patient refused at that time and left without a stress test. She states she is agreeable to a stress test this admission.     Denies hx heart problems. Denies tobacco use. Denies EtOH use. Denies drug use.     Discussed CODE STATUS with patient at this time patient opted to be full

## 2024-04-09 NOTE — ED PROVIDER NOTES
[facility] EMERGENCY DEPARTMENT  EMERGENCY DEPARTMENT ENCOUNTER        Pt Name: Elisa Toth  MRN: [id number]  Birthdate 1955  Date of evaluation: 4/9/2024  Provider: Mihaela Milian MD  PCP: Carrie Neal DO  Note Started: 6:29 AM EDT 4/9/24    HPI  69 y.o. female presenting for shortness of breath.  Patient states she has been feeling as though she cannot catch her breath for the past week.  She states she takes Ativan to help her sleep and attributes it to this.  She states has been on Ativan for 3 weeks.  She complains of a muscle soreness in her lower chest with breathing.  She complains of ongoing nausea.  She denies any other chest pain, leg swelling, or other complaints at this time.  She has previous history of \"blood clot in her liver\"      --------------------------------------------- PAST HISTORY ---------------------------------------------  Past Medical History:  has a past medical history of Abnormal Pap smear of cervix, Anxiety, Detached retina, right, and IBS (irritable bowel syndrome).    Past Surgical History:  has a past surgical history that includes Tonsillectomy and adenoidectomy; Cataract removal (07/17/11); and LEEP.    Social History:  reports that she has never smoked. She has never used smokeless tobacco. She reports that she does not drink alcohol and does not use drugs.    Family History: family history includes Cancer in her maternal uncle and maternal uncle; Diabetes in her maternal grandmother; High Blood Pressure in her maternal grandmother; High Cholesterol in her mother.     The patient’s home medications have been reviewed.    Allergies: Xanax xr [alprazolam er], Codeine, and Dilaudid [hydromorphone hcl]      Review of Systems   HENT:  Negative for trouble swallowing.    Respiratory:  Positive for shortness of breath.    Cardiovascular:  Negative for chest pain and leg swelling.   Gastrointestinal:  Positive for nausea.   Skin:  Negative

## 2024-04-09 NOTE — CARE COORDINATION
Transition of care: Pt admitted for chest pain. Readmit from 3/21-same reason-refused Stress Test however. Patient has had numerous admissions in the last year. Pt lives with  in one story home with 2 small steps to enter no handrail pt is independent with ADLs and driving Pt uses no AD for ambulation No HHC or BLAYNE history PCP is Ashvin Padilla RX is [facility] [place] Plan is to return home with no needs expressed for discharge  will transport home.     Yuko GOMESN, RN  [facility]

## 2024-04-10 ENCOUNTER — APPOINTMENT (OUTPATIENT)
Dept: NUCLEAR MEDICINE | Age: 69
DRG: 391 | End: 2024-04-10
Attending: FAMILY MEDICINE
Payer: MEDICARE

## 2024-04-10 ENCOUNTER — APPOINTMENT (OUTPATIENT)
Dept: CT IMAGING | Age: 69
DRG: 391 | End: 2024-04-10
Attending: FAMILY MEDICINE
Payer: MEDICARE

## 2024-04-10 ENCOUNTER — APPOINTMENT (OUTPATIENT)
Age: 69
DRG: 391 | End: 2024-04-10
Attending: FAMILY MEDICINE
Payer: MEDICARE

## 2024-04-10 PROBLEM — E43 SEVERE PROTEIN-CALORIE MALNUTRITION (HCC): Status: ACTIVE | Noted: 2024-04-10

## 2024-04-10 LAB
ALBUMIN SERPL-MCNC: 3.9 G/DL (ref 3.5–5.2)
ALP SERPL-CCNC: 60 U/L (ref 35–104)
ALT SERPL-CCNC: 10 U/L (ref 0–32)
ANION GAP SERPL CALCULATED.3IONS-SCNC: 10 MMOL/L (ref 7–16)
AST SERPL-CCNC: 14 U/L (ref 0–31)
BASOPHILS # BLD: 0.05 K/UL (ref 0–0.2)
BASOPHILS NFR BLD: 1 % (ref 0–2)
BILIRUB SERPL-MCNC: 0.6 MG/DL (ref 0–1.2)
BUN SERPL-MCNC: 19 MG/DL (ref 6–23)
CALCIUM SERPL-MCNC: 9.3 MG/DL (ref 8.6–10.2)
CHLORIDE SERPL-SCNC: 105 MMOL/L (ref 98–107)
CO2 SERPL-SCNC: 22 MMOL/L (ref 22–29)
CREAT SERPL-MCNC: 0.7 MG/DL (ref 0.5–1)
ECHO BSA: 1.35 M2
EOSINOPHIL # BLD: 0.12 K/UL (ref 0.05–0.5)
EOSINOPHILS RELATIVE PERCENT: 2 % (ref 0–6)
ERYTHROCYTE [DISTWIDTH] IN BLOOD BY AUTOMATED COUNT: 13.8 % (ref 11.5–15)
GFR SERPL CREATININE-BSD FRML MDRD: >90 ML/MIN/1.73M2
GLUCOSE SERPL-MCNC: 70 MG/DL (ref 74–99)
HCT VFR BLD AUTO: 38.6 % (ref 34–48)
HGB BLD-MCNC: 12.8 G/DL (ref 11.5–15.5)
IMM GRANULOCYTES # BLD AUTO: <0.03 K/UL (ref 0–0.58)
IMM GRANULOCYTES NFR BLD: 0 % (ref 0–5)
LYMPHOCYTES NFR BLD: 1.51 K/UL (ref 1.5–4)
LYMPHOCYTES RELATIVE PERCENT: 26 % (ref 20–42)
MAGNESIUM SERPL-MCNC: 2 MG/DL (ref 1.6–2.6)
MCH RBC QN AUTO: 30 PG (ref 26–35)
MCHC RBC AUTO-ENTMCNC: 33.2 G/DL (ref 32–34.5)
MCV RBC AUTO: 90.6 FL (ref 80–99.9)
MONOCYTES NFR BLD: 0.62 K/UL (ref 0.1–0.95)
MONOCYTES NFR BLD: 11 % (ref 2–12)
NEUTROPHILS NFR BLD: 60 % (ref 43–80)
NEUTS SEG NFR BLD: 3.52 K/UL (ref 1.8–7.3)
NUC STRESS EJECTION FRACTION: 72 %
PLATELET # BLD AUTO: 277 K/UL (ref 130–450)
PMV BLD AUTO: 10.6 FL (ref 7–12)
POTASSIUM SERPL-SCNC: 3.7 MMOL/L (ref 3.5–5)
PROT SERPL-MCNC: 6 G/DL (ref 6.4–8.3)
RBC # BLD AUTO: 4.26 M/UL (ref 3.5–5.5)
SODIUM SERPL-SCNC: 137 MMOL/L (ref 132–146)
STRESS BASELINE DIAS BP: 79 MMHG
STRESS BASELINE HR: 100 BPM
STRESS BASELINE SYS BP: 156 MMHG
STRESS ESTIMATED WORKLOAD: 1 METS
STRESS PEAK DIAS BP: 81 MMHG
STRESS PEAK SYS BP: 179 MMHG
STRESS PERCENT HR ACHIEVED: 94 %
STRESS POST PEAK HR: 144 BPM
STRESS RATE PRESSURE PRODUCT: NORMAL BPM*MMHG
STRESS TARGET HR: 153 BPM
TID: 1.44
TSH SERPL DL<=0.05 MIU/L-ACNC: 1.99 UIU/ML (ref 0.27–4.2)
WBC OTHER # BLD: 5.8 K/UL (ref 4.5–11.5)

## 2024-04-10 PROCEDURE — 93017 CV STRESS TEST TRACING ONLY: CPT

## 2024-04-10 PROCEDURE — 93018 CV STRESS TEST I&R ONLY: CPT | Performed by: INTERNAL MEDICINE

## 2024-04-10 PROCEDURE — 78452 HT MUSCLE IMAGE SPECT MULT: CPT

## 2024-04-10 PROCEDURE — 93016 CV STRESS TEST SUPVJ ONLY: CPT | Performed by: INTERNAL MEDICINE

## 2024-04-10 PROCEDURE — 6360000004 HC RX CONTRAST MEDICATION: Performed by: RADIOLOGY

## 2024-04-10 PROCEDURE — 6360000002 HC RX W HCPCS: Performed by: FAMILY MEDICINE

## 2024-04-10 PROCEDURE — A9500 TC99M SESTAMIBI: HCPCS | Performed by: RADIOLOGY

## 2024-04-10 PROCEDURE — 84443 ASSAY THYROID STIM HORMONE: CPT

## 2024-04-10 PROCEDURE — 2580000003 HC RX 258

## 2024-04-10 PROCEDURE — 99222 1ST HOSP IP/OBS MODERATE 55: CPT | Performed by: TRANSPLANT SURGERY

## 2024-04-10 PROCEDURE — 78452 HT MUSCLE IMAGE SPECT MULT: CPT | Performed by: INTERNAL MEDICINE

## 2024-04-10 PROCEDURE — 6370000000 HC RX 637 (ALT 250 FOR IP)

## 2024-04-10 PROCEDURE — 74178 CT ABD&PLV WO CNTR FLWD CNTR: CPT

## 2024-04-10 PROCEDURE — 83735 ASSAY OF MAGNESIUM: CPT

## 2024-04-10 PROCEDURE — 97161 PT EVAL LOW COMPLEX 20 MIN: CPT

## 2024-04-10 PROCEDURE — 3430000000 HC RX DIAGNOSTIC RADIOPHARMACEUTICAL: Performed by: RADIOLOGY

## 2024-04-10 PROCEDURE — 99222 1ST HOSP IP/OBS MODERATE 55: CPT | Performed by: FAMILY MEDICINE

## 2024-04-10 PROCEDURE — 80053 COMPREHEN METABOLIC PANEL: CPT

## 2024-04-10 PROCEDURE — 1200000000 HC SEMI PRIVATE

## 2024-04-10 PROCEDURE — 97165 OT EVAL LOW COMPLEX 30 MIN: CPT

## 2024-04-10 PROCEDURE — 85025 COMPLETE CBC W/AUTO DIFF WBC: CPT

## 2024-04-10 RX ORDER — TETRAKIS(2-METHOXYISOBUTYLISOCYANIDE)COPPER(I) TETRAFLUOROBORATE 1 MG/ML
30 INJECTION, POWDER, LYOPHILIZED, FOR SOLUTION INTRAVENOUS
Status: COMPLETED | OUTPATIENT
Start: 2024-04-10 | End: 2024-04-10

## 2024-04-10 RX ORDER — TETRAKIS(2-METHOXYISOBUTYLISOCYANIDE)COPPER(I) TETRAFLUOROBORATE 1 MG/ML
10 INJECTION, POWDER, LYOPHILIZED, FOR SOLUTION INTRAVENOUS
Status: COMPLETED | OUTPATIENT
Start: 2024-04-10 | End: 2024-04-10

## 2024-04-10 RX ORDER — DEXTROSE AND SODIUM CHLORIDE 5; .9 G/100ML; G/100ML
INJECTION, SOLUTION INTRAVENOUS CONTINUOUS
Status: DISCONTINUED | OUTPATIENT
Start: 2024-04-10 | End: 2024-04-14

## 2024-04-10 RX ORDER — LORAZEPAM 1 MG/1
1 TABLET ORAL NIGHTLY
Status: DISCONTINUED | OUTPATIENT
Start: 2024-04-10 | End: 2024-04-14 | Stop reason: HOSPADM

## 2024-04-10 RX ORDER — REGADENOSON 0.08 MG/ML
0.4 INJECTION, SOLUTION INTRAVENOUS
Status: COMPLETED | OUTPATIENT
Start: 2024-04-10 | End: 2024-04-10

## 2024-04-10 RX ORDER — LORAZEPAM 0.5 MG/1
0.5 TABLET ORAL DAILY PRN
Status: DISCONTINUED | OUTPATIENT
Start: 2024-04-10 | End: 2024-04-14

## 2024-04-10 RX ADMIN — Medication 10 MILLICURIE: at 08:26

## 2024-04-10 RX ADMIN — SODIUM CHLORIDE, PRESERVATIVE FREE 10 ML: 5 INJECTION INTRAVENOUS at 21:22

## 2024-04-10 RX ADMIN — DEXTROSE AND SODIUM CHLORIDE: 5; 900 INJECTION, SOLUTION INTRAVENOUS at 12:00

## 2024-04-10 RX ADMIN — IOPAMIDOL 18 ML: 755 INJECTION, SOLUTION INTRAVENOUS at 17:30

## 2024-04-10 RX ADMIN — PANTOPRAZOLE SODIUM 40 MG: 40 TABLET, DELAYED RELEASE ORAL at 05:59

## 2024-04-10 RX ADMIN — Medication 30 MILLICURIE: at 08:26

## 2024-04-10 RX ADMIN — IOPAMIDOL 75 ML: 755 INJECTION, SOLUTION INTRAVENOUS at 17:30

## 2024-04-10 RX ADMIN — LORAZEPAM 1 MG: 1 TABLET ORAL at 21:22

## 2024-04-10 RX ADMIN — REGADENOSON 0.4 MG: 0.08 INJECTION, SOLUTION INTRAVENOUS at 10:07

## 2024-04-10 ASSESSMENT — PAIN SCALES - GENERAL: PAINLEVEL_OUTOF10: 2

## 2024-04-10 NOTE — PLAN OF CARE
Problem: Discharge Planning  Goal: Discharge to home or other facility with appropriate resources  Outcome: Progressing  Flowsheets (Taken 4/9/2024 1245 by Pinky Lang, RN)  Discharge to home or other facility with appropriate resources: Refer to discharge planning if patient needs post-hospital services based on physician order or complex needs related to functional status, cognitive ability or social support system     Problem: Safety - Adult  Goal: Free from fall injury  Outcome: Progressing     Problem: ABCDS Injury Assessment  Goal: Absence of physical injury  Outcome: Progressing     Problem: Pain  Goal: Verbalizes/displays adequate comfort level or baseline comfort level  Outcome: Progressing

## 2024-04-10 NOTE — DISCHARGE INSTRUCTIONS
Call Dr Cohen /Dr. Rowland's office to scheduled follow up appointment [phone] or [phone]

## 2024-04-10 NOTE — CARE COORDINATION
Transition of Care-Plan for Stress test today, await results. GI is following. Patient is independent from home with . No anticipated discharge needs. ELOS 24-48 hrs.    Yuko GOMESN, RN  [facility]

## 2024-04-11 LAB
ALBUMIN SERPL-MCNC: 3.7 G/DL (ref 3.5–5.2)
ALP SERPL-CCNC: 55 U/L (ref 35–104)
ALT SERPL-CCNC: 7 U/L (ref 0–32)
ANION GAP SERPL CALCULATED.3IONS-SCNC: 8 MMOL/L (ref 7–16)
AST SERPL-CCNC: 14 U/L (ref 0–31)
BASOPHILS # BLD: 0.03 K/UL (ref 0–0.2)
BASOPHILS NFR BLD: 1 % (ref 0–2)
BILIRUB SERPL-MCNC: 0.4 MG/DL (ref 0–1.2)
BUN SERPL-MCNC: 16 MG/DL (ref 6–23)
CALCIUM SERPL-MCNC: 8.7 MG/DL (ref 8.6–10.2)
CHLORIDE SERPL-SCNC: 109 MMOL/L (ref 98–107)
CO2 SERPL-SCNC: 23 MMOL/L (ref 22–29)
CREAT SERPL-MCNC: 0.7 MG/DL (ref 0.5–1)
EOSINOPHIL # BLD: 0.15 K/UL (ref 0.05–0.5)
EOSINOPHILS RELATIVE PERCENT: 3 % (ref 0–6)
ERYTHROCYTE [DISTWIDTH] IN BLOOD BY AUTOMATED COUNT: 13.7 % (ref 11.5–15)
GFR SERPL CREATININE-BSD FRML MDRD: >90 ML/MIN/1.73M2
GLUCOSE SERPL-MCNC: 81 MG/DL (ref 74–99)
HCT VFR BLD AUTO: 34.9 % (ref 34–48)
HGB BLD-MCNC: 11.9 G/DL (ref 11.5–15.5)
IMM GRANULOCYTES # BLD AUTO: <0.03 K/UL (ref 0–0.58)
IMM GRANULOCYTES NFR BLD: 0 % (ref 0–5)
LYMPHOCYTES NFR BLD: 0.94 K/UL (ref 1.5–4)
LYMPHOCYTES RELATIVE PERCENT: 18 % (ref 20–42)
MAGNESIUM SERPL-MCNC: 1.9 MG/DL (ref 1.6–2.6)
MCH RBC QN AUTO: 31.1 PG (ref 26–35)
MCHC RBC AUTO-ENTMCNC: 34.1 G/DL (ref 32–34.5)
MCV RBC AUTO: 91.1 FL (ref 80–99.9)
MONOCYTES NFR BLD: 0.56 K/UL (ref 0.1–0.95)
MONOCYTES NFR BLD: 11 % (ref 2–12)
NEUTROPHILS NFR BLD: 68 % (ref 43–80)
NEUTS SEG NFR BLD: 3.59 K/UL (ref 1.8–7.3)
PLATELET # BLD AUTO: 237 K/UL (ref 130–450)
PMV BLD AUTO: 10.6 FL (ref 7–12)
POTASSIUM SERPL-SCNC: 3.5 MMOL/L (ref 3.5–5)
PROT SERPL-MCNC: 5.4 G/DL (ref 6.4–8.3)
RBC # BLD AUTO: 3.83 M/UL (ref 3.5–5.5)
SODIUM SERPL-SCNC: 140 MMOL/L (ref 132–146)
WBC OTHER # BLD: 5.3 K/UL (ref 4.5–11.5)

## 2024-04-11 PROCEDURE — APPSS45 APP SPLIT SHARED TIME 31-45 MINUTES: Performed by: CLINICAL NURSE SPECIALIST

## 2024-04-11 PROCEDURE — 99232 SBSQ HOSP IP/OBS MODERATE 35: CPT | Performed by: TRANSPLANT SURGERY

## 2024-04-11 PROCEDURE — 6360000002 HC RX W HCPCS

## 2024-04-11 PROCEDURE — 2580000003 HC RX 258

## 2024-04-11 PROCEDURE — 6370000000 HC RX 637 (ALT 250 FOR IP)

## 2024-04-11 PROCEDURE — 85025 COMPLETE CBC W/AUTO DIFF WBC: CPT

## 2024-04-11 PROCEDURE — 1200000000 HC SEMI PRIVATE

## 2024-04-11 PROCEDURE — 6370000000 HC RX 637 (ALT 250 FOR IP): Performed by: CLINICAL NURSE SPECIALIST

## 2024-04-11 PROCEDURE — 80053 COMPREHEN METABOLIC PANEL: CPT

## 2024-04-11 PROCEDURE — 99231 SBSQ HOSP IP/OBS SF/LOW 25: CPT | Performed by: FAMILY MEDICINE

## 2024-04-11 PROCEDURE — 83735 ASSAY OF MAGNESIUM: CPT

## 2024-04-11 RX ORDER — FAMOTIDINE 20 MG/1
20 TABLET, FILM COATED ORAL 2 TIMES DAILY
Status: DISCONTINUED | OUTPATIENT
Start: 2024-04-11 | End: 2024-04-14 | Stop reason: HOSPADM

## 2024-04-11 RX ORDER — BISACODYL 10 MG
10 SUPPOSITORY, RECTAL RECTAL ONCE
Status: COMPLETED | OUTPATIENT
Start: 2024-04-11 | End: 2024-04-11

## 2024-04-11 RX ADMIN — SODIUM CHLORIDE, PRESERVATIVE FREE 10 ML: 5 INJECTION INTRAVENOUS at 21:13

## 2024-04-11 RX ADMIN — ENOXAPARIN SODIUM 30 MG: 100 INJECTION SUBCUTANEOUS at 08:33

## 2024-04-11 RX ADMIN — FAMOTIDINE 20 MG: 20 TABLET ORAL at 08:33

## 2024-04-11 RX ADMIN — SERTRALINE HYDROCHLORIDE 25 MG: 50 TABLET ORAL at 21:14

## 2024-04-11 RX ADMIN — SODIUM CHLORIDE, PRESERVATIVE FREE 10 ML: 5 INJECTION INTRAVENOUS at 08:37

## 2024-04-11 RX ADMIN — NORTRIPTYLINE HYDROCHLORIDE 25 MG: 25 CAPSULE ORAL at 08:33

## 2024-04-11 RX ADMIN — ONDANSETRON 4 MG: 2 INJECTION INTRAMUSCULAR; INTRAVENOUS at 20:40

## 2024-04-11 RX ADMIN — ONDANSETRON 4 MG: 2 INJECTION INTRAMUSCULAR; INTRAVENOUS at 06:14

## 2024-04-11 RX ADMIN — FAMOTIDINE 20 MG: 20 TABLET ORAL at 21:14

## 2024-04-11 RX ADMIN — LORAZEPAM 1 MG: 1 TABLET ORAL at 21:14

## 2024-04-11 RX ADMIN — BISACODYL 10 MG: 10 SUPPOSITORY RECTAL at 11:11

## 2024-04-11 ASSESSMENT — PAIN SCALES - GENERAL
PAINLEVEL_OUTOF10: 5
PAINLEVEL_OUTOF10: 3

## 2024-04-11 ASSESSMENT — PAIN DESCRIPTION - DESCRIPTORS: DESCRIPTORS: DISCOMFORT

## 2024-04-11 ASSESSMENT — PAIN DESCRIPTION - ORIENTATION: ORIENTATION: MID

## 2024-04-11 ASSESSMENT — PAIN DESCRIPTION - LOCATION
LOCATION: ABDOMEN
LOCATION: CHEST

## 2024-04-11 NOTE — CARE COORDINATION
Transition of Care- Per GI note-\"Hepatobiliary Surgery consulted, they do not believe she needs endoscopic ultrasound or ERCP, recommended HIDA scan and nonemergent colonoscopy in outpatient setting  Patient refusing HIDA scan, however is agreeable to colonoscopy, advised to discuss this with her GI doctor\". This can all be done outpatient. Hopeful discharge home today, no needs.

## 2024-04-11 NOTE — PLAN OF CARE
Problem: Safety - Adult  Goal: Free from fall injury  Outcome: Progressing     Problem: ABCDS Injury Assessment  Goal: Absence of physical injury  Outcome: Progressing     Problem: Nutrition Deficit:  Goal: Optimize nutritional status  Outcome: Progressing

## 2024-04-12 ENCOUNTER — APPOINTMENT (OUTPATIENT)
Dept: NUCLEAR MEDICINE | Age: 69
DRG: 391 | End: 2024-04-12
Attending: FAMILY MEDICINE
Payer: MEDICARE

## 2024-04-12 PROBLEM — R07.9 CHEST PAIN: Status: RESOLVED | Noted: 2024-04-09 | Resolved: 2024-04-12

## 2024-04-12 LAB
ALBUMIN SERPL-MCNC: 3.6 G/DL (ref 3.5–5.2)
ALP SERPL-CCNC: 54 U/L (ref 35–104)
ALT SERPL-CCNC: 10 U/L (ref 0–32)
ANION GAP SERPL CALCULATED.3IONS-SCNC: 6 MMOL/L (ref 7–16)
AST SERPL-CCNC: 16 U/L (ref 0–31)
BASOPHILS # BLD: 0.04 K/UL (ref 0–0.2)
BASOPHILS NFR BLD: 1 % (ref 0–2)
BILIRUB SERPL-MCNC: 0.4 MG/DL (ref 0–1.2)
BUN SERPL-MCNC: 11 MG/DL (ref 6–23)
CALCIUM SERPL-MCNC: 8.7 MG/DL (ref 8.6–10.2)
CHLORIDE SERPL-SCNC: 110 MMOL/L (ref 98–107)
CO2 SERPL-SCNC: 24 MMOL/L (ref 22–29)
CREAT SERPL-MCNC: 0.6 MG/DL (ref 0.5–1)
EOSINOPHIL # BLD: 0.21 K/UL (ref 0.05–0.5)
EOSINOPHILS RELATIVE PERCENT: 4 % (ref 0–6)
ERYTHROCYTE [DISTWIDTH] IN BLOOD BY AUTOMATED COUNT: 13.5 % (ref 11.5–15)
GFR SERPL CREATININE-BSD FRML MDRD: >90 ML/MIN/1.73M2
GLUCOSE SERPL-MCNC: 88 MG/DL (ref 74–99)
HCT VFR BLD AUTO: 35.4 % (ref 34–48)
HGB BLD-MCNC: 12 G/DL (ref 11.5–15.5)
IMM GRANULOCYTES # BLD AUTO: <0.03 K/UL (ref 0–0.58)
IMM GRANULOCYTES NFR BLD: 0 % (ref 0–5)
LYMPHOCYTES NFR BLD: 1.05 K/UL (ref 1.5–4)
LYMPHOCYTES RELATIVE PERCENT: 22 % (ref 20–42)
MAGNESIUM SERPL-MCNC: 1.8 MG/DL (ref 1.6–2.6)
MCH RBC QN AUTO: 30.5 PG (ref 26–35)
MCHC RBC AUTO-ENTMCNC: 33.9 G/DL (ref 32–34.5)
MCV RBC AUTO: 90.1 FL (ref 80–99.9)
MONOCYTES NFR BLD: 0.53 K/UL (ref 0.1–0.95)
MONOCYTES NFR BLD: 11 % (ref 2–12)
NEUTROPHILS NFR BLD: 62 % (ref 43–80)
NEUTS SEG NFR BLD: 2.95 K/UL (ref 1.8–7.3)
PLATELET # BLD AUTO: 231 K/UL (ref 130–450)
PMV BLD AUTO: 10 FL (ref 7–12)
POTASSIUM SERPL-SCNC: 3.6 MMOL/L (ref 3.5–5)
PROT SERPL-MCNC: 5.3 G/DL (ref 6.4–8.3)
RBC # BLD AUTO: 3.93 M/UL (ref 3.5–5.5)
SODIUM SERPL-SCNC: 140 MMOL/L (ref 132–146)
WBC OTHER # BLD: 4.8 K/UL (ref 4.5–11.5)

## 2024-04-12 PROCEDURE — 99232 SBSQ HOSP IP/OBS MODERATE 35: CPT | Performed by: CLINICAL NURSE SPECIALIST

## 2024-04-12 PROCEDURE — 1200000000 HC SEMI PRIVATE

## 2024-04-12 PROCEDURE — 6370000000 HC RX 637 (ALT 250 FOR IP)

## 2024-04-12 PROCEDURE — 83735 ASSAY OF MAGNESIUM: CPT

## 2024-04-12 PROCEDURE — 99231 SBSQ HOSP IP/OBS SF/LOW 25: CPT | Performed by: FAMILY MEDICINE

## 2024-04-12 PROCEDURE — 6360000002 HC RX W HCPCS: Performed by: RADIOLOGY

## 2024-04-12 PROCEDURE — 6360000002 HC RX W HCPCS

## 2024-04-12 PROCEDURE — 3430000000 HC RX DIAGNOSTIC RADIOPHARMACEUTICAL: Performed by: RADIOLOGY

## 2024-04-12 PROCEDURE — 97530 THERAPEUTIC ACTIVITIES: CPT

## 2024-04-12 PROCEDURE — 78227 HEPATOBIL SYST IMAGE W/DRUG: CPT

## 2024-04-12 PROCEDURE — 80053 COMPREHEN METABOLIC PANEL: CPT

## 2024-04-12 PROCEDURE — A9537 TC99M MEBROFENIN: HCPCS | Performed by: RADIOLOGY

## 2024-04-12 PROCEDURE — 85025 COMPLETE CBC W/AUTO DIFF WBC: CPT

## 2024-04-12 PROCEDURE — 2580000003 HC RX 258: Performed by: RADIOLOGY

## 2024-04-12 PROCEDURE — 2580000003 HC RX 258

## 2024-04-12 RX ORDER — DOCUSATE SODIUM 100 MG/1
100 CAPSULE, LIQUID FILLED ORAL DAILY
Status: DISCONTINUED | OUTPATIENT
Start: 2024-04-12 | End: 2024-04-14 | Stop reason: HOSPADM

## 2024-04-12 RX ORDER — POLYETHYLENE GLYCOL 3350 17 G/17G
17 POWDER, FOR SOLUTION ORAL DAILY
Status: DISCONTINUED | OUTPATIENT
Start: 2024-04-12 | End: 2024-04-14 | Stop reason: HOSPADM

## 2024-04-12 RX ADMIN — POLYETHYLENE GLYCOL 3350 17 G: 17 POWDER, FOR SOLUTION ORAL at 13:09

## 2024-04-12 RX ADMIN — FAMOTIDINE 20 MG: 20 TABLET ORAL at 13:10

## 2024-04-12 RX ADMIN — FAMOTIDINE 20 MG: 20 TABLET ORAL at 20:48

## 2024-04-12 RX ADMIN — DOCUSATE SODIUM 100 MG: 100 CAPSULE, LIQUID FILLED ORAL at 13:09

## 2024-04-12 RX ADMIN — SINCALIDE 0.91 MCG: 5 INJECTION, POWDER, LYOPHILIZED, FOR SOLUTION INTRAVENOUS at 11:54

## 2024-04-12 RX ADMIN — NORTRIPTYLINE HYDROCHLORIDE 25 MG: 25 CAPSULE ORAL at 13:09

## 2024-04-12 RX ADMIN — DEXTROSE AND SODIUM CHLORIDE: 5; 900 INJECTION, SOLUTION INTRAVENOUS at 10:05

## 2024-04-12 RX ADMIN — SERTRALINE HYDROCHLORIDE 25 MG: 50 TABLET ORAL at 20:49

## 2024-04-12 RX ADMIN — Medication 6 MILLICURIE: at 10:37

## 2024-04-12 RX ADMIN — LORAZEPAM 1 MG: 1 TABLET ORAL at 20:48

## 2024-04-12 RX ADMIN — ENOXAPARIN SODIUM 30 MG: 100 INJECTION SUBCUTANEOUS at 13:10

## 2024-04-12 ASSESSMENT — PAIN DESCRIPTION - LOCATION: LOCATION: ABDOMEN

## 2024-04-12 ASSESSMENT — PAIN DESCRIPTION - FREQUENCY: FREQUENCY: INTERMITTENT

## 2024-04-12 ASSESSMENT — PAIN DESCRIPTION - ORIENTATION: ORIENTATION: LEFT

## 2024-04-12 ASSESSMENT — PAIN DESCRIPTION - DESCRIPTORS: DESCRIPTORS: DISCOMFORT

## 2024-04-12 ASSESSMENT — PAIN DESCRIPTION - ONSET: ONSET: GRADUAL

## 2024-04-12 ASSESSMENT — PAIN - FUNCTIONAL ASSESSMENT: PAIN_FUNCTIONAL_ASSESSMENT: ACTIVITIES ARE NOT PREVENTED

## 2024-04-12 ASSESSMENT — PAIN DESCRIPTION - PAIN TYPE: TYPE: ACUTE PAIN

## 2024-04-12 ASSESSMENT — PAIN SCALES - GENERAL: PAINLEVEL_OUTOF10: 3

## 2024-04-12 NOTE — PLAN OF CARE
Problem: Discharge Planning  Goal: Discharge to home or other facility with appropriate resources  Outcome: Progressing     Problem: Safety - Adult  Goal: Free from fall injury  Outcome: Progressing     Problem: ABCDS Injury Assessment  Goal: Absence of physical injury  Outcome: Progressing     Problem: Pain  Goal: Verbalizes/displays adequate comfort level or baseline comfort level  Outcome: Progressing     Problem: Nutrition Deficit:  Goal: Optimize nutritional status  Outcome: Progressing

## 2024-04-12 NOTE — CARE COORDINATION
Transition of Care-HIDA today, unable to do yesterday. Anticipate discharge plan home , no needs.  to transport.    Yuko EDWARDS, RN  [facility]

## 2024-04-12 NOTE — PLAN OF CARE
Problem: Discharge Planning  Goal: Discharge to home or other facility with appropriate resources  4/11/2024 2205 by Yaneth Khan RN  Outcome: Progressing  4/11/2024 1058 by Abbey Vela RN  Outcome: Progressing     Problem: Safety - Adult  Goal: Free from fall injury  4/11/2024 2205 by Yaneth Khan RN  Outcome: Progressing  4/11/2024 1058 by Abbey Vela RN  Outcome: Progressing     Problem: ABCDS Injury Assessment  Goal: Absence of physical injury  4/11/2024 2205 by Yaneth Khan RN  Outcome: Progressing  4/11/2024 1058 by Abbey Vela RN  Outcome: Progressing     Problem: Pain  Goal: Verbalizes/displays adequate comfort level or baseline comfort level  4/11/2024 2205 by Yaneth Khan RN  Outcome: Progressing  4/11/2024 1058 by Abbey Vela RN  Outcome: Progressing     Problem: Nutrition Deficit:  Goal: Optimize nutritional status  4/11/2024 2205 by Yaneth Khan RN  Outcome: Progressing  4/11/2024 1058 by Abbey Vela RN  Outcome: Progressing

## 2024-04-13 ENCOUNTER — APPOINTMENT (OUTPATIENT)
Dept: GENERAL RADIOLOGY | Age: 69
DRG: 391 | End: 2024-04-13
Attending: FAMILY MEDICINE
Payer: MEDICARE

## 2024-04-13 PROBLEM — I47.19 PAT (PAROXYSMAL ATRIAL TACHYCARDIA) (HCC): Status: ACTIVE | Noted: 2024-04-13

## 2024-04-13 PROBLEM — K82.8 BILIARY DYSKINESIA: Status: ACTIVE | Noted: 2024-04-13

## 2024-04-13 PROBLEM — R07.89 ATYPICAL CHEST PAIN: Status: ACTIVE | Noted: 2024-04-09

## 2024-04-13 PROBLEM — R06.00 DYSPNEA: Status: ACTIVE | Noted: 2024-04-13

## 2024-04-13 PROBLEM — I47.10 SUPRAVENTRICULAR TACHYCARDIA (HCC): Status: ACTIVE | Noted: 2024-04-13

## 2024-04-13 PROBLEM — R00.0 TACHYCARDIA: Status: ACTIVE | Noted: 2024-04-13

## 2024-04-13 PROBLEM — R06.02 SHORTNESS OF BREATH: Status: ACTIVE | Noted: 2024-04-13

## 2024-04-13 PROBLEM — K22.2 SCHATZKI'S RING: Status: ACTIVE | Noted: 2024-04-13

## 2024-04-13 LAB
ALBUMIN SERPL-MCNC: 3.8 G/DL (ref 3.5–5.2)
ALP SERPL-CCNC: 55 U/L (ref 35–104)
ALT SERPL-CCNC: 12 U/L (ref 0–32)
ANION GAP SERPL CALCULATED.3IONS-SCNC: 6 MMOL/L (ref 7–16)
AST SERPL-CCNC: 15 U/L (ref 0–31)
B.E.: -1.6 MMOL/L (ref -3–3)
BASOPHILS # BLD: 0.04 K/UL (ref 0–0.2)
BASOPHILS NFR BLD: 1 % (ref 0–2)
BILIRUB SERPL-MCNC: 0.4 MG/DL (ref 0–1.2)
BUN SERPL-MCNC: 5 MG/DL (ref 6–23)
CALCIUM SERPL-MCNC: 8.7 MG/DL (ref 8.6–10.2)
CHLORIDE SERPL-SCNC: 109 MMOL/L (ref 98–107)
CO2 SERPL-SCNC: 25 MMOL/L (ref 22–29)
COHB: 0.7 % (ref 0–1.5)
CREAT SERPL-MCNC: 0.6 MG/DL (ref 0.5–1)
CRITICAL: ABNORMAL
D DIMER: <200 NG/ML DDU (ref 0–232)
DATE ANALYZED: ABNORMAL
DATE OF COLLECTION: ABNORMAL
EOSINOPHIL # BLD: 0.22 K/UL (ref 0.05–0.5)
EOSINOPHILS RELATIVE PERCENT: 5 % (ref 0–6)
ERYTHROCYTE [DISTWIDTH] IN BLOOD BY AUTOMATED COUNT: 13.5 % (ref 11.5–15)
GFR SERPL CREATININE-BSD FRML MDRD: >90 ML/MIN/1.73M2
GLUCOSE SERPL-MCNC: 105 MG/DL (ref 74–99)
HCO3: 21 MMOL/L (ref 22–26)
HCT VFR BLD AUTO: 36.3 % (ref 34–48)
HGB BLD-MCNC: 12.3 G/DL (ref 11.5–15.5)
HHB: 2.7 % (ref 0–5)
IMM GRANULOCYTES # BLD AUTO: <0.03 K/UL (ref 0–0.58)
IMM GRANULOCYTES NFR BLD: 0 % (ref 0–5)
LAB: ABNORMAL
LYMPHOCYTES NFR BLD: 0.9 K/UL (ref 1.5–4)
LYMPHOCYTES RELATIVE PERCENT: 19 % (ref 20–42)
Lab: 920
MAGNESIUM SERPL-MCNC: 1.8 MG/DL (ref 1.6–2.6)
MCH RBC QN AUTO: 30.8 PG (ref 26–35)
MCHC RBC AUTO-ENTMCNC: 33.9 G/DL (ref 32–34.5)
MCV RBC AUTO: 90.8 FL (ref 80–99.9)
METHB: 0.3 % (ref 0–1.5)
MODE: ABNORMAL
MONOCYTES NFR BLD: 0.45 K/UL (ref 0.1–0.95)
MONOCYTES NFR BLD: 10 % (ref 2–12)
NEUTROPHILS NFR BLD: 66 % (ref 43–80)
NEUTS SEG NFR BLD: 3.09 K/UL (ref 1.8–7.3)
O2 CONTENT: 18.3 ML/DL
O2 SATURATION: 97.3 % (ref 92–98.5)
O2HB: 96.3 % (ref 94–97)
OPERATOR ID: 1741
PATIENT TEMP: 37 C
PCO2: 29.7 MMHG (ref 35–45)
PH BLOOD GAS: 7.47 (ref 7.35–7.45)
PLATELET # BLD AUTO: 239 K/UL (ref 130–450)
PMV BLD AUTO: 10 FL (ref 7–12)
PO2: 88.7 MMHG (ref 75–100)
POTASSIUM SERPL-SCNC: 3.7 MMOL/L (ref 3.5–5)
PROT SERPL-MCNC: 5.5 G/DL (ref 6.4–8.3)
RBC # BLD AUTO: 4 M/UL (ref 3.5–5.5)
SODIUM SERPL-SCNC: 140 MMOL/L (ref 132–146)
SOURCE, BLOOD GAS: ABNORMAL
THB: 13.5 G/DL (ref 11.5–16.5)
TIME ANALYZED: 926
TROPONIN I SERPL HS-MCNC: 9 NG/L (ref 0–9)
WBC OTHER # BLD: 4.7 K/UL (ref 4.5–11.5)

## 2024-04-13 PROCEDURE — 6370000000 HC RX 637 (ALT 250 FOR IP): Performed by: INTERNAL MEDICINE

## 2024-04-13 PROCEDURE — 82805 BLOOD GASES W/O2 SATURATION: CPT

## 2024-04-13 PROCEDURE — APPSS60 APP SPLIT SHARED TIME 46-60 MINUTES: Performed by: CLINICAL NURSE SPECIALIST

## 2024-04-13 PROCEDURE — 2700000000 HC OXYGEN THERAPY PER DAY

## 2024-04-13 PROCEDURE — 6370000000 HC RX 637 (ALT 250 FOR IP)

## 2024-04-13 PROCEDURE — 80053 COMPREHEN METABOLIC PANEL: CPT

## 2024-04-13 PROCEDURE — 6360000002 HC RX W HCPCS

## 2024-04-13 PROCEDURE — 36415 COLL VENOUS BLD VENIPUNCTURE: CPT

## 2024-04-13 PROCEDURE — 99232 SBSQ HOSP IP/OBS MODERATE 35: CPT | Performed by: TRANSPLANT SURGERY

## 2024-04-13 PROCEDURE — 1200000000 HC SEMI PRIVATE

## 2024-04-13 PROCEDURE — 36600 WITHDRAWAL OF ARTERIAL BLOOD: CPT

## 2024-04-13 PROCEDURE — 99223 1ST HOSP IP/OBS HIGH 75: CPT | Performed by: INTERNAL MEDICINE

## 2024-04-13 PROCEDURE — 2580000003 HC RX 258

## 2024-04-13 PROCEDURE — 85025 COMPLETE CBC W/AUTO DIFF WBC: CPT

## 2024-04-13 PROCEDURE — 71045 X-RAY EXAM CHEST 1 VIEW: CPT

## 2024-04-13 PROCEDURE — 99232 SBSQ HOSP IP/OBS MODERATE 35: CPT | Performed by: FAMILY MEDICINE

## 2024-04-13 PROCEDURE — 83735 ASSAY OF MAGNESIUM: CPT

## 2024-04-13 PROCEDURE — 85379 FIBRIN DEGRADATION QUANT: CPT

## 2024-04-13 PROCEDURE — 93005 ELECTROCARDIOGRAM TRACING: CPT

## 2024-04-13 PROCEDURE — 84484 ASSAY OF TROPONIN QUANT: CPT

## 2024-04-13 RX ORDER — ASPIRIN 81 MG/1
324 TABLET, CHEWABLE ORAL DAILY
Status: DISCONTINUED | OUTPATIENT
Start: 2024-04-13 | End: 2024-04-14

## 2024-04-13 RX ORDER — MORPHINE SULFATE 2 MG/ML
1 INJECTION, SOLUTION INTRAMUSCULAR; INTRAVENOUS ONCE
Status: COMPLETED | OUTPATIENT
Start: 2024-04-13 | End: 2024-04-13

## 2024-04-13 RX ORDER — METOPROLOL SUCCINATE 25 MG/1
12.5 TABLET, EXTENDED RELEASE ORAL DAILY
Status: DISCONTINUED | OUTPATIENT
Start: 2024-04-13 | End: 2024-04-14 | Stop reason: HOSPADM

## 2024-04-13 RX ORDER — MORPHINE SULFATE 2 MG/ML
1 INJECTION, SOLUTION INTRAMUSCULAR; INTRAVENOUS ONCE
Status: DISCONTINUED | OUTPATIENT
Start: 2024-04-13 | End: 2024-04-13

## 2024-04-13 RX ORDER — NITROGLYCERIN 0.4 MG/1
0.4 TABLET SUBLINGUAL EVERY 5 MIN PRN
Status: DISCONTINUED | OUTPATIENT
Start: 2024-04-13 | End: 2024-04-14

## 2024-04-13 RX ADMIN — NITROGLYCERIN 0.4 MG: 0.4 TABLET, ORALLY DISINTEGRATING SUBLINGUAL at 10:03

## 2024-04-13 RX ADMIN — MORPHINE SULFATE 1 MG: 2 INJECTION, SOLUTION INTRAMUSCULAR; INTRAVENOUS at 09:38

## 2024-04-13 RX ADMIN — METOPROLOL SUCCINATE 12.5 MG: 25 TABLET, FILM COATED, EXTENDED RELEASE ORAL at 21:27

## 2024-04-13 RX ADMIN — NORTRIPTYLINE HYDROCHLORIDE 25 MG: 25 CAPSULE ORAL at 08:23

## 2024-04-13 RX ADMIN — SODIUM CHLORIDE, PRESERVATIVE FREE 10 ML: 5 INJECTION INTRAVENOUS at 21:28

## 2024-04-13 RX ADMIN — DOCUSATE SODIUM 100 MG: 100 CAPSULE, LIQUID FILLED ORAL at 08:23

## 2024-04-13 RX ADMIN — POLYETHYLENE GLYCOL 3350 17 G: 17 POWDER, FOR SOLUTION ORAL at 08:23

## 2024-04-13 RX ADMIN — HYDROXYZINE PAMOATE 25 MG: 25 CAPSULE ORAL at 04:31

## 2024-04-13 RX ADMIN — ENOXAPARIN SODIUM 30 MG: 100 INJECTION SUBCUTANEOUS at 08:23

## 2024-04-13 RX ADMIN — NITROGLYCERIN 0.4 MG: 0.4 TABLET, ORALLY DISINTEGRATING SUBLINGUAL at 09:12

## 2024-04-13 RX ADMIN — SODIUM CHLORIDE, PRESERVATIVE FREE 10 ML: 5 INJECTION INTRAVENOUS at 08:24

## 2024-04-13 RX ADMIN — LORAZEPAM 1 MG: 1 TABLET ORAL at 21:27

## 2024-04-13 RX ADMIN — FAMOTIDINE 20 MG: 20 TABLET ORAL at 21:27

## 2024-04-13 RX ADMIN — FAMOTIDINE 20 MG: 20 TABLET ORAL at 08:23

## 2024-04-13 RX ADMIN — ASPIRIN 81 MG CHEWABLE TABLET 324 MG: 81 TABLET CHEWABLE at 09:11

## 2024-04-13 RX ADMIN — DEXTROSE AND SODIUM CHLORIDE: 5; 900 INJECTION, SOLUTION INTRAVENOUS at 00:12

## 2024-04-13 RX ADMIN — SERTRALINE HYDROCHLORIDE 25 MG: 50 TABLET ORAL at 21:27

## 2024-04-13 ASSESSMENT — PAIN DESCRIPTION - LOCATION: LOCATION: CHEST

## 2024-04-13 ASSESSMENT — PAIN SCALES - GENERAL: PAINLEVEL_OUTOF10: 10

## 2024-04-13 NOTE — PLAN OF CARE
Problem: Discharge Planning  Goal: Discharge to home or other facility with appropriate resources  4/13/2024 1937 by Sima Frey RN  Outcome: Progressing  4/13/2024 1656 by Sima Frey RN  Outcome: Progressing     Problem: Safety - Adult  Goal: Free from fall injury  4/13/2024 1937 by Sima Frey RN  Outcome: Progressing  4/13/2024 1656 by Sima Frey RN  Outcome: Progressing     Problem: ABCDS Injury Assessment  Goal: Absence of physical injury  4/13/2024 1937 by Sima Frey RN  Outcome: Progressing  4/13/2024 1656 by Sima Frey RN  Outcome: Progressing     Problem: Pain  Goal: Verbalizes/displays adequate comfort level or baseline comfort level  4/13/2024 1937 by Sima Frey RN  Outcome: Progressing  4/13/2024 1656 by Sima Frey RN  Outcome: Progressing     Problem: Nutrition Deficit:  Goal: Optimize nutritional status  4/13/2024 1937 by Sima Frey RN  Outcome: Progressing  4/13/2024 1656 by Sima Frey RN  Outcome: Progressing

## 2024-04-13 NOTE — CONSULTS
Comprehensive Nutrition Assessment    Type and Reason for Visit:  Initial, Positive Nutrition Screen, Consult (Unintentional Weight Loss)    Nutrition Recommendations/Plan:   Continue current diet as tolerated & monitor  Will add Ensure with all meals, chocolate per pt preference     Malnutrition Assessment:  Malnutrition Status:  Severe malnutrition (04/10/24 1423)    Context:  Chronic Illness     Findings of the 6 clinical characteristics of malnutrition:  Energy Intake:  75% or less estimated energy requirements for 1 month or longer  Weight Loss:  10% over 6 months (-10.1% X 6 months)     Body Fat Loss:  Severe body fat loss (moderate/severe) Orbital, Triceps   Muscle Mass Loss:  Severe muscle mass loss (moderate/severe) Clavicles (pectoralis & deltoids), Temples (temporalis)  Fluid Accumulation:  No significant fluid accumulation     Strength:  Not Performed    Nutrition Assessment:    Pt admits w/ chest pain. GI following for abdominal pain/nausea. Hx anxiety, SI. Will add ONS per discussion w/ pt & monitor.    Nutrition Related Findings:    A&O X4, I&Os not avail, no edema, abd soft/flat, +BS, reduced appetite, poor intake PTA   Wound Type: None       Current Nutrition Intake & Therapies:    Average Meal Intake:  (25-50% per lunch tray observation)  Average Supplements Intake: None Ordered  ADULT DIET; Regular    Anthropometric Measures:  Height: 157.5 cm (5' 2\")  Ideal Body Weight (IBW): 110 lbs (50 kg)    Admission Body Weight: 41.4 kg (91 lb 4.8 oz) (4/9 bed)  Current Body Weight: 42.3 kg (93 lb 3.2 oz) (4/10), 84.7 % IBW. Weight Source: Bed Scale  Current BMI (kg/m2): 17  Usual Body Weight: 47 kg (103 lb 10 oz) (10/9/23 actual per EMR)  % Weight Change (Calculated): -10.1                    BMI Categories: Underweight (BMI less than 22) age over 65    Estimated Daily Nutrient Needs:  Energy Requirements Based On: Kcal/kg  Weight Used for Energy Requirements: Current  Energy (kcal/day):   Weight 
0823    sertraline (ZOLOFT) tablet 25 mg  25 mg Oral Nightly Courtney Zheng MD   25 mg at 04/12/24 2049    dextrose 5 % and 0.9 % sodium chloride infusion   IntraVENous Continuous Amarjit Jerome MD 75 mL/hr at 04/13/24 0012 New Bag at 04/13/24 0012    LORazepam (ATIVAN) tablet 1 mg  1 mg Oral Nightly Alayna Ernandez DO   1 mg at 04/12/24 2048    LORazepam (ATIVAN) tablet 0.5 mg  0.5 mg Oral Daily PRN Germán ErnandezaDO        nortriptyline (PAMELOR) capsule 25 mg  25 mg Oral Daily Abilio Barrett MD   25 mg at 04/13/24 0823    sodium chloride flush 0.9 % injection 5-40 mL  5-40 mL IntraVENous 2 times per day Abilio Barrett MD   10 mL at 04/13/24 0824    sodium chloride flush 0.9 % injection 5-40 mL  5-40 mL IntraVENous PRN Abilio Barrett MD        0.9 % sodium chloride infusion   IntraVENous PRN Abilio Barrett MD        enoxaparin Sodium (LOVENOX) injection 30 mg  30 mg SubCUTAneous Daily Abilio Barrett MD   30 mg at 04/13/24 0823    ondansetron (ZOFRAN-ODT) disintegrating tablet 4 mg  4 mg Oral Q8H PRN Abilio Barrett MD        Or    ondansetron (ZOFRAN) injection 4 mg  4 mg IntraVENous Q6H PRN Abilio Barrett MD   4 mg at 04/11/24 2040    acetaminophen (TYLENOL) tablet 650 mg  650 mg Oral Q6H PRN Abilio Barrett MD        Or    acetaminophen (TYLENOL) suppository 650 mg  650 mg Rectal Q6H PRN Abilio Barrett MD        hydrOXYzine pamoate (VISTARIL) capsule 25 mg  25 mg Oral TID PRN Abilio Barrett MD   25 mg at 04/13/24 0431       Allergies   Allergen Reactions    Xanax Xr [Alprazolam Er] Shortness Of Breath    Codeine      Felt like she was going to pass out.     Dilaudid [Hydromorphone Hcl] Dizziness or Vertigo       Review of Systems:  14 point ROS obtained and neg aside from outlined in the HPI      Physical Exam:  /67   Pulse 69   Temp 98.1 °F (36.7 °C) (Oral)   Resp 12   Ht 1.575 m (5' 2\")   Wt 45.6 kg (100 lb 9.6 oz)   SpO2 100%   BMI 18.40 kg/m²    General: Lying in bed comfortably, 
polydipsia.   Genitourinary:  Negative for frequency, hematuria and urgency.   Musculoskeletal:  Negative for back pain and neck pain.   Skin:  Negative for rash.   Allergic/Immunologic: Negative for environmental allergies.   Neurological:  Negative for tremors and seizures.   Psychiatric/Behavioral:  Negative for hallucinations and suicidal ideas. The patient is not nervous/anxious.        Physical Exam:  BP (!) 133/59   Pulse 81   Temp 99 °F (37.2 °C) (Oral)   Resp 18   Ht 1.575 m (5' 2\")   Wt 42.3 kg (93 lb 3.2 oz)   SpO2 99%   BMI 17.05 kg/m²       PSYCH: mood and affect normal, alert and oriented x 3: No apparent distress, comfortable  EYES: Sclera white, pupils equal round and reactive to light  ENMT:  Hearing normal, trachea midline, ears externally intact  LYMPH: no obvious lympadenopathy in neck.   RESP: Respiratory effort was normal with no retractions or use of accessory muscles.  CV:  No pedal edema  GI/ Abdomen: Soft, nondistended, nontender, no guarding, no peritoneal signs  MSK: no clubbing/ no cyanosis/ gaitnormal       Assessment/Plan:  Shortness of breath, epigastric and left upper quadrant abdominal pain  -I have reviewed all of her previous images as well as her labs.  Her lipase has always been normal.  She had a CT scan performed which does show a dilated common bile duct.  However the patient's LFTs and lipase are also noted to be normal.  This dates back to many CT scans she has had within the past year.  She also recently had an endoscopy in February 2024 so I doubt that an endoscopic ultrasound would be useful to evaluate her ampulla as this would have been seen on her EGD.   I do not believe an ERCP would be helpful as well because her bilirubin, lipase, and transaminases are normal.  -She is agreeable to have the CT scan performed however we discussed that she could have referred pain from biliary dyskinesia.  The only way to rule out gallbladder dysfunction is with a HIDA. If her 
HIDA and MRI/MRCP if still dilate bile duct.  Ampulla typically not seen well with forward viewing scope unless large lesion, if bile duct remains dilated would recommend eval with EUS and/or ERCP which was set up a few months ago and apparently pt did not follow through with.  I do recommend colonoscopy as well for screening purposes and diagnostic given ongoing issues.  Pt understands cologuard not a perfect test and misses 8% of cancers and more than 50% pre-cancerous polyps.  Hopefully will do HIDA after CT and see from there.  Our service will follow.    Jono Hyatt D.O.  4/10/24  
patient's medications and allergies were reviewed as well as that of her past medical history and review of systems as documented.    On examination, while continuously complaining with dyspnea, she was neither tachypneic with conversation nor incapable of completing entire sentences.  She is hemodynamically stable with vital signs as documented.  Jugular venous pressure is normal with no identified carotid bruits.  Lung fields are clear to auscultation.  Cardiac examination is noted for a regular rate and rhythm with no gallop rhythm or cardiac murmur.  A benign abdominal examination is present and no peripheral edema noted.    Diagnostic Assessment and Plan: On a clinical basis, the patient presents with reports of dyspnea disproportionate to that of her objective picture and with no evidence of significant cardiovascular origins.  Objectively paroxysmal supraventricular tachycardia has been documented in the absence of significant metabolic derangements with recommendations of initiation of small doses of a beta-blocker to assist arrhythmia stabilization.  No additional cardiovascular assessment is presently further indicated and she would be an acceptable candidate for additional gastroenterology if surgical intervention is indicated.  Additional management of noncardiovascular issues will be deferred to primary care and we will further evaluate her should additional cardiovascular difficulties or concerns arise and with outpatient follow-up with her primary cardiologist, Delfina Garcia should additional concerns develop.    I have participated in a substantive portion of the present encounter inclusive of a component of independent history and examination in addition to that of medical decision making regarding patient care.    Thank you for allowing me to participate in your patient's care. Please feel free to contact me if you have any questions or concerns.    Kyaw Montana MD  [facility]

## 2024-04-13 NOTE — PLAN OF CARE
Problem: Discharge Planning  Goal: Discharge to home or other facility with appropriate resources  4/12/2024 2259 by Karma Lopez RN  Outcome: Progressing  4/12/2024 1455 by Mihaela Veronica, RN  Outcome: Progressing     Problem: Safety - Adult  Goal: Free from fall injury  4/12/2024 2259 by Karma Lopez RN  Outcome: Progressing  4/12/2024 1455 by Mihaela Veronica, RN  Outcome: Progressing     Problem: ABCDS Injury Assessment  Goal: Absence of physical injury  4/12/2024 2259 by Karma Lopez RN  Outcome: Progressing  4/12/2024 1455 by Mihaela Veronica, RN  Outcome: Progressing     Problem: Pain  Goal: Verbalizes/displays adequate comfort level or baseline comfort level  4/12/2024 2259 by Karma Lopez RN  Outcome: Progressing  4/12/2024 1455 by Mihaela Veronica, RN  Outcome: Progressing     Problem: Nutrition Deficit:  Goal: Optimize nutritional status  4/12/2024 2259 by Karma Lopez RN  Outcome: Progressing  4/12/2024 1455 by Mihaela Veronica, RN  Outcome: Progressing

## 2024-04-14 VITALS
SYSTOLIC BLOOD PRESSURE: 99 MMHG | HEIGHT: 62 IN | HEART RATE: 68 BPM | RESPIRATION RATE: 16 BRPM | TEMPERATURE: 97.8 F | DIASTOLIC BLOOD PRESSURE: 63 MMHG | BODY MASS INDEX: 18.51 KG/M2 | OXYGEN SATURATION: 99 % | WEIGHT: 100.6 LBS

## 2024-04-14 PROBLEM — R10.84 GENERALIZED ABDOMINAL PAIN: Status: RESOLVED | Noted: 2023-08-17 | Resolved: 2024-04-14

## 2024-04-14 PROBLEM — I47.19 PAT (PAROXYSMAL ATRIAL TACHYCARDIA) (HCC): Status: RESOLVED | Noted: 2024-04-13 | Resolved: 2024-04-14

## 2024-04-14 PROBLEM — R07.89 ATYPICAL CHEST PAIN: Status: RESOLVED | Noted: 2024-04-09 | Resolved: 2024-04-14

## 2024-04-14 PROBLEM — R00.0 TACHYCARDIA: Status: RESOLVED | Noted: 2024-04-13 | Resolved: 2024-04-14

## 2024-04-14 LAB
ALBUMIN SERPL-MCNC: 3.8 G/DL (ref 3.5–5.2)
ALP SERPL-CCNC: 56 U/L (ref 35–104)
ALT SERPL-CCNC: 15 U/L (ref 0–32)
ANION GAP SERPL CALCULATED.3IONS-SCNC: 4 MMOL/L (ref 7–16)
AST SERPL-CCNC: 18 U/L (ref 0–31)
BASOPHILS # BLD: 0.05 K/UL (ref 0–0.2)
BASOPHILS NFR BLD: 1 % (ref 0–2)
BILIRUB SERPL-MCNC: 0.3 MG/DL (ref 0–1.2)
BUN SERPL-MCNC: 6 MG/DL (ref 6–23)
CALCIUM SERPL-MCNC: 8.9 MG/DL (ref 8.6–10.2)
CHLORIDE SERPL-SCNC: 108 MMOL/L (ref 98–107)
CO2 SERPL-SCNC: 27 MMOL/L (ref 22–29)
CREAT SERPL-MCNC: 0.7 MG/DL (ref 0.5–1)
EOSINOPHIL # BLD: 0.3 K/UL (ref 0.05–0.5)
EOSINOPHILS RELATIVE PERCENT: 5 % (ref 0–6)
ERYTHROCYTE [DISTWIDTH] IN BLOOD BY AUTOMATED COUNT: 13.6 % (ref 11.5–15)
GFR SERPL CREATININE-BSD FRML MDRD: >90 ML/MIN/1.73M2
GLUCOSE SERPL-MCNC: 98 MG/DL (ref 74–99)
HCT VFR BLD AUTO: 37.1 % (ref 34–48)
HGB BLD-MCNC: 12.3 G/DL (ref 11.5–15.5)
IMM GRANULOCYTES # BLD AUTO: <0.03 K/UL (ref 0–0.58)
IMM GRANULOCYTES NFR BLD: 0 % (ref 0–5)
LYMPHOCYTES NFR BLD: 0.82 K/UL (ref 1.5–4)
LYMPHOCYTES RELATIVE PERCENT: 14 % (ref 20–42)
MAGNESIUM SERPL-MCNC: 1.8 MG/DL (ref 1.6–2.6)
MCH RBC QN AUTO: 30.4 PG (ref 26–35)
MCHC RBC AUTO-ENTMCNC: 33.2 G/DL (ref 32–34.5)
MCV RBC AUTO: 91.8 FL (ref 80–99.9)
MONOCYTES NFR BLD: 0.51 K/UL (ref 0.1–0.95)
MONOCYTES NFR BLD: 9 % (ref 2–12)
NEUTROPHILS NFR BLD: 71 % (ref 43–80)
NEUTS SEG NFR BLD: 4.07 K/UL (ref 1.8–7.3)
PLATELET # BLD AUTO: 238 K/UL (ref 130–450)
PMV BLD AUTO: 10.2 FL (ref 7–12)
POTASSIUM SERPL-SCNC: 4 MMOL/L (ref 3.5–5)
PROT SERPL-MCNC: 5.6 G/DL (ref 6.4–8.3)
RBC # BLD AUTO: 4.04 M/UL (ref 3.5–5.5)
SODIUM SERPL-SCNC: 139 MMOL/L (ref 132–146)
WBC OTHER # BLD: 5.8 K/UL (ref 4.5–11.5)

## 2024-04-14 PROCEDURE — 6370000000 HC RX 637 (ALT 250 FOR IP)

## 2024-04-14 PROCEDURE — 99238 HOSP IP/OBS DSCHRG MGMT 30/<: CPT | Performed by: FAMILY MEDICINE

## 2024-04-14 PROCEDURE — 83735 ASSAY OF MAGNESIUM: CPT

## 2024-04-14 PROCEDURE — 80053 COMPREHEN METABOLIC PANEL: CPT

## 2024-04-14 PROCEDURE — 2580000003 HC RX 258

## 2024-04-14 PROCEDURE — 99232 SBSQ HOSP IP/OBS MODERATE 35: CPT | Performed by: INTERNAL MEDICINE

## 2024-04-14 PROCEDURE — 6360000002 HC RX W HCPCS

## 2024-04-14 PROCEDURE — 36415 COLL VENOUS BLD VENIPUNCTURE: CPT

## 2024-04-14 PROCEDURE — 85025 COMPLETE CBC W/AUTO DIFF WBC: CPT

## 2024-04-14 PROCEDURE — 2700000000 HC OXYGEN THERAPY PER DAY

## 2024-04-14 PROCEDURE — 6370000000 HC RX 637 (ALT 250 FOR IP): Performed by: INTERNAL MEDICINE

## 2024-04-14 PROCEDURE — 86665 EPSTEIN-BARR CAPSID VCA: CPT

## 2024-04-14 RX ORDER — FAMOTIDINE 20 MG/1
20 TABLET, FILM COATED ORAL 2 TIMES DAILY
Qty: 60 TABLET | Refills: 0 | Status: SHIPPED | OUTPATIENT
Start: 2024-04-14

## 2024-04-14 RX ORDER — PSEUDOEPHEDRINE HCL 30 MG
100 TABLET ORAL DAILY
Qty: 30 CAPSULE | Refills: 0 | Status: SHIPPED | OUTPATIENT
Start: 2024-04-15

## 2024-04-14 RX ORDER — LORAZEPAM 0.5 MG/1
0.5 TABLET ORAL 3 TIMES DAILY
Status: DISCONTINUED | OUTPATIENT
Start: 2024-04-14 | End: 2024-04-14

## 2024-04-14 RX ORDER — LORAZEPAM 1 MG/1
1 TABLET ORAL NIGHTLY
Qty: 30 TABLET | Refills: 0 | Status: CANCELLED | OUTPATIENT
Start: 2024-04-14 | End: 2024-05-14

## 2024-04-14 RX ORDER — LORAZEPAM 0.5 MG/1
1 TABLET ORAL NIGHTLY PRN
Qty: 60 TABLET | Refills: 0 | Status: SHIPPED
Start: 2024-04-14 | End: 2024-05-14

## 2024-04-14 RX ORDER — LORAZEPAM 0.5 MG/1
0.5 TABLET ORAL 2 TIMES DAILY
Qty: 60 TABLET | Refills: 0 | Status: SHIPPED | OUTPATIENT
Start: 2024-04-14 | End: 2024-05-14

## 2024-04-14 RX ORDER — SERTRALINE HYDROCHLORIDE 25 MG/1
25 TABLET, FILM COATED ORAL NIGHTLY
Qty: 30 TABLET | Refills: 0 | Status: SHIPPED | OUTPATIENT
Start: 2024-04-14

## 2024-04-14 RX ORDER — METOPROLOL SUCCINATE 25 MG/1
12.5 TABLET, EXTENDED RELEASE ORAL DAILY
Qty: 30 TABLET | Refills: 0 | Status: SHIPPED | OUTPATIENT
Start: 2024-04-15

## 2024-04-14 RX ORDER — POLYETHYLENE GLYCOL 3350 17 G/17G
17 POWDER, FOR SOLUTION ORAL DAILY
Qty: 30 EACH | Refills: 0 | Status: SHIPPED | OUTPATIENT
Start: 2024-04-15 | End: 2024-05-15

## 2024-04-14 RX ORDER — LORAZEPAM 0.5 MG/1
0.5 TABLET ORAL 2 TIMES DAILY
Status: DISCONTINUED | OUTPATIENT
Start: 2024-04-14 | End: 2024-04-14 | Stop reason: HOSPADM

## 2024-04-14 RX ADMIN — METOPROLOL SUCCINATE 12.5 MG: 25 TABLET, FILM COATED, EXTENDED RELEASE ORAL at 07:50

## 2024-04-14 RX ADMIN — ASPIRIN 81 MG CHEWABLE TABLET 324 MG: 81 TABLET CHEWABLE at 07:50

## 2024-04-14 RX ADMIN — ENOXAPARIN SODIUM 30 MG: 100 INJECTION SUBCUTANEOUS at 07:50

## 2024-04-14 RX ADMIN — FAMOTIDINE 20 MG: 20 TABLET ORAL at 07:50

## 2024-04-14 RX ADMIN — DOCUSATE SODIUM 100 MG: 100 CAPSULE, LIQUID FILLED ORAL at 07:50

## 2024-04-14 RX ADMIN — NORTRIPTYLINE HYDROCHLORIDE 25 MG: 25 CAPSULE ORAL at 07:50

## 2024-04-14 RX ADMIN — DEXTROSE AND SODIUM CHLORIDE: 5; 900 INJECTION, SOLUTION INTRAVENOUS at 03:36

## 2024-04-14 RX ADMIN — LORAZEPAM 0.5 MG: 0.5 TABLET ORAL at 09:36

## 2024-04-14 RX ADMIN — POLYETHYLENE GLYCOL 3350 17 G: 17 POWDER, FOR SOLUTION ORAL at 07:50

## 2024-04-14 RX ADMIN — SODIUM CHLORIDE, PRESERVATIVE FREE 10 ML: 5 INJECTION INTRAVENOUS at 07:50

## 2024-04-14 NOTE — PLAN OF CARE
Problem: Discharge Planning  Goal: Discharge to home or other facility with appropriate resources  4/14/2024 1048 by Sima Fery RN  Outcome: Adequate for Discharge  Flowsheets (Taken 4/14/2024 0750)  Discharge to home or other facility with appropriate resources: Refer to discharge planning if patient needs post-hospital services based on physician order or complex needs related to functional status, cognitive ability or social support system  4/13/2024 2231 by Humaira Jarvis RN  Outcome: Progressing     Problem: Safety - Adult  Goal: Free from fall injury  4/14/2024 1048 by Sima Frey RN  Outcome: Adequate for Discharge  4/13/2024 2231 by Humaira Jarvis RN  Outcome: Progressing     Problem: ABCDS Injury Assessment  Goal: Absence of physical injury  4/14/2024 1048 by Sima Frey RN  Outcome: Adequate for Discharge  4/13/2024 2231 by Humaira Jarvis RN  Outcome: Progressing     Problem: Pain  Goal: Verbalizes/displays adequate comfort level or baseline comfort level  4/14/2024 1048 by Sima Frey RN  Outcome: Adequate for Discharge  4/13/2024 2231 by Humaira Jarvis RN  Outcome: Progressing     Problem: Nutrition Deficit:  Goal: Optimize nutritional status  4/14/2024 1048 by Sima Frey RN  Outcome: Adequate for Discharge  4/13/2024 2231 by Humaira Jarvis RN  Outcome: Progressing

## 2024-04-14 NOTE — PLAN OF CARE
Problem: Discharge Planning  Goal: Discharge to home or other facility with appropriate resources  4/13/2024 2231 by Humaira Jarvis RN  Outcome: Progressing  4/13/2024 1937 by Sima Frey RN  Outcome: Progressing  4/13/2024 1656 by Sima Frey RN  Outcome: Progressing     Problem: Safety - Adult  Goal: Free from fall injury  4/13/2024 2231 by Humaira Jarvis RN  Outcome: Progressing  4/13/2024 1937 by Sima Frey RN  Outcome: Progressing  4/13/2024 1656 by Sima Frey RN  Outcome: Progressing     Problem: ABCDS Injury Assessment  Goal: Absence of physical injury  4/13/2024 2231 by Humaira Jarvis RN  Outcome: Progressing  4/13/2024 1937 by Sima Frey RN  Outcome: Progressing  4/13/2024 1656 by Sima Frey RN  Outcome: Progressing     Problem: Pain  Goal: Verbalizes/displays adequate comfort level or baseline comfort level  4/13/2024 2231 by Humaira Jarvis RN  Outcome: Progressing  4/13/2024 1937 by Sima Frey RN  Outcome: Progressing  4/13/2024 1656 by Sima Frey RN  Outcome: Progressing     Problem: Nutrition Deficit:  Goal: Optimize nutritional status  4/13/2024 2231 by Humaira Jarvis RN  Outcome: Progressing  4/13/2024 1937 by Sima Frey RN  Outcome: Progressing  4/13/2024 1656 by Sima Frey RN  Outcome: Progressing

## 2024-04-14 NOTE — PROGRESS NOTES
[facility]  Progress Note    Chief complaint :  Shortness of breath and chest pain      Subjective:    No overnight problems. Patient describes feeling unchanged. Patient denies chest pain, vomiting, bloody stools, fever, chills, changes in urination, changes in BM. Patient is tolerating diet.    Mrs. Toth was seen asleep in bed, awoken to verbal stimuli.  She states she feels unchanged and remains concerned about her \"shortness of breath\" despite being reassured she is vitally stable, her labs are stable, and she is saturating well.  Patient did have bowel movement overnight.  Patient to talk with hepatobiliary today for possible cholecystectomy plan due to positive HIDA.  Patient very adamant that she cannot go home as she would \"be too weak to return for procedure.\"  Reassured the patient that she would be okay for discharge if cleared with her GI and hepatobiliary specialist.    Of note on morning rounds patient had increased SOB and discomfort with PATs noted on telemetry. Cardiology and pulmonolgy were consulted. Troponin, d dimer ordered. EKG with sinus tachy. Nitro, asa, morphine ordered.     Past medical, surgical, family and social history were reviewed, non-contributory, and unchanged unless otherwise stated.    Review of Systems   Constitutional:  Positive for activity change, appetite change, fatigue and unexpected weight change. Negative for chills and fever.   Respiratory:  Positive for shortness of breath. Negative for cough, chest tightness and wheezing.    Cardiovascular:  Negative for chest pain, palpitations and leg swelling.   Gastrointestinal:  Positive for nausea. Negative for abdominal pain, constipation, diarrhea and vomiting.   Genitourinary:  Negative for difficulty urinating, dysuria and hematuria.   Musculoskeletal:  Negative for back pain and myalgias.   Skin:  Negative for rash.   Neurological:  Negative for dizziness, light-headedness and 
    [facility]  Progress Note    Chief complaint :  Shortness of Breath, Chest Pain    Subjective:    No overnight problems. Patient describes feeling unchanged. Patient denies chest pain, vomiting, fever, chills, changes in urination, changes in BM. Patient is tolerating diet, though with decreased appetite.    Patient states that now she is reassured that there is nothing wrong with her heart based on the stress test, she states that she \"now knows what is really wrong\" and believes \"her esophagus is pushing up on her stomach.\"  Patient has been refusing Protonix as it makes her stomach burn, we will trial Pepcid.  Had extensive discussion with patient regarding recommendation for HIDA scan as well as screening colonoscopy in future, patient would like colonoscopy now while inpatient however continues to refuse HIDA scan.  Advised patient to discuss with GI doctors possible colonoscopy, continue to recommend for HIDA scan.    Past medical, surgical, family and social history were reviewed, non-contributory, and unchanged unless otherwise stated.    Review of Systems   Constitutional:  Positive for activity change, appetite change, fatigue and unexpected weight change. Negative for chills and fever.   Respiratory:  Positive for chest tightness and shortness of breath. Negative for cough and wheezing.    Cardiovascular:  Negative for chest pain, palpitations and leg swelling.   Gastrointestinal:  Positive for nausea. Negative for abdominal pain, constipation, diarrhea and vomiting.   Genitourinary:  Negative for difficulty urinating, dysuria and hematuria.   Musculoskeletal:  Negative for back pain and myalgias.   Skin:  Negative for rash.   Neurological:  Negative for dizziness, light-headedness and headaches.   Psychiatric/Behavioral:  The patient is nervous/anxious.        Objective:  BP (!) 122/59   Pulse 71   Temp 97.7 °F (36.5 °C) (Oral)   Resp 16   Ht 1.575 m (5' 2\")   Wt 
    INPATIENT CARDIOLOGY FOLLOW-UP    Name: Elisa Toth    Age: 69 y.o.    Date of Admission: 4/9/2024 12:39 PM    Date of Service: 4/14/2024    Chief Complaint: Follow-up for paroxysmal supraventricular tachycardia, Schatzki's ring, biliary dyskinesis    Interim History: At the time of her initial assessment, the patient was sleeping and upon awakening, she noted no dyspnea at the time of assessment and remained capable of completing full sentences.  Additional arrhythmia monitoring demonstrates continued transient episodes of paroxysmal supraventricular tachycardia following receipt of only a single dose of her beta-blocker.      Review of Systems:   The remainder of a complete multisystem review including consitutional, central nervous, respiratory, circulatory, gastrointestinal, genitourinary, endocrinologic, hematologic, musculoskeletal and psychiatric are negative.    Problem List:  Patient Active Problem List   Diagnosis    Retinal detachment with retinal defect    Vertigo    Generalized abdominal pain    Anxiety    Portal vein thrombosis    Intractable abdominal pain    Unable to ambulate    Left upper quadrant abdominal pain    Superior mesenteric vein thrombosis (HCC)    Brief psychotic disorder (HCC)    Mood disorder (HCC)    Severe protein-calorie malnutrition (HCC)    Acute chest pain    Atypical chest pain    Dyspnea    Tachycardia    Biliary dyskinesia    PAT (paroxysmal atrial tachycardia) (HCC)    Schatzki's ring    Supraventricular tachycardia (HCC)       Allergies:  Allergies   Allergen Reactions    Xanax Xr [Alprazolam Er] Shortness Of Breath    Codeine      Felt like she was going to pass out.     Dilaudid [Hydromorphone Hcl] Dizziness or Vertigo       Current Medications:  Current Facility-Administered Medications   Medication Dose Route Frequency Provider Last Rate Last Admin    metoprolol succinate (TOPROL XL) extended release tablet 12.5 mg  12.5 mg Oral Daily Kyaw Montana MD   12.5 
    [facility]  Progress Note    Chief complaint :  Short of Breath, Chest Pain      Subjective:    No overnight problems. Patient describes feeling unchanged. Patient denies chest pain, vomiting, fever, chills, changes in urination, changes in BM. Patient is NPO.     Mrs. Toth was seen this morning laying awake in bed.  She states that she is worried as she has feeling of rapid breathing that concerns her however respiratory rate is normal.  Reassured her that her vitals are stable, her heart and lungs sound good, and her low risk stress test is also reassuring.  Despite this patient states that she believes she may need to see a pulmonologist.  I let patient know that that likely was not necessary and that we were to continue with the GI recommendations.  Patient expressed understanding at this time though remains anxious.    Past medical, surgical, family and social history were reviewed, non-contributory, and unchanged unless otherwise stated.    Review of Systems   Constitutional:  Positive for activity change, appetite change, fatigue and unexpected weight change. Negative for chills and fever.   Respiratory:  Positive for shortness of breath. Negative for cough, chest tightness and wheezing.    Cardiovascular:  Negative for chest pain, palpitations and leg swelling.   Gastrointestinal:  Positive for nausea. Negative for abdominal pain, constipation, diarrhea and vomiting.   Genitourinary:  Negative for difficulty urinating, dysuria and hematuria.   Musculoskeletal:  Negative for back pain and myalgias.   Skin:  Negative for rash.   Neurological:  Negative for dizziness, light-headedness and headaches.   Psychiatric/Behavioral:  The patient is nervous/anxious.        Objective:  /67   Pulse 67   Temp 97.5 °F (36.4 °C) (Oral)   Resp 18   Ht 1.575 m (5' 2\")   Wt 45.6 kg (100 lb 9.6 oz)   SpO2 100%   BMI 18.40 kg/m²     Physical Exam  Vitals reviewed. 
   04/13/24 1631   Oxygen Therapy/Pulse Ox   O2 Therapy Oxygen   O2 Device Nasal cannula   O2 Flow Rate (L/min) 2 L/min   Pulse 65   Respirations 16   SpO2 100 %     RT instructed patient for daily NIF and Vital Capacity, anxious stating that \"she will not be able to do this\" before trying. Best of three attempts for NIF -26, vital capacity was done once per patient. 260ml.   
  Physician Discharge Summary  [facility]     Patient ID:  Elisa Toth  [id number]  69 y.o.  1955    Admit date: 4/9/2024    Discharge date: 4/14/2024    Admission Diagnoses:   Chest pain [R07.9]    Discharge Diagnoses:  Principal Problem (Resolved):    Generalized abdominal pain  Active Problems:    Anxiety    Mood disorder (HCC)    Severe protein-calorie malnutrition (HCC)    Dyspnea    Biliary dyskinesia    Schatzki's ring    Supraventricular tachycardia (HCC)  Resolved Problems:    Atypical chest pain    Tachycardia    PAT (paroxysmal atrial tachycardia) ([facility])      Consults: cardiology, pulmonary, and GI  Procedures: None    Hospital Course: 69 year old female who presented for chest pain and shortness of breath and was admitted for New onset atypical chest pain and shortness of breath.    Patient has remarkable PMHx of anxiety, depression, GERD, IBS . Patient was admited to the Family medicine team was put on telemetry, order troponin, D-dimer and BNP which were unremarkable as well as nuclear stress test with myocardial perfusion which showed normal left ventricular perfusion as well as no evidence of inducible ischemia. On a clinical basis patient continue to complain of dyspnea disproportionate to her objective picture and with no evidence of significant cardiovascular origins.  On 04/13 patient mentioned still having some shortness of breath and had sinus tachycardia around 160 bpm. EKG, troponins, D-dimer and ABG were ordered at that time and unremarkable. Pulmonology and cardiology were consulted in view of persistent symptoms despite unchanged baseline, patient required 2 L nasal canunula for comfort with Sat 02 100% and remained stable.  After pulmonology and cardiology evaluation patient was put on Toprol 10.5 mg and did no required further workup, ruling out any evident cardiopulmonary source for her symptoms most likely psychogenic in nature.    GI was 
  Physician Progress Note      PATIENT:               SADI HAIRSTON  CSN #:                  [id number]  :                       1955  ADMIT DATE:       2024 12:39 PM  DISCH DATE:  RESPONDING  PROVIDER #:        SAMINA AMIN          QUERY TEXT:    Pt admitted with shortness of breath, chest pain and abdominal pain. Pt noted   to have \"GERD...Patient with history of epigastric discomfort and acid   reflux...chronic gastritis....\" per H&P. If possible, please document in   progress notes and discharge summary if you are evaluating and /or treating   any of the following:      The medical record reflects the following:  Risk Factors: GERD, Anxiety  Clinical Indicators: H&P \"...Abdominal Pain...GERD History...History of   Non-Occlusive Portal Vein Thrombosis...Patient with history of epigastric   discomfort and acid reflux.  Last EGD 3/5/24 per Dr. Ramos, chronic   gastritis, H.pylori negative, non-obstructive Schatzki's ring...\", GI Consult   Note 4/10 \"... Abdominal pain/nausea/weight loss-Fairly unremarkable upper   endoscopy 2024-History of portal vein thrombosis-Dilated hepatic duct   with questionable liver hypodensities...\", Progress Note 4/10 \"...RUQ US with   mild common bile duct dilation of 8 to 9 mm, no stones o  Treatment: PO Pepcid & Protonix, Labs, GI Consult, US Gallbladder, CT Abdomen   Pelvis      Thank you,  Bill Mcclure, BSN, RN, [facility]  [phone]  Options provided:  -- Abdominal pain due to  -- Abdominal pain due to  -- Abdominal pain due to, please specify cause.  -- Other - I will add my own diagnosis  -- Disagree - Not applicable / Not valid  -- Disagree - Clinically unable to determine / Unknown  -- Refer to Clinical Documentation Reviewer    PROVIDER RESPONSE TEXT:    This patient has abdominal pain due to history of GERD.    Query created by: Bill Mcclure on 2024 12:40 PM      QUERY TEXT:    Patient admitted with shortness of breath, chest pain and abdominal 
0823 - Upon entering room patient is complaining of shortness of breath and not being able to breathe. She said her diaphram is not working right and she cannot catch her breath. Vital signs WNL, administered oxygen at 2L to see if that helps her. She appears to be in no physical distress and is persistent about not being able to breathe. No swelling noted in bilateral lower extremities.     0722 - /70   Pulse 71   Temp 97.9 °F (36.6 °C)   Resp 18   Ht 1.575 m (5' 2\")   Wt 45.6 kg (100 lb 9.6 oz)   SpO2 100%   BMI 18.40 kg/m²      0845 - CMR called and said patient had a run of PAT AT [facility] currently in patient's room, I notified Dr. Brarett at the bedside and gave him a print out of cardiac event.    0915 - Went to draw lab work and give patient nitro and asa, she continues to complain of shortness of breath and said \"I need an airway.\" I told her at this moment we are running more tests and that I would be back with the morphine to see if that would help relieve her discomfort.     0959 - Patient said she \"can't get the air out, its trapped.\"    1000 - /67   Pulse 69   Temp 98.1 °F (36.7 °C) (Oral)   Resp 12   Ht 1.575 m (5' 2\")   Wt 45.6 kg (100 lb 9.6 oz)   SpO2 100%   BMI 18.40 kg/m²         
4 Eyes Skin Assessment     NAME:  Elisa Toth  YOB: 1955  MEDICAL RECORD NUMBER:  [id number]    The patient is being assessed for  Admission    I agree that at least one RN has performed a thorough Head to Toe Skin Assessment on the patient. ALL assessment sites listed below have been assessed.      Areas assessed by both nurses:    Head, Face, Ears, Shoulders, Back, Chest, Arms, Elbows, Hands, Sacrum. Buttock, Coccyx, Ischium, Legs. Feet and Heels, and Under Medical Devices         Does the Patient have a Wound? No noted wound(s)       Edison Prevention initiated by RN: No  Wound Care Orders initiated by RN: No    Pressure Injury (Stage 3,4, Unstageable, DTI, NWPT, and Complex wounds) if present, place Wound referral order by RN under : No    New Ostomies, if present place, Ostomy referral order under : No     Nurse 1 eSignature: Electronically signed by Abbey Vela RN on 4/9/24 at 6:56 PM EDT    **SHARE this note so that the co-signing nurse can place an eSignature**    Nurse 2 eSignature: Electronically signed by Sima Frey RN on 4/13/24 at 12:39 PM EDT    
Database initiated pharmacy and medications verified with the patient. She is A&O comes from home with . States she uses no assistive denies ans is RA at baseline. States she's short of breath and weak all over, she isn't eating and is losing weight. She feels like she can't go on like this.. States she is not suicidal. States she is too short of breath and weak to carry on any more conversations with people..   
Date:4/10/2024  Patient Name: Elisa Toth  MRN: [id number]  : 1955  ROOM #: [id number]    Occupational Therapy order received, chart reviewed and evaluation attempted this date. Patient is unavailable for OT evaluation due to off floor at nuclear medicine. Will re-attempt OT evaluation at a later time. Thank you.     Martha Nolan OTR/L #[id number]    
HIDA results sent via iLike serve to Dr. Cohen. He will discuss with pt  tomorrow and we’ll set her up for getting her gallbladder out next week if she wants it.   
Hepatobiliary and Pancreatic Surgery Progress Note    CC: Shortness of breath    Subjective: Patient states that she still has shortness of breath.  She also had a run of PAT this morning.  Patient's HIDA was positive for biliary dyskinesia.    OBJECTIVE      Physical    /67   Pulse 69   Temp 98.1 °F (36.7 °C) (Oral)   Resp 12   Ht 1.575 m (5' 2\")   Wt 45.6 kg (100 lb 9.6 oz)   SpO2 100%   BMI 18.40 kg/m²       General appearance: appears in no acute distress  Lungs:respiratory effort normal without accessory numbers  Heart: no pedal edema  Abdomen: soft, nondistended, nontympanic, no guarding, no peritoneal signs, normoactive bowel sounds  Extremities: ROM normal    ASSESSMENT: Biliary dyskinesia    PLAN:    -Discussed with patient that this will explain some of her abdominal symptoms however it does not explain her shortness of breath or air feeling trapped.  -Will plan for an elective cholecystectomy  -Also given her symptoms there is no way anesthesia will put her to sleep    Thank you for the consultation and allowing me to take part in Ms. Toth' care.      Nagi Cohen MD 4/13/2024 10:51 AM     
Hepatobiliary and Pancreatic Surgery Progress Note    CC: Shortness of breath and left upper quadrant abdominal pain     Subjective: Patient states her abdominal pain is better today, reports \"slight\" pressure entire mid abdomen and pain epigastric and LUQ regions 5/10.  States she will have her HIDA scan done today.  Reports some shortness of breath, discussed with her to discuss this with PCP and she agrees.  Respirations currently nonlabored without accessory muscles.  Denies nausea or vomiting.  Reports she had a BM, brown yesterday.    OBJECTIVE      Physical    /67   Pulse 67   Temp 97.5 °F (36.4 °C) (Oral)   Resp 18   Ht 1.575 m (5' 2\")   Wt 45.6 kg (100 lb 9.6 oz)   SpO2 100%   BMI 18.40 kg/m²       General appearance: appears in no acute distress  Lungs:respiratory effort normal without accessory numbers  Heart: no pedal edema  Abdomen: soft, nondistended, mild tenderness noted epigastric region to palpation without guarding or rebound, nontympanic, no peritoneal signs, normoactive bowel sounds  Extremities: ROM normal    ASSESSMENT/PLAN:  Shortness of breath, epigastric and left upper quadrant abdominal pain  -I have reviewed all of her previous images as well as her labs.  Her lipase has always been normal.  She had a CT scan performed which does show a dilated common bile duct.  However the patient's LFTs and lipase are also noted to be normal.  This dates back to many CT scans she has had within the past year.  She also recently had an endoscopy in February 2024 so I doubt that an endoscopic ultrasound would be useful to evaluate her ampulla as this would have been seen on her EGD.  I do not believe an ERCP would be helpful as well because her bilirubin, lipase, and transaminases are normal.  -HIDA scan today. Pt agrees to have done. If HIDA scan is positive we will remove her gallbladder.  Long discussion with patient regarding procedure for HIDA scan and what the testing entails, she 
Hepatobiliary and Pancreatic Surgery Progress Note    CC: Shortness of breath and left upper quadrant abdominal pain     Subjective: Patient states she has continuous abdominal pressure entire abdomen 6/10.  Reports intermittent nausea, denies vomiting.  States she was taking some of her diet.  Denies BM x 3 days.  + Flatus.  Long discussion with patient regarding need for HIDA scan and explained entire test and what it entails, she ultimately agrees to try to have the testing done.  States her shortness of breath is somewhat better.    OBJECTIVE      Physical    BP (!) 122/59   Pulse 71   Temp 97.7 °F (36.5 °C) (Oral)   Resp 16   Ht 1.575 m (5' 2\")   Wt 42.3 kg (93 lb 4.1 oz)   SpO2 99%   BMI 17.06 kg/m²       General appearance: appears in no acute distress  Lungs:respiratory effort normal without accessory numbers  Heart: no pedal edema  Abdomen: soft, nondistended, nontympanic, no guarding, no peritoneal signs, normoactive bowel sounds  Extremities: ROM normal    ASSESSMENT/PLAN:  Shortness of breath, epigastric and left upper quadrant abdominal pain  -I have reviewed all of her previous images as well as her labs.  Her lipase has always been normal.  She had a CT scan performed which does show a dilated common bile duct.  However the patient's LFTs and lipase are also noted to be normal.  This dates back to many CT scans she has had within the past year.  She also recently had an endoscopy in February 2024 so I doubt that an endoscopic ultrasound would be useful to evaluate her ampulla as this would have been seen on her EGD.  I do not believe an ERCP would be helpful as well because her bilirubin, lipase, and transaminases are normal.  -She is agreeable to have the CT scan performed however we discussed that she could have referred pain from biliary dyskinesia.  The only way to rule out gallbladder dysfunction is with a HIDA. If her HIDA scan is positive we will remove her gallbladder.  Long discussion 
I called patient's room phone around 04:47 pm and spoke to her regarding results of nuclear stress test with myocardial perfusion performed yesterday.  I explained to the patient that test showed normal left ventricular perfusion as well as no evidence of inducible ischemia.  He mentioned that there is a left ventricular stress perfusion defect present in the mid to distal anteroseptal, inferoseptal and apical septal segments that is fixed and that defect appears to be an artifact.     Patient understood and mentioned she is relief for imaging test results but is still having some shortness of breath.  I explained that's we still working on her clinical condition and that there is important that she is aware that we will rule out any significant/evident cardiac perfusion involvement.  No further concerns or questions at this time.      Courtney Palomino MD   Family Medicine Resident  PGY-1      
OCCUPATIONAL THERAPY INITIAL EVALUATION    [facility]   [address]        Date:4/10/2024                                                  Patient Name: Elisa Toth    MRN: [id number]    : 1955    Room: [address] [place] [address]    Evaluating OT: Martha Nolan OTR/L #[id number]    Referring Provider:  Abilio Barrett MD     Specific Provider Orders/Date:  OT Eval and Treat , 2024     Diagnosis:   1. Chest pain, unspecified type         Surgery: None      Pertinent Medical History: Anxiety, IBS     Precautions:  Fall Risk, alarm      Assessment of current deficits    [x] Functional mobility  [x]ADLs  [x] Strength               []Cognition    [x] Functional transfers   [x] IADLs         [x] Safety Awareness   [x]Endurance    [] Fine Coordination              [x] Balance      [] Vision/perception   []Sensation     []Gross Motor Coordination  [] ROM  [] Delirium                   [] Motor Control     OT PLAN OF CARE   OT POC based on physician orders, patient diagnosis and results of clinical assessment    Frequency/Duration 2-5 days/wk for 2-4 weeks PRN     Specific OT Treatment Interventions to include:   * Instruction/training on adapted ADL techniques and AE recommendations to increase functional independence within precautions       * Training on energy conservation strategies, correct breathing pattern and techniques to improve independence/tolerance for self-care routine  * Functional transfer/mobility training/DME recommendations for increased independence, safety, and fall prevention  * Patient/Family education to increase follow through with safety techniques and functional independence  * Recommendation of environmental modifications for increased safety with functional transfers/mobility and ADLs  * Therapeutic exercise to improve motor endurance, ROM, and functional strength for ADLs/functional transfers  * Therapeutic activities to 
PROGRESS NOTE  By Dale Padron M.D.    [facility]  Dr. Angelica Beavers M.D.,  Dr. Easton Miller M.D.,   Dr. Jono Hyatt D.O.,  Dr. Ken Ramos M.D.,  Dr. Herson Moore D.O.,          Elisa Toth  69 y.o.  female    SUBJECTIVE:  Patient continues to complain of feeling short of breath including with exertion and with conversation.  Patient also reports various complaints of muscle \"tightening\" in her back when she walks.  Patient takes Ativan at bedtime to help her sleep but does not take benzodiazepines throughout the day.  Patient reports that she has a feeling that she cannot take a deep breath.  At current time patient is agreeable to HIDA scan.  When directly asked in regards to colonoscopy, she states that she wants to see what the HIDA scan shows.  Sister at bedside    OBJECTIVE:    BP (!) 122/59   Pulse 70   Temp 97.7 °F (36.5 °C) (Oral)   Resp 16   Ht 1.575 m (5' 2\")   Wt 42.3 kg (93 lb 4.1 oz)   SpO2 99%   BMI 17.06 kg/m²     General: NAD/adult  female.  HEENT: Anicteric sclera/moist oral mucosa  Neck: Supple with trachea midline  Chest: Equal excursion/nonlabored respirations  Cor: Regular  Abd.: Soft/nondistended  Extr.:  No peripheral edema.  Decreased muscle tone and bulk throughout  Skin: Warm and dry/anicteric        DATA:    Monitor data reviewed -leads off       Lab Results   Component Value Date/Time    WBC 5.3 04/11/2024 02:05 AM    RBC 3.83 04/11/2024 02:05 AM    HGB 11.9 04/11/2024 02:05 AM    HCT 34.9 04/11/2024 02:05 AM    MCV 91.1 04/11/2024 02:05 AM    MCH 31.1 04/11/2024 02:05 AM    MCHC 34.1 04/11/2024 02:05 AM    RDW 13.7 04/11/2024 02:05 AM     04/11/2024 02:05 AM    MPV 10.6 04/11/2024 02:05 AM     Lab Results   Component Value Date/Time     04/11/2024 02:05 AM    K 3.5 04/11/2024 02:05 AM     04/11/2024 02:05 AM    CO2 23 04/11/2024 02:05 AM    BUN 16 04/11/2024 02:05 AM    CREATININE 0.7 04/11/2024 02:05 AM    CALCIUM 8.7 
PROGRESS NOTE  By FELICIA Maurice    [facility]  Dr. Angelica Beavers M.D.,  Dr. Easton Miller M.D.,   DEVI Grace.HONG.,  Dr. Ken Ramos M.D.,  Dr. Herson Moore D.O.,          Elisa Toth  69 y.o.  female    SUBJECTIVE:  Patient resting in bed.  Reports that she is still short of breath.  She denies abdominal pain currently.    OBJECTIVE:    /67   Pulse 69   Temp 98.1 °F (36.7 °C) (Oral)   Resp 12   Ht 1.575 m (5' 2\")   Wt 45.6 kg (100 lb 9.6 oz)   SpO2 100%   BMI 18.40 kg/m²     General: NAD/adult  female.  Appears thin.   HEENT: EOMI/anicteric sclera/moist oral mucosa  Neck: Supple with trachea midline  Chest: Symmetric excursion/nonlabored respirations/CTAB  Cor: Regular  Abd.: Soft and not distended  Extr.:  No peripheral edema.  Decreased muscle tone and bulk throughout, consistent with age and condition  Skin: Warm and dry/anicteric      DATA:    Monitor data reviewed -sinus rhythm noted.       Lab Results   Component Value Date/Time    WBC 4.7 04/13/2024 04:22 AM    RBC 4.00 04/13/2024 04:22 AM    HGB 12.3 04/13/2024 04:22 AM    HCT 36.3 04/13/2024 04:22 AM    MCV 90.8 04/13/2024 04:22 AM    MCH 30.8 04/13/2024 04:22 AM    MCHC 33.9 04/13/2024 04:22 AM    RDW 13.5 04/13/2024 04:22 AM     04/13/2024 04:22 AM    MPV 10.0 04/13/2024 04:22 AM     Lab Results   Component Value Date/Time     04/13/2024 04:22 AM    K 3.7 04/13/2024 04:22 AM     04/13/2024 04:22 AM    CO2 25 04/13/2024 04:22 AM    BUN 5 04/13/2024 04:22 AM    CREATININE 0.6 04/13/2024 04:22 AM    CALCIUM 8.7 04/13/2024 04:22 AM    PROT 5.5 04/13/2024 04:22 AM    LABALBU 3.8 04/13/2024 04:22 AM    BILITOT 0.4 04/13/2024 04:22 AM    ALKPHOS 55 04/13/2024 04:22 AM    AST 15 04/13/2024 04:22 AM    ALT 12 04/13/2024 04:22 AM     Lab Results   Component Value Date/Time    LIPASE 19 04/09/2024 05:07 PM     No results found for: \"AMYLASE\"      ASSESSMENT/PLAN:  Patient Active Problem List 
Patient seems better today.  
Physical Therapy  Facility/Department: [address] MED SURG/TELE  Treatment Note  Name: Elisa Toth  : 1955  MRN: [id number]  Date of Service: 2024    Attending Provider:  Bonnie Alarcon MD    Evaluating PT:  Colt Archuleta Jr., P.T.    Room #:  0525/0525-A  Diagnosis:  Chest pain [R07.9], SOB  PMH: Anxiety, detached retina on R  Precautions:  falls    SUBJECTIVE:    Pt lives with her  in a 1 story home with 2 stairs and no rail to enter.  Pt ambulated with no AD PTA.    OBJECTIVE:   Initial Evaluation  Date: 4/10/24 Treatment Short Term/ Long Term   Goals   Was pt agreeable to Eval/treatment? yes yes    Does pt have pain? No c/o pain C/o intermittent spasm L side ribs    Bed Mobility  Rolling: Independent  Supine to sit: Independent  Sit to supine: Independent  Scooting: Independent Rolling: Independent  Supine to sit: NA  Sit to supine: Independent  Scooting: Independent Independent   Transfers Sit to stand: supervision  Stand to sit: supervision  Stand pivot: SBA Sit to stand: Independent  Stand to sit: Independent  Stand pivot: Independent Independent    Ambulation   250 feet with no AD SBA 25+150 feet with no AD supervision 350 feet with no AD Independent    Stair negotiation: ascended and descended NA NA 2 steps with no rail Independent    AM-PAC 6 Clicks       BLE ROM is WFL.   BLE strength is grossly 4/5 to 4+/5.   Sensation:  Pt denies numbness and tingling to extremities  Balance: sitting is Independent and standing with no AD is Independent   Endurance: fair    Vitals:  Pt was found walking out of the BR with her  with her.  She walked to the far side of her bed and c/o spasm L side ribs and asked to sit down on EOB.  After sitting EOB for 20-30s she reported spasm was better and she was able to walk in the ramírez.  After getting about 75 feet RN came to pt and stated she was tachycardic and should stop amb.  RN reported pt's HR was 110's.  Just standing in the ramírez HR 
Pt arrived via EMS from [place], EMS reports pt repeatedly making comments of \" I should have just stayed home and ' but denies being suicidal or homicidal at this time. Dr. Barrett notified of above via [employer].     Electronically signed by Ele Ashton RN on 2024 at 12:35 PM    
Pt to remain NPO for US gallbladder, patient and RN updated.     Electronically signed by Ele Ashton RN on 4/9/2024 at 4:52 PM    
RT instructed patient for daily NIF and Vital Capacity. Patient is very anxious and was negative about her results before trying. Very poor effort on the NIF which was recorded at -20 after 3 attempts. Vital Capacity was improved at 380ml but still not the greatest effort.   
SPIRITUAL HEALTH SERVICES - [facility] Encounter    Name: Elisa Toth                  Referral: Routine Visit    Sacraments  Anointed (Last Rites): Yes  Apostolic [place]: No  Confession: No  Communion: Yes     Assessment:  Patient receptive to  visit.      Intervention:   provided spiritual support and sacramental ministry for patient.     Outcome:  Patient expressed gratitude for visit.    Plan:  Chaplains will remain available to offer spiritual and emotional support as needed.      Electronically signed by Chaplain Imer, on 4/12/2024 at 3:43 PM.  Spiritual Care Department  [facility]  [phone]   
SPIRITUAL HEALTH SERVICES - [facility]  PROGRESS NOTE    Name: Elisa Toth                Hinduism: Spiritism   Anointed (Last Rites): No    Referral: Spiritual Care Consult    Assessment:  Upon entering the room  observes Patient lying in bed ans engaged in conversation with her sister. The patient expresses concern and a=being anxious about her health issue.      Intervention:  Attentive listening, validating feelings where appropriate, words of encouragement support, and reassurance as well open ended questions to assist in nurturing hope.    Outcome:  Less anxious, but still concerned with not knowing the reason for how she feels at the moment. The patient is appreciative of the visit.    Plan:  Chaplains will remain available to offer spiritual and emotional support as needed.      Electronically signed by Chaplain Xuan, on 4/10/2024 at 3:11 PM.  Spiritual Care Department  [facility]  [phone]    
Spoke with stress lab regarding test, asked to reach out to Dr. Barrett regarding dobutamine order, request to change to lexiscan. Will discuss with patient and notify if not agreeable as has refused in the past.     Electronically signed by Ele Ashton RN on 4/10/2024 at 7:37 AM    
Verified with GI pt can eat, order placed   
Abdomen is soft. There is no mass.      Tenderness: There is no abdominal tenderness. There is no guarding.   Musculoskeletal:      Right lower leg: No edema.      Left lower leg: No edema.   Skin:     General: Skin is warm and dry.   Neurological:      General: No focal deficit present.      Mental Status: She is alert and oriented to person, place, and time.   Psychiatric:         Mood and Affect: Mood is anxious.     Labs:  Recent Results (from the past 24 hour(s))   Troponin    Collection Time: 04/13/24 11:12 AM   Result Value Ref Range    Troponin, High Sensitivity 9 0 - 9 ng/L   D-Dimer, Quantitative    Collection Time: 04/13/24 11:12 AM   Result Value Ref Range    D-Dimer, Quant <200 0 - 232 ng/mL DDU   Comprehensive Metabolic Panel w/ Reflex to MG    Collection Time: 04/14/24  7:18 AM   Result Value Ref Range    Sodium 139 132 - 146 mmol/L    Potassium 4.0 3.5 - 5.0 mmol/L    Chloride 108 (H) 98 - 107 mmol/L    CO2 27 22 - 29 mmol/L    Anion Gap 4 (L) 7 - 16 mmol/L    Glucose 98 74 - 99 mg/dL    BUN 6 6 - 23 mg/dL    Creatinine 0.7 0.50 - 1.00 mg/dL    Est, Glom Filt Rate >90 >60 mL/min/1.73m2    Calcium 8.9 8.6 - 10.2 mg/dL    Total Protein 5.6 (L) 6.4 - 8.3 g/dL    Albumin 3.8 3.5 - 5.2 g/dL    Total Bilirubin 0.3 0.0 - 1.2 mg/dL    Alkaline Phosphatase 56 35 - 104 U/L    ALT 15 0 - 32 U/L    AST 18 0 - 31 U/L   CBC with Auto Differential    Collection Time: 04/14/24  7:18 AM   Result Value Ref Range    WBC 5.8 4.5 - 11.5 k/uL    RBC 4.04 3.50 - 5.50 m/uL    Hemoglobin 12.3 11.5 - 15.5 g/dL    Hematocrit 37.1 34.0 - 48.0 %    MCV 91.8 80.0 - 99.9 fL    MCH 30.4 26.0 - 35.0 pg    MCHC 33.2 32.0 - 34.5 g/dL    RDW 13.6 11.5 - 15.0 %    Platelets 238 130 - 450 k/uL    MPV 10.2 7.0 - 12.0 fL    Neutrophils % 71 43.0 - 80.0 %    Lymphocytes % 14 (L) 20.0 - 42.0 %    Monocytes % 9 2.0 - 12.0 %    Eosinophils % 5 0 - 6 %    Basophils % 1 0.0 - 2.0 %    Immature Granulocytes % 0 0.0 - 5.0 %    Neutrophils Absolute 
Generalized abdominal pain    Other mixed anxiety disorders    Portal vein thrombosis    Intractable abdominal pain    Unable to ambulate    Left upper quadrant abdominal pain    Superior mesenteric vein thrombosis (HCC)    Brief psychotic disorder (HCC)    Mood disorder (HCC)    Severe protein-calorie malnutrition (HCC)    Acute chest pain     1.  Abdominal pain/nausea/weight loss  -Fairly unremarkable upper endoscopy February 2024  -History of portal vein hrombosis  -Dilated hepatic duct with questionable liver hypodensities  -Patient continues to refuse  MRI  -CT scan/10/2024  -Stress test yesterday negative for inducible ischemia  -Should consider also nonemergent colonoscopy -see below  -Dr. Nagi Cohen input appreciated  -HIDA scan with decreased gallbladder ejection fraction     2.  Dyspnea  -Defer to admitting      3.  Psychiatric disorder NOS  -History of psychiatric admission -review of EMR indicates restricted access to psychiatric admission notes  -Should consider psychiatry evaluation    As above described recommend nonemergent colonoscopy which can be performed as outpatient    Dale Padron MD  4/12/2024  2:52 PM    NOTE:  This report was transcribed using voice recognition software.  Every effort was made to ensure accuracy; however, inadvertent computerized transcription errors may be present.  
patient.    Pt's/ family goals   1. To go home.     Patient and or family understand(s) diagnosis, prognosis, and plan of care.    PHYSICAL THERAPY PLAN OF CARE:    PT POC is established based on physician order and patient diagnosis     Referring provider/PT Order:  PT eval and treat  Diagnosis:  Chest pain [R07.9]  Specific instructions for next treatment:  amb and attempt stairs.    Current Treatment Recommendations:     [x] Strengthening to improve independence with functional mobility   [] ROM to improve ROM and decrease spasm and pain which will help promote independence with functional mobility   [x] Balance Training to improve static/dynamic balance and to reduce fall risk  [x] Endurance Training to improve activity tolerance during functional mobility   [x] Transfer Training to improve safety and independence with all functional transfers   [x] Gait Training to improve gait mechanics, endurance and assess need for appropriate assistive device  [x] Stair Training in preparation for safe discharge home and/or into the community   [] Positioning to prevent skin breakdown and contractures  [] Safety and Education Training   [x] Patient/Caregiver Education   [] HEP  [] Other     PT long term treatment goals are located in above grid    Frequency of treatments: 2-5x/week x 1-2 weeks.    Time in  13:10  Time out  13:25     Evaluation Time includes thorough review of current medical information, gathering information on past medical history/social history and prior level of function, completion of standardized testing/informal observation of tasks, assessment of data and education on plan of care and goals.    CPT codes:  [x] Low Complexity PT evaluation 97354  [] Moderate Complexity PT evaluation 88800  [] High Complexity PT evaluation 54549  [] PT Re-evaluation 09526  [] Gait training 18492 ** minutes  [] Manual therapy 93356 ** minutes  [] Therapeutic activities 64954 ** minutes  [] Therapeutic exercises 84759 
coming in due to the chest pain.  -At this time patient does not need involuntary hold or psychiatric consult, however, we will further chart dive and discussed with patient on her psychiatric medication history and psychiatric medication prescribers.        Pain Control:  Tylenol 650 mg Q6HR PRN for pain  DVT ppx: Lovenox  GI ppx: Protonix 40 mg daily  Code Status: Full  Diet: NPO      Disposition: Discharge to Home pending clinical course     Abilio Barrett MD   Family Medicine Resident PGY-1  04/10/24   6:52 AM

## 2024-04-15 ENCOUNTER — CARE COORDINATION (OUTPATIENT)
Dept: CARE COORDINATION | Age: 69
End: 2024-04-15

## 2024-04-15 LAB
EKG ATRIAL RATE: 103 BPM
EKG P AXIS: 76 DEGREES
EKG P-R INTERVAL: 118 MS
EKG Q-T INTERVAL: 336 MS
EKG QRS DURATION: 80 MS
EKG QTC CALCULATION (BAZETT): 440 MS
EKG R AXIS: 74 DEGREES
EKG T AXIS: 58 DEGREES
EKG VENTRICULAR RATE: 103 BPM

## 2024-04-15 PROCEDURE — 93010 ELECTROCARDIOGRAM REPORT: CPT | Performed by: INTERNAL MEDICINE

## 2024-04-15 NOTE — CARE COORDINATION
Care Transitions Initial Follow Up Call    Call within 2 business days of discharge: Yes    Care Transition Nurse attempted initial CT outreach leaving HIPAA VM, purpose of call, my contact and reminder to schedule appt.     Patient: Elisa Toth Patient : 1955   MRN: [id number]  Reason for Admission: 2024 - 2024 [facility] IP. Abd pain.  Discharge Date: 24 RARS: Readmission Risk Score: 15.4  NR (last admit was Obs)  CT    SOPHIA front pool routed to schedule 1 wk hosp fu PCP.    Follow up with Nagi Cohen III, MD (General Surgery) in 1 day (4/15/2024); for follow up and to schedule your gallbladder removal due to a nonfunctioning gallbladder  Schedule an appointment with Carrie Neal DO (Family Medicine); Hospital Follow Up  Schedule an appointment with Jono Hyatt DO (Gastroenterology)  Schedule an appointment with Delfina Garcia MD (Cardiology)    START taking:  docusate (COLACE, DULCOLAX)  Start taking on: April 15, 2024  famotidine (PEPCID)  metoprolol succinate (TOPROL XL)  Start taking on: April 15, 2024  polyethylene glycol (GLYCOLAX)  Start taking on: April 15, 2024  sertraline (ZOLOFT)  CHANGE how you take:  LORazepam (ATIVAN)  STOP taking:  hyoscyamine 125 MCG Tbdp dispersible  tablet (OSCIMIN)    Last Discharge Facility       Date Complaint Diagnosis Description Type Department Provider    24  Chest pain, unspecified type ... Admission (Discharged) Giovanna Garcia MD    24 Allergic Reaction Dyspnea and respiratory abnormalities ... ED (TRANSFER) [facility] Mihaela Milian MD Sherry Pine, RN

## 2024-04-15 NOTE — DISCHARGE SUMMARY
Physician Discharge Summary  [facility] Family Medicine Residency     Patient ID:  Elisa Toth  [id number]  69 y.o.  1955    Admit date: 4/9/2024    Discharge date: 4/14/2024     Admission Diagnoses:   Chest pain [R07.9]    Discharge Diagnoses:  Principal Problem (Resolved):    Generalized abdominal pain  Active Problems:    Anxiety    Mood disorder (HCC)    Severe protein-calorie malnutrition (HCC)    Dyspnea    Biliary dyskinesia    Schatzki's ring    Supraventricular tachycardia (HCC)  Resolved Problems:    Atypical chest pain    Tachycardia    PAT (paroxysmal atrial tachycardia) ([facility])      Consults: cardiology, pulmonary, and GI  Procedures: None    Hospital Course: 69 year old female who presented for chest pain and shortness of breath and was admitted for New onset atypical chest pain and shortness of breath.     Patient has remarkable PMHx of anxiety, depression, GERD, IBS . Patient was admited to the Family medicine team was put on telemetry, order troponin, D-dimer and BNP which were unremarkable as well as nuclear stress test with myocardial perfusion which showed normal left ventricular perfusion as well as no evidence of inducible ischemia. On a clinical basis patient continue to complain of dyspnea disproportionate to her objective picture and with no evidence of significant cardiovascular origins.  On 04/13 patient mentioned still having some shortness of breath and had sinus tachycardia around 160 bpm. EKG, troponins, D-dimer and ABG were ordered at that time and unremarkable. Pulmonology and cardiology were consulted in view of persistent symptoms despite unchanged baseline, patient required 2 L nasal canunula for comfort with Sat 02 100% and remained stable.  After pulmonology and cardiology evaluation patient was put on Toprol 10.5 mg and did no required further workup, ruling out any evident cardiopulmonary source for her symptoms most likely psychogenic in nature.     GI was

## 2024-04-16 ENCOUNTER — OFFICE VISIT (OUTPATIENT)
Dept: FAMILY MEDICINE CLINIC | Age: 69
End: 2024-04-16

## 2024-04-16 ENCOUNTER — CARE COORDINATION (OUTPATIENT)
Dept: CARE COORDINATION | Age: 69
End: 2024-04-16

## 2024-04-16 VITALS
TEMPERATURE: 97.4 F | WEIGHT: 98.8 LBS | OXYGEN SATURATION: 98 % | DIASTOLIC BLOOD PRESSURE: 72 MMHG | HEART RATE: 110 BPM | RESPIRATION RATE: 16 BRPM | BODY MASS INDEX: 18.18 KG/M2 | SYSTOLIC BLOOD PRESSURE: 130 MMHG | HEIGHT: 62 IN

## 2024-04-16 DIAGNOSIS — Z12.31 ENCOUNTER FOR SCREENING MAMMOGRAM FOR MALIGNANT NEOPLASM OF BREAST: ICD-10-CM

## 2024-04-16 DIAGNOSIS — K55.069 ACUTE INFARCTION OF INTESTINE, PART AND EXTENT UNSPECIFIED (HCC): ICD-10-CM

## 2024-04-16 DIAGNOSIS — F23 BRIEF PSYCHOTIC DISORDER (HCC): ICD-10-CM

## 2024-04-16 DIAGNOSIS — Z09 HOSPITAL DISCHARGE FOLLOW-UP: Primary | ICD-10-CM

## 2024-04-16 NOTE — CARE COORDINATION
ANTICOAGULATION NOTE    Warfarin Indication R CFV DVT (7/15/22)   Significant Medical History Hx of stroke, probable hx of a VTE in the past, paraplegia, CAD, syncope, s/p CABG   Goal INR 2.0-3.0   Date Warfarin Initiated Most recently on 8/1/22   Warfarin Duration Indefinite?   Primary Clinic Provider Chayo Joya DO   Anticoagulation Provider Annel Coelho, PharmD/Rory Barnes PharmD      SUBJECTIVE:      CHANGES IN     Changes   Medication regimen Switched to Levemir 8 units nightly from Lantus 10 units nightly on 4/8/23 - no interaction with warfarin   Diet [Anna keeps minimum vit K veggies in his diet; denies using grapefruit, cranberry, greentea, alcohol, or protein shakes] None   Medical complaints None   Activity None   Other None         CHECK IF APPLICABLE  [  ] blood in urine/stool  [  ] nose/gums bleed  [  ] unusual bruising, petechiae, ecchymosis  [  ] incorrect dose   [ x ] EtOH intake: [x] wife denied current use  [  ] Smoking: [ ] cigarette [ ] marijuana [x] none     HERBALS/OTC's:    ASA 81 mg po daily  Centrum MVI po daily  Vit D 2000 units po daily  Miralax 17 g po PRN        OBJECTIVE  1. Labs drawn at: Home draw      Warfarin tablet strength: 5 mg (peach); wife organizes pills in a pill container and uses a pill cuter to cut tablets in 1/2      Takes what time of the day: 6-6:30PM  -------------------------------------------------------------------------------------------------------------------  VISIT DATE CURRENT WARFARIN DOSE INR NEW WARFARIN DOSE   10/20/22 2.5 mg daily exc 5 mg TuThSa 2.5 2.5 mg daily exc 5 mg TuThSa   11/17/22 2.5 mg daily exc 5 mg TuThSa 2.6 2.5 mg daily exc 5 mg TuThSa   12/19/22 2.5 mg daily exc 5 mg TuThSa 1.8 (12/15) 5 mg x1, 2.5 mg daily exc 5 mg TuThSa   12/30/22 2.5 mg daily exc 5 mg TuThSa 1.7 5 mg x 1 (12/30), then 5 mg daily exc 2.5 mg MWF   1/9/23 5 mg daily exc 2.5 mg MWF - omitted 2.5 mg from 1/8 dose (per wife) 2.6 5 mg daily exc 2.5 mg MWF   1/22/23  Care Transitions Initial Follow Up Call    Call within 2 business days of discharge: Yes    Care Transition Nurse attempted second initial CT outreach and pt requests call back tomorrow.      Patient: Elisa Toth Patient : 1955   MRN: [id number]  Reason for Admission: 2024 - 2024 [facility] IP. Abd pain.   Discharge Date: 24 RARS: Readmission Risk Score: 15.4  NR (last admit was Obs)  CT     Follow up with Nagi Cohen III, MD (General Surgery) in 1 day (4/15/2024); for follow up and to schedule your gallbladder removal due to a nonfunctioning gallbladder  Hosp FU Carrie Neal DO (Family Medicine)  8:45.  Schedule an appointment with Jono Hyatt DO (Gastroenterology)  Schedule an appointment with Delfina Garcia MD (Cardiology)     START taking:  docusate (COLACE, DULCOLAX)  Start taking on: April 15, 2024  famotidine (PEPCID)  metoprolol succinate (TOPROL XL)  Start taking on: April 15, 2024  polyethylene glycol (GLYCOLAX)  Start taking on: April 15, 2024  sertraline (ZOLOFT)  CHANGE how you take:  LORazepam (ATIVAN)  STOP taking:  hyoscyamine 125 MCG Tbdp dispersible  tablet (OSCIMIN)    Last Discharge Facility       Date Complaint Diagnosis Description Type Department Provider    24  Chest pain, unspecified type ... Admission (Discharged) Giovanna Garcia MD    24 Allergic Reaction Dyspnea and respiratory abnormalities ... ED (TRANSFER) [facility] Mihaela Milian MD Sherry Pine, RN     5 mg daily exc 2.5 mg MWF 2.3 5 mg daily exc 2.5 mg MWF   2/10/23 5 mg daily exc 2.5 mg MWF 2.6 5 mg daily exc 2.5 mg MWF   3/13/23 5 mg daily exc 2.5 mg MWF 2.8 5 mg daily exc 2.5 mg MWF   4/10/23 5 mg daily exc 2.5 mg MWF 1.9 5 mg daily exc 2.5 mg MWF   5/1/23 5 mg daily exc 2.5 mg MWF 2.2 5 mg daily exc 2.5 mg MWF   5/31/23 5 mg daily exc 2.5 mg MWF 2.8 5 mg daily exc 2.5 mg MWF   6/28/23 5 mg daily exc 2.5 mg MWF 2.3 5 mg daily exc 2.5 mg MWF      -------------------------------------------------------------------------------------------------------------------     2. Warfarin-Interacting OUTPATIENT Meds   Medication Sig Interaction   atorvastatin (LIPITOR) 20 MG tablet TAKE 1 TABLET BY MOUTH DAILY May increase INR   citalopram (CeleXA) 20 MG tablet TAKE 1 TABLET BY MOUTH DAILY May increase risk of bleeding/bruising   ASA 81 mg tab Take 1 tablet by mouth daily May increase risk of bleeding/bruising        3. LABS   INR:   INR (no units)   Date Value   06/28/2023 2.3     HGB:   HGB (g/dL)   Date Value   04/10/2023 10.1 (L)      HCT:   HCT (%)   Date Value   04/10/2023 35.2 (L)     PLT:   PLT (K/mcL)   Date Value   04/10/2023 227       ASSESSMENT/PLAN  1. INR 2.8  [ X ] within the desired range  Comments: Spoke to pt's wife,Anna, regarding INR result, dose and next f/u. Informed wife that INR of 2.3 is in goal range of 2-3. Wife reported no signs/symptoms of bleeding or bruising and had no changes in medication/diet/health. She reports having an appointment tomorrow with Dr. Joya to discuss results from iron studies.  She verbalized understanding of the plan below.     2. Warfarin dose instructions:    [ X ] continue current dose - 5 mg daily exc 2.5 mg MWF     3. Labs ordered:  Home Draw  [ X ]PT/INR     4. Date of next visit:  7/26/23      Rory Barnes, Pharm.D., [facility]  [phone]

## 2024-04-16 NOTE — PROGRESS NOTES
deformity or tenderness  Neurologic: reflexes normal and symmetric, no cranial nerve deficit, gait, coordination and speech normal      An electronic signature was used to authenticate this note.  --Carrie Dumont, DO

## 2024-04-17 ENCOUNTER — TELEPHONE (OUTPATIENT)
Dept: ADMINISTRATIVE | Age: 69
End: 2024-04-17

## 2024-04-17 ENCOUNTER — TELEPHONE (OUTPATIENT)
Dept: CARDIOLOGY CLINIC | Age: 69
End: 2024-04-17

## 2024-04-17 ENCOUNTER — CARE COORDINATION (OUTPATIENT)
Dept: CARE COORDINATION | Age: 69
End: 2024-04-17

## 2024-04-17 NOTE — CARE COORDINATION
Care Transitions Initial Follow Up Call    Call within 2 business days of discharge: Yes    Care Transition Nurse attempted third initial CT outreach leaving HIPAA VM, purpose of call, my contact. Noting pt did attend appt yesterday. CTN s/o.     Patient: Elisa Toth Patient : 1955   MRN: [id number]  Reason for Admission: 2024 - 2024 [facility] IP. Abd pain.   Discharge Date: 24 RARS: Readmission Risk Score: 15.4  NR (last admit was Obs)  CT     Follow up with Nagi Cohen III, MD (General Surgery) in 1 day (4/15/2024); for follow up and to schedule your gallbladder removal due to a nonfunctioning gallbladder  Hosp FU Carrie Neal DO (Family Medicine)  8:45. Attended.  Schedule an appointment with Jono Hyatt DO (Gastroenterology)  Schedule an appointment with Delfina Garcia MD (Cardiology)     START taking:  docusate (COLACE, DULCOLAX)  Start taking on: April 15, 2024  famotidine (PEPCID)  metoprolol succinate (TOPROL XL)  Start taking on: April 15, 2024  polyethylene glycol (GLYCOLAX)  Start taking on: April 15, 2024  sertraline (ZOLOFT)  CHANGE how you take:  LORazepam (ATIVAN)  STOP taking:  hyoscyamine 125 MCG Tbdp dispersible  tablet (OSCIMIN)    Last Discharge Facility       Date Complaint Diagnosis Description Type Department Provider    24  Chest pain, unspecified type ... Admission (Discharged) Giovanna Garcia MD    24 Allergic Reaction Dyspnea and respiratory abnormalities ... ED (TRANSFER) [facility] Mihaela Milian MD Sherry Pine, RN

## 2024-04-17 NOTE — TELEPHONE ENCOUNTER
----- Message from Kyaw Montana MD sent at 4/14/2024  9:02 AM EDT -----  This is a patient of [facility] hospitalized with multiple predominately noncardiac issues and arrhythmia monitoring findings of paroxysmal supraventricular tachycardia.  She will need follow-up with him within approximately the next month

## 2024-04-18 LAB — EBV VCA IGG SER-ACNC: 904 U/ML

## 2024-04-20 ENCOUNTER — APPOINTMENT (OUTPATIENT)
Dept: GENERAL RADIOLOGY | Age: 69
End: 2024-04-20
Payer: MEDICARE

## 2024-04-20 ENCOUNTER — HOSPITAL ENCOUNTER (EMERGENCY)
Age: 69
Discharge: HOME | End: 2024-04-20
Attending: EMERGENCY MEDICINE
Payer: MEDICARE

## 2024-04-20 VITALS
TEMPERATURE: 98.4 F | BODY MASS INDEX: 18.11 KG/M2 | RESPIRATION RATE: 16 BRPM | OXYGEN SATURATION: 100 % | HEART RATE: 97 BPM | DIASTOLIC BLOOD PRESSURE: 72 MMHG | HEIGHT: 62 IN | WEIGHT: 98.4 LBS | SYSTOLIC BLOOD PRESSURE: 137 MMHG

## 2024-04-20 DIAGNOSIS — F41.1 ANXIETY STATE: ICD-10-CM

## 2024-04-20 DIAGNOSIS — R06.00 DYSPNEA, UNSPECIFIED TYPE: Primary | ICD-10-CM

## 2024-04-20 LAB
ALBUMIN SERPL-MCNC: 4.7 G/DL (ref 3.5–5.2)
ALP SERPL-CCNC: 68 U/L (ref 35–104)
ALT SERPL-CCNC: 21 U/L (ref 0–32)
ANION GAP SERPL CALCULATED.3IONS-SCNC: 16 MMOL/L (ref 7–16)
AST SERPL-CCNC: 24 U/L (ref 0–31)
BASOPHILS # BLD: 0.04 K/UL (ref 0–0.2)
BASOPHILS NFR BLD: 1 % (ref 0–2)
BILIRUB SERPL-MCNC: 0.5 MG/DL (ref 0–1.2)
BNP SERPL-MCNC: <26 PG/ML (ref 0–125)
BUN SERPL-MCNC: 25 MG/DL (ref 6–23)
CALCIUM SERPL-MCNC: 9.9 MG/DL (ref 8.6–10.2)
CHLORIDE SERPL-SCNC: 100 MMOL/L (ref 98–107)
CO2 SERPL-SCNC: 24 MMOL/L (ref 22–29)
CREAT SERPL-MCNC: 0.8 MG/DL (ref 0.5–1)
EOSINOPHIL # BLD: 0.02 K/UL (ref 0.05–0.5)
EOSINOPHILS RELATIVE PERCENT: 0 % (ref 0–6)
ERYTHROCYTE [DISTWIDTH] IN BLOOD BY AUTOMATED COUNT: 13.7 % (ref 11.5–15)
GFR SERPL CREATININE-BSD FRML MDRD: 87 ML/MIN/1.73M2
GLUCOSE SERPL-MCNC: 111 MG/DL (ref 74–99)
HCT VFR BLD AUTO: 40.9 % (ref 34–48)
HGB BLD-MCNC: 13.9 G/DL (ref 11.5–15.5)
IMM GRANULOCYTES # BLD AUTO: <0.03 K/UL (ref 0–0.58)
IMM GRANULOCYTES NFR BLD: 0 % (ref 0–5)
LIPASE SERPL-CCNC: 21 U/L (ref 13–60)
LYMPHOCYTES NFR BLD: 0.93 K/UL (ref 1.5–4)
LYMPHOCYTES RELATIVE PERCENT: 15 % (ref 20–42)
MCH RBC QN AUTO: 30.5 PG (ref 26–35)
MCHC RBC AUTO-ENTMCNC: 34 G/DL (ref 32–34.5)
MCV RBC AUTO: 89.9 FL (ref 80–99.9)
MONOCYTES NFR BLD: 0.45 K/UL (ref 0.1–0.95)
MONOCYTES NFR BLD: 7 % (ref 2–12)
NEUTROPHILS NFR BLD: 76 % (ref 43–80)
NEUTS SEG NFR BLD: 4.63 K/UL (ref 1.8–7.3)
PLATELET # BLD AUTO: 207 K/UL (ref 130–450)
PMV BLD AUTO: 11.6 FL (ref 7–12)
POTASSIUM SERPL-SCNC: 4.4 MMOL/L (ref 3.5–5)
PROT SERPL-MCNC: 7.1 G/DL (ref 6.4–8.3)
RBC # BLD AUTO: 4.55 M/UL (ref 3.5–5.5)
SODIUM SERPL-SCNC: 140 MMOL/L (ref 132–146)
TROPONIN I SERPL HS-MCNC: 9 NG/L (ref 0–9)
WBC OTHER # BLD: 6.1 K/UL (ref 4.5–11.5)

## 2024-04-20 PROCEDURE — 71045 X-RAY EXAM CHEST 1 VIEW: CPT

## 2024-04-20 PROCEDURE — 93005 ELECTROCARDIOGRAM TRACING: CPT | Performed by: EMERGENCY MEDICINE

## 2024-04-20 PROCEDURE — 85025 COMPLETE CBC W/AUTO DIFF WBC: CPT

## 2024-04-20 PROCEDURE — 99285 EMERGENCY DEPT VISIT HI MDM: CPT

## 2024-04-20 PROCEDURE — 80053 COMPREHEN METABOLIC PANEL: CPT

## 2024-04-20 PROCEDURE — 84484 ASSAY OF TROPONIN QUANT: CPT

## 2024-04-20 PROCEDURE — 83880 ASSAY OF NATRIURETIC PEPTIDE: CPT

## 2024-04-20 PROCEDURE — 83690 ASSAY OF LIPASE: CPT

## 2024-04-20 ASSESSMENT — PAIN DESCRIPTION - ORIENTATION: ORIENTATION: LEFT;LOWER

## 2024-04-20 ASSESSMENT — PAIN - FUNCTIONAL ASSESSMENT
PAIN_FUNCTIONAL_ASSESSMENT: PREVENTS OR INTERFERES WITH ALL ACTIVE AND SOME PASSIVE ACTIVITIES
PAIN_FUNCTIONAL_ASSESSMENT: 0-10

## 2024-04-20 ASSESSMENT — PAIN SCALES - GENERAL: PAINLEVEL_OUTOF10: 10

## 2024-04-20 ASSESSMENT — PAIN DESCRIPTION - DESCRIPTORS: DESCRIPTORS: TIGHTNESS

## 2024-04-20 ASSESSMENT — ENCOUNTER SYMPTOMS
ABDOMINAL PAIN: 0
COUGH: 0
SHORTNESS OF BREATH: 1
BACK PAIN: 0

## 2024-04-20 ASSESSMENT — PAIN DESCRIPTION - ONSET: ONSET: ON-GOING

## 2024-04-20 ASSESSMENT — PAIN DESCRIPTION - FREQUENCY: FREQUENCY: INTERMITTENT

## 2024-04-20 ASSESSMENT — PAIN DESCRIPTION - LOCATION: LOCATION: CHEST

## 2024-04-20 ASSESSMENT — PAIN DESCRIPTION - PAIN TYPE: TYPE: CHRONIC PAIN;ACUTE PAIN

## 2024-04-20 NOTE — ED PROVIDER NOTES
Breath sounds: No wheezing or rhonchi.   Abdominal:      General: There is no distension.      Palpations: Abdomen is soft.   Musculoskeletal:      Cervical back: Normal range of motion and neck supple.   Skin:     General: Skin is warm and dry.   Neurological:      General: No focal deficit present.      Mental Status: She is alert and oriented to person, place, and time.      Cranial Nerves: No cranial nerve deficit.   Psychiatric:      Comments: Anxious          Procedures     MDM  Number of Diagnoses or Management Options  Anxiety state  Dyspnea, unspecified type  Diagnosis management comments: This is a 69-year-old female who presented to the ED for evaluation of anxiety as well as some shortness of breath.  Patient has been admitted twice in the past several months for the same concerning complaint.  Patient states that today she got anxious which developed her to have some shortness of she took Ativan which she has been prescribed states she got more anxious and more short of breath.  Patient denied any SI or HI.  Chest x-ray did show hyperinflated lung however no signs of pneumothorax or infiltrate.  EKG and blood work were unrevealing.  After recheck patient was quite calm vital signs were stable did recommend holding off on any Ativan going forward she is to follow-up with her PCP on Monday she was given return precaution                 ED Course as of 04/20/24 1624   Sat Apr 20, 2024   1506 EKG:  This EKG is signed and interpreted by me.    Rate: 82  Rhythm: Sinus  Interpretation: NSR, Rightward Axis, no st elevation, no st depressions  Comparison: was normal  [BP]      ED Course User Index  [BP] Chuck Bernard DO             ED Course as of 04/20/24 1624   Sat Apr 20, 2024   1506 EKG:  This EKG is signed and interpreted by me.    Rate: 82  Rhythm: Sinus  Interpretation: NSR, Rightward Axis, no st elevation, no st depressions  Comparison: was normal  [BP]      ED Course User Index  [BP] Joana  DO Chuck       --------------------------------------------- PAST HISTORY ---------------------------------------------  Past Medical History:  has a past medical history of Abnormal Pap smear of cervix, Anxiety, Chest pain, Detached retina, right, and IBS (irritable bowel syndrome).    Past Surgical History:  has a past surgical history that includes Tonsillectomy and adenoidectomy; Cataract removal (07/17/11); and LEEP.    Social History:  reports that she has never smoked. She has never used smokeless tobacco. She reports that she does not drink alcohol and does not use drugs.    Family History: family history includes Cancer in her maternal uncle and maternal uncle; Diabetes in her maternal grandmother; High Blood Pressure in her maternal grandmother; High Cholesterol in her mother.     The patient’s home medications have been reviewed.    Allergies: Xanax xr [alprazolam er], Codeine, and Dilaudid [hydromorphone hcl]    -------------------------------------------------- RESULTS -------------------------------------------------  Labs:  Results for orders placed or performed during the hospital encounter of 04/20/24   CMP   Result Value Ref Range    Sodium 140 132 - 146 mmol/L    Potassium 4.4 3.5 - 5.0 mmol/L    Chloride 100 98 - 107 mmol/L    CO2 24 22 - 29 mmol/L    Anion Gap 16 7 - 16 mmol/L    Glucose 111 (H) 74 - 99 mg/dL    BUN 25 (H) 6 - 23 mg/dL    Creatinine 0.8 0.50 - 1.00 mg/dL    EstAlexis Filt Rate 87 >60 mL/min/1.73m2    Calcium 9.9 8.6 - 10.2 mg/dL    Total Protein 7.1 6.4 - 8.3 g/dL    Albumin 4.7 3.5 - 5.2 g/dL    Total Bilirubin 0.5 0.0 - 1.2 mg/dL    Alkaline Phosphatase 68 35 - 104 U/L    ALT 21 0 - 32 U/L    AST 24 0 - 31 U/L   CBC with Auto Differential   Result Value Ref Range    WBC 6.1 4.5 - 11.5 k/uL    RBC 4.55 3.50 - 5.50 m/uL    Hemoglobin 13.9 11.5 - 15.5 g/dL    Hematocrit 40.9 34.0 - 48.0 %    MCV 89.9 80.0 - 99.9 fL    MCH 30.5 26.0 - 35.0 pg    MCHC 34.0 32.0 - 34.5 g/dL

## 2024-04-21 LAB
EKG ATRIAL RATE: 82 BPM
EKG P AXIS: 71 DEGREES
EKG P-R INTERVAL: 118 MS
EKG Q-T INTERVAL: 380 MS
EKG QRS DURATION: 78 MS
EKG QTC CALCULATION (BAZETT): 443 MS
EKG R AXIS: 94 DEGREES
EKG T AXIS: 56 DEGREES
EKG VENTRICULAR RATE: 82 BPM

## 2024-04-21 PROCEDURE — 93010 ELECTROCARDIOGRAM REPORT: CPT | Performed by: INTERNAL MEDICINE

## 2024-04-24 ENCOUNTER — APPOINTMENT (OUTPATIENT)
Dept: ULTRASOUND IMAGING | Age: 69
DRG: 418 | End: 2024-04-24
Payer: MEDICARE

## 2024-04-24 ENCOUNTER — APPOINTMENT (OUTPATIENT)
Dept: GENERAL RADIOLOGY | Age: 69
DRG: 418 | End: 2024-04-24
Payer: MEDICARE

## 2024-04-24 ENCOUNTER — HOSPITAL ENCOUNTER (INPATIENT)
Age: 69
LOS: 2 days | Discharge: HOME | DRG: 418 | End: 2024-04-29
Attending: EMERGENCY MEDICINE | Admitting: INTERNAL MEDICINE
Payer: MEDICARE

## 2024-04-24 ENCOUNTER — APPOINTMENT (OUTPATIENT)
Dept: CT IMAGING | Age: 69
DRG: 418 | End: 2024-04-24
Payer: MEDICARE

## 2024-04-24 DIAGNOSIS — K55.1 SMAS (SUPERIOR MESENTERIC ARTERY SYNDROME) (HCC): ICD-10-CM

## 2024-04-24 DIAGNOSIS — K82.8 BILIARY DYSKINESIA: ICD-10-CM

## 2024-04-24 DIAGNOSIS — R62.7 FAILURE TO THRIVE IN ADULT: Primary | ICD-10-CM

## 2024-04-24 LAB
ALBUMIN SERPL-MCNC: 4.6 G/DL (ref 3.5–5.2)
ALP SERPL-CCNC: 62 U/L (ref 35–104)
ALT SERPL-CCNC: 16 U/L (ref 0–32)
ANION GAP SERPL CALCULATED.3IONS-SCNC: 16 MMOL/L (ref 7–16)
AST SERPL-CCNC: 18 U/L (ref 0–31)
BASOPHILS # BLD: 0.07 K/UL (ref 0–0.2)
BASOPHILS NFR BLD: 1 % (ref 0–2)
BILIRUB SERPL-MCNC: 0.5 MG/DL (ref 0–1.2)
BILIRUB UR QL STRIP: NEGATIVE
BUN SERPL-MCNC: 23 MG/DL (ref 6–23)
CALCIUM SERPL-MCNC: 9.8 MG/DL (ref 8.6–10.2)
CHLORIDE SERPL-SCNC: 102 MMOL/L (ref 98–107)
CLARITY UR: CLEAR
CO2 SERPL-SCNC: 22 MMOL/L (ref 22–29)
COLOR UR: YELLOW
CREAT SERPL-MCNC: 0.9 MG/DL (ref 0.5–1)
EKG ATRIAL RATE: 98 BPM
EKG P AXIS: 85 DEGREES
EKG P-R INTERVAL: 106 MS
EKG Q-T INTERVAL: 346 MS
EKG QRS DURATION: 74 MS
EKG QTC CALCULATION (BAZETT): 441 MS
EKG R AXIS: 89 DEGREES
EKG T AXIS: 78 DEGREES
EKG VENTRICULAR RATE: 98 BPM
EOSINOPHIL # BLD: 0.04 K/UL (ref 0.05–0.5)
EOSINOPHILS RELATIVE PERCENT: 1 % (ref 0–6)
ERYTHROCYTE [DISTWIDTH] IN BLOOD BY AUTOMATED COUNT: 13.8 % (ref 11.5–15)
GFR SERPL CREATININE-BSD FRML MDRD: 73 ML/MIN/1.73M2
GLUCOSE SERPL-MCNC: 138 MG/DL (ref 74–99)
GLUCOSE UR STRIP-MCNC: NEGATIVE MG/DL
HCT VFR BLD AUTO: 41.5 % (ref 34–48)
HGB BLD-MCNC: 13.8 G/DL (ref 11.5–15.5)
HGB UR QL STRIP.AUTO: NEGATIVE
IMM GRANULOCYTES # BLD AUTO: 0.03 K/UL (ref 0–0.58)
IMM GRANULOCYTES NFR BLD: 0 % (ref 0–5)
KETONES UR STRIP-MCNC: 40 MG/DL
LACTATE BLDV-SCNC: 1.9 MMOL/L (ref 0.5–2.2)
LEUKOCYTE ESTERASE UR QL STRIP: NEGATIVE
LIPASE SERPL-CCNC: 24 U/L (ref 13–60)
LYMPHOCYTES NFR BLD: 1.05 K/UL (ref 1.5–4)
LYMPHOCYTES RELATIVE PERCENT: 14 % (ref 20–42)
MCH RBC QN AUTO: 30.6 PG (ref 26–35)
MCHC RBC AUTO-ENTMCNC: 33.3 G/DL (ref 32–34.5)
MCV RBC AUTO: 92 FL (ref 80–99.9)
MONOCYTES NFR BLD: 0.54 K/UL (ref 0.1–0.95)
MONOCYTES NFR BLD: 7 % (ref 2–12)
NEUTROPHILS NFR BLD: 76 % (ref 43–80)
NEUTS SEG NFR BLD: 5.57 K/UL (ref 1.8–7.3)
NITRITE UR QL STRIP: NEGATIVE
PH UR STRIP: 6 [PH] (ref 5–9)
PLATELET # BLD AUTO: 376 K/UL (ref 130–450)
PMV BLD AUTO: 10.3 FL (ref 7–12)
POTASSIUM SERPL-SCNC: 3.9 MMOL/L (ref 3.5–5)
PROT SERPL-MCNC: 6.7 G/DL (ref 6.4–8.3)
PROT UR STRIP-MCNC: NEGATIVE MG/DL
RBC # BLD AUTO: 4.51 M/UL (ref 3.5–5.5)
RBC #/AREA URNS HPF: ABNORMAL /HPF
SODIUM SERPL-SCNC: 140 MMOL/L (ref 132–146)
SP GR UR STRIP: 1.02 (ref 1–1.03)
TROPONIN I SERPL HS-MCNC: 10 NG/L (ref 0–9)
UROBILINOGEN UR STRIP-ACNC: 0.2 EU/DL (ref 0–1)
WBC #/AREA URNS HPF: ABNORMAL /HPF
WBC OTHER # BLD: 7.3 K/UL (ref 4.5–11.5)

## 2024-04-24 PROCEDURE — 2580000003 HC RX 258

## 2024-04-24 PROCEDURE — 96361 HYDRATE IV INFUSION ADD-ON: CPT

## 2024-04-24 PROCEDURE — 84484 ASSAY OF TROPONIN QUANT: CPT

## 2024-04-24 PROCEDURE — G0378 HOSPITAL OBSERVATION PER HR: HCPCS

## 2024-04-24 PROCEDURE — 71046 X-RAY EXAM CHEST 2 VIEWS: CPT

## 2024-04-24 PROCEDURE — 83605 ASSAY OF LACTIC ACID: CPT

## 2024-04-24 PROCEDURE — 99285 EMERGENCY DEPT VISIT HI MDM: CPT

## 2024-04-24 PROCEDURE — 93010 ELECTROCARDIOGRAM REPORT: CPT | Performed by: INTERNAL MEDICINE

## 2024-04-24 PROCEDURE — 85025 COMPLETE CBC W/AUTO DIFF WBC: CPT

## 2024-04-24 PROCEDURE — 81001 URINALYSIS AUTO W/SCOPE: CPT

## 2024-04-24 PROCEDURE — 6370000000 HC RX 637 (ALT 250 FOR IP): Performed by: INTERNAL MEDICINE

## 2024-04-24 PROCEDURE — 71275 CT ANGIOGRAPHY CHEST: CPT

## 2024-04-24 PROCEDURE — 80053 COMPREHEN METABOLIC PANEL: CPT

## 2024-04-24 PROCEDURE — 83690 ASSAY OF LIPASE: CPT

## 2024-04-24 PROCEDURE — 96375 TX/PRO/DX INJ NEW DRUG ADDON: CPT

## 2024-04-24 PROCEDURE — 6360000002 HC RX W HCPCS

## 2024-04-24 PROCEDURE — 76705 ECHO EXAM OF ABDOMEN: CPT

## 2024-04-24 PROCEDURE — 6360000004 HC RX CONTRAST MEDICATION: Performed by: RADIOLOGY

## 2024-04-24 PROCEDURE — 93005 ELECTROCARDIOGRAM TRACING: CPT

## 2024-04-24 PROCEDURE — 6360000002 HC RX W HCPCS: Performed by: INTERNAL MEDICINE

## 2024-04-24 PROCEDURE — 74174 CTA ABD&PLVS W/CONTRAST: CPT

## 2024-04-24 PROCEDURE — 96374 THER/PROPH/DIAG INJ IV PUSH: CPT

## 2024-04-24 PROCEDURE — 2580000003 HC RX 258: Performed by: INTERNAL MEDICINE

## 2024-04-24 RX ORDER — MAGNESIUM SULFATE IN WATER 40 MG/ML
2000 INJECTION, SOLUTION INTRAVENOUS PRN
Status: DISCONTINUED | OUTPATIENT
Start: 2024-04-24 | End: 2024-04-29 | Stop reason: HOSPADM

## 2024-04-24 RX ORDER — ACETAMINOPHEN 650 MG/1
650 SUPPOSITORY RECTAL EVERY 6 HOURS PRN
Status: DISCONTINUED | OUTPATIENT
Start: 2024-04-24 | End: 2024-04-26

## 2024-04-24 RX ORDER — SODIUM CHLORIDE 0.9 % (FLUSH) 0.9 %
5-40 SYRINGE (ML) INJECTION EVERY 12 HOURS SCHEDULED
Status: DISCONTINUED | OUTPATIENT
Start: 2024-04-24 | End: 2024-04-29 | Stop reason: HOSPADM

## 2024-04-24 RX ORDER — METOPROLOL SUCCINATE 25 MG/1
12.5 TABLET, EXTENDED RELEASE ORAL DAILY
Status: DISCONTINUED | OUTPATIENT
Start: 2024-04-24 | End: 2024-04-25

## 2024-04-24 RX ORDER — SODIUM CHLORIDE 9 MG/ML
INJECTION, SOLUTION INTRAVENOUS CONTINUOUS
Status: DISCONTINUED | OUTPATIENT
Start: 2024-04-24 | End: 2024-04-27

## 2024-04-24 RX ORDER — DOCUSATE SODIUM 100 MG/1
100 CAPSULE, LIQUID FILLED ORAL DAILY
Status: DISCONTINUED | OUTPATIENT
Start: 2024-04-24 | End: 2024-04-29 | Stop reason: HOSPADM

## 2024-04-24 RX ORDER — POTASSIUM CHLORIDE 20 MEQ/1
40 TABLET, EXTENDED RELEASE ORAL PRN
Status: DISCONTINUED | OUTPATIENT
Start: 2024-04-24 | End: 2024-04-29 | Stop reason: HOSPADM

## 2024-04-24 RX ORDER — ENOXAPARIN SODIUM 100 MG/ML
40 INJECTION SUBCUTANEOUS DAILY
Status: DISCONTINUED | OUTPATIENT
Start: 2024-04-24 | End: 2024-04-26

## 2024-04-24 RX ORDER — NORTRIPTYLINE HYDROCHLORIDE 25 MG/1
25 CAPSULE ORAL DAILY
Status: DISCONTINUED | OUTPATIENT
Start: 2024-04-24 | End: 2024-04-29 | Stop reason: HOSPADM

## 2024-04-24 RX ORDER — POLYETHYLENE GLYCOL 3350 17 G/17G
17 POWDER, FOR SOLUTION ORAL DAILY PRN
Status: DISCONTINUED | OUTPATIENT
Start: 2024-04-24 | End: 2024-04-29 | Stop reason: HOSPADM

## 2024-04-24 RX ORDER — ONDANSETRON 4 MG/1
4 TABLET, ORALLY DISINTEGRATING ORAL EVERY 8 HOURS PRN
Status: DISCONTINUED | OUTPATIENT
Start: 2024-04-24 | End: 2024-04-29 | Stop reason: HOSPADM

## 2024-04-24 RX ORDER — SODIUM CHLORIDE 0.9 % (FLUSH) 0.9 %
5-40 SYRINGE (ML) INJECTION PRN
Status: DISCONTINUED | OUTPATIENT
Start: 2024-04-24 | End: 2024-04-29 | Stop reason: HOSPADM

## 2024-04-24 RX ORDER — 0.9 % SODIUM CHLORIDE 0.9 %
1000 INTRAVENOUS SOLUTION INTRAVENOUS ONCE
Status: COMPLETED | OUTPATIENT
Start: 2024-04-24 | End: 2024-04-24

## 2024-04-24 RX ORDER — FENTANYL CITRATE 50 UG/ML
25 INJECTION, SOLUTION INTRAMUSCULAR; INTRAVENOUS ONCE
Status: COMPLETED | OUTPATIENT
Start: 2024-04-24 | End: 2024-04-24

## 2024-04-24 RX ORDER — SODIUM CHLORIDE 9 MG/ML
INJECTION, SOLUTION INTRAVENOUS PRN
Status: DISCONTINUED | OUTPATIENT
Start: 2024-04-24 | End: 2024-04-29 | Stop reason: HOSPADM

## 2024-04-24 RX ORDER — SODIUM CHLORIDE 9 MG/ML
INJECTION, SOLUTION INTRAVENOUS CONTINUOUS
Status: DISCONTINUED | OUTPATIENT
Start: 2024-04-24 | End: 2024-04-24

## 2024-04-24 RX ORDER — LORAZEPAM 1 MG/1
1 TABLET ORAL NIGHTLY PRN
Status: DISCONTINUED | OUTPATIENT
Start: 2024-04-24 | End: 2024-04-29 | Stop reason: HOSPADM

## 2024-04-24 RX ORDER — ACETAMINOPHEN 325 MG/1
650 TABLET ORAL EVERY 6 HOURS PRN
Status: DISCONTINUED | OUTPATIENT
Start: 2024-04-24 | End: 2024-04-26

## 2024-04-24 RX ORDER — POTASSIUM CHLORIDE 7.45 MG/ML
10 INJECTION INTRAVENOUS PRN
Status: DISCONTINUED | OUTPATIENT
Start: 2024-04-24 | End: 2024-04-29 | Stop reason: HOSPADM

## 2024-04-24 RX ORDER — POLYETHYLENE GLYCOL 3350 17 G/17G
17 POWDER, FOR SOLUTION ORAL DAILY
Status: DISCONTINUED | OUTPATIENT
Start: 2024-04-24 | End: 2024-04-29 | Stop reason: HOSPADM

## 2024-04-24 RX ORDER — ONDANSETRON 2 MG/ML
4 INJECTION INTRAMUSCULAR; INTRAVENOUS ONCE
Status: COMPLETED | OUTPATIENT
Start: 2024-04-24 | End: 2024-04-24

## 2024-04-24 RX ORDER — ONDANSETRON 2 MG/ML
4 INJECTION INTRAMUSCULAR; INTRAVENOUS EVERY 6 HOURS PRN
Status: DISCONTINUED | OUTPATIENT
Start: 2024-04-24 | End: 2024-04-29 | Stop reason: HOSPADM

## 2024-04-24 RX ORDER — FAMOTIDINE 20 MG/1
20 TABLET, FILM COATED ORAL DAILY
Status: DISCONTINUED | OUTPATIENT
Start: 2024-04-24 | End: 2024-04-25

## 2024-04-24 RX ORDER — SERTRALINE HYDROCHLORIDE 25 MG/1
25 TABLET, FILM COATED ORAL NIGHTLY
Status: DISCONTINUED | OUTPATIENT
Start: 2024-04-24 | End: 2024-04-29 | Stop reason: HOSPADM

## 2024-04-24 RX ADMIN — FENTANYL CITRATE 25 MCG: 50 INJECTION INTRAMUSCULAR; INTRAVENOUS at 10:40

## 2024-04-24 RX ADMIN — METOPROLOL SUCCINATE 12.5 MG: 25 TABLET, EXTENDED RELEASE ORAL at 20:51

## 2024-04-24 RX ADMIN — SERTRALINE HYDROCHLORIDE 25 MG: 25 TABLET ORAL at 20:51

## 2024-04-24 RX ADMIN — NORTRIPTYLINE HYDROCHLORIDE 25 MG: 25 CAPSULE ORAL at 20:51

## 2024-04-24 RX ADMIN — ENOXAPARIN SODIUM 40 MG: 100 INJECTION SUBCUTANEOUS at 20:51

## 2024-04-24 RX ADMIN — POLYETHYLENE GLYCOL 3350 17 G: 17 POWDER, FOR SOLUTION ORAL at 20:50

## 2024-04-24 RX ADMIN — SODIUM CHLORIDE: 9 INJECTION, SOLUTION INTRAVENOUS at 20:41

## 2024-04-24 RX ADMIN — IOPAMIDOL 75 ML: 755 INJECTION, SOLUTION INTRAVENOUS at 11:18

## 2024-04-24 RX ADMIN — ONDANSETRON 4 MG: 2 INJECTION INTRAMUSCULAR; INTRAVENOUS at 09:21

## 2024-04-24 RX ADMIN — DOCUSATE SODIUM 100 MG: 100 CAPSULE, LIQUID FILLED ORAL at 20:51

## 2024-04-24 RX ADMIN — SODIUM CHLORIDE 1000 ML: 9 INJECTION, SOLUTION INTRAVENOUS at 09:07

## 2024-04-24 RX ADMIN — LORAZEPAM 1 MG: 1 TABLET ORAL at 20:51

## 2024-04-24 RX ADMIN — FAMOTIDINE 20 MG: 20 TABLET, FILM COATED ORAL at 20:51

## 2024-04-24 ASSESSMENT — PAIN SCALES - GENERAL
PAINLEVEL_OUTOF10: 10
PAINLEVEL_OUTOF10: 0

## 2024-04-24 ASSESSMENT — PAIN DESCRIPTION - LOCATION: LOCATION: ABDOMEN

## 2024-04-24 ASSESSMENT — PAIN DESCRIPTION - ORIENTATION: ORIENTATION: LEFT;LOWER

## 2024-04-24 NOTE — ED PROVIDER NOTES
[facility] EMERGENCY DEPARTMENT  EMERGENCY DEPARTMENT ENCOUNTER        Pt Name: Elisa Toth  MRN: [id number]  Birthdate 1955  Date of evaluation: 4/24/2024  Provider: Rj Bartlett MD  Attending Provider: Con Elam DO  PCP: Carrie Neal DO  Note Started: 8:42 AM EDT 4/24/24    CHIEF COMPLAINT       Chief Complaint   Patient presents with    Abdominal Pain     Reports that she is having gallbladder flair ups since February and reports that she is having pains of the left upper quad radiating into her left chest. Report that she wants her gallbladder out. Reports that she cannot get out of bed and she just shakes.        HISTORY OF PRESENT ILLNESS: 1 or more Elements   History From: The patient        Elisa Toth is a 69 y.o. female with a past medical history of anxiety, portal vein thrombosis, Schatzki's ring, biliary dyskinesia presenting with abdominal pain, nausea, decreased p.o. intake.  Patient states that she has been having intermittent gallbladder flares since February.  She states that she has had decreased p.o. intake due to nausea.  She states that she does not actually vomit but dry heaves.  Patient states that she has lost weight because of her inability to eat.  Patient states that she cannot out of bed and that she just shakes due to the pain.  No anticoagulant    Nursing Notes were all reviewed and agreed with or any disagreements were addressed in the HPI.      REVIEW OF EXTERNAL NOTE :       ER visit on 4/20/2024:    MDM  Number of Diagnoses or Management Options  Anxiety state  Dyspnea, unspecified type  Diagnosis management comments: This is a 69-year-old female who presented to the ED for evaluation of anxiety as well as some shortness of breath.  Patient has been admitted twice in the past several months for the same concerning complaint.  Patient states that today she got anxious which developed her to have some shortness of  done

## 2024-04-25 ENCOUNTER — ANESTHESIA EVENT (OUTPATIENT)
Dept: OPERATING ROOM | Age: 69
End: 2024-04-25
Payer: MEDICARE

## 2024-04-25 LAB
ALBUMIN SERPL-MCNC: 4.2 G/DL (ref 3.5–5.2)
ALP SERPL-CCNC: 55 U/L (ref 35–104)
ALT SERPL-CCNC: 13 U/L (ref 0–32)
ANION GAP SERPL CALCULATED.3IONS-SCNC: 17 MMOL/L (ref 7–16)
AST SERPL-CCNC: 16 U/L (ref 0–31)
BASOPHILS # BLD: 0.07 K/UL (ref 0–0.2)
BASOPHILS NFR BLD: 1 % (ref 0–2)
BILIRUB SERPL-MCNC: 0.6 MG/DL (ref 0–1.2)
BUN SERPL-MCNC: 13 MG/DL (ref 6–23)
CALCIUM SERPL-MCNC: 9.1 MG/DL (ref 8.6–10.2)
CHLORIDE SERPL-SCNC: 107 MMOL/L (ref 98–107)
CO2 SERPL-SCNC: 17 MMOL/L (ref 22–29)
CREAT SERPL-MCNC: 0.6 MG/DL (ref 0.5–1)
EOSINOPHIL # BLD: 0.09 K/UL (ref 0.05–0.5)
EOSINOPHILS RELATIVE PERCENT: 2 % (ref 0–6)
ERYTHROCYTE [DISTWIDTH] IN BLOOD BY AUTOMATED COUNT: 13.6 % (ref 11.5–15)
GFR SERPL CREATININE-BSD FRML MDRD: >90 ML/MIN/1.73M2
GLUCOSE BLD-MCNC: 97 MG/DL (ref 74–99)
GLUCOSE SERPL-MCNC: 60 MG/DL (ref 74–99)
HCT VFR BLD AUTO: 38.5 % (ref 34–48)
HGB BLD-MCNC: 12.7 G/DL (ref 11.5–15.5)
IMM GRANULOCYTES # BLD AUTO: <0.03 K/UL (ref 0–0.58)
IMM GRANULOCYTES NFR BLD: 0 % (ref 0–5)
LYMPHOCYTES NFR BLD: 1.01 K/UL (ref 1.5–4)
LYMPHOCYTES RELATIVE PERCENT: 20 % (ref 20–42)
MCH RBC QN AUTO: 30.8 PG (ref 26–35)
MCHC RBC AUTO-ENTMCNC: 33 G/DL (ref 32–34.5)
MCV RBC AUTO: 93.4 FL (ref 80–99.9)
MONOCYTES NFR BLD: 0.46 K/UL (ref 0.1–0.95)
MONOCYTES NFR BLD: 9 % (ref 2–12)
NEUTROPHILS NFR BLD: 68 % (ref 43–80)
NEUTS SEG NFR BLD: 3.44 K/UL (ref 1.8–7.3)
PLATELET # BLD AUTO: 258 K/UL (ref 130–450)
PMV BLD AUTO: 10.3 FL (ref 7–12)
POTASSIUM SERPL-SCNC: 3.7 MMOL/L (ref 3.5–5)
PROT SERPL-MCNC: 6 G/DL (ref 6.4–8.3)
RBC # BLD AUTO: 4.12 M/UL (ref 3.5–5.5)
SODIUM SERPL-SCNC: 141 MMOL/L (ref 132–146)
WBC OTHER # BLD: 5.1 K/UL (ref 4.5–11.5)

## 2024-04-25 PROCEDURE — 85025 COMPLETE CBC W/AUTO DIFF WBC: CPT

## 2024-04-25 PROCEDURE — 6360000002 HC RX W HCPCS: Performed by: INTERNAL MEDICINE

## 2024-04-25 PROCEDURE — 97165 OT EVAL LOW COMPLEX 30 MIN: CPT

## 2024-04-25 PROCEDURE — G0378 HOSPITAL OBSERVATION PER HR: HCPCS

## 2024-04-25 PROCEDURE — 97161 PT EVAL LOW COMPLEX 20 MIN: CPT

## 2024-04-25 PROCEDURE — 36415 COLL VENOUS BLD VENIPUNCTURE: CPT

## 2024-04-25 PROCEDURE — 6370000000 HC RX 637 (ALT 250 FOR IP): Performed by: INTERNAL MEDICINE

## 2024-04-25 PROCEDURE — 82962 GLUCOSE BLOOD TEST: CPT

## 2024-04-25 PROCEDURE — 80053 COMPREHEN METABOLIC PANEL: CPT

## 2024-04-25 PROCEDURE — 2580000003 HC RX 258: Performed by: INTERNAL MEDICINE

## 2024-04-25 RX ORDER — LORAZEPAM 0.5 MG/1
0.5 TABLET ORAL 2 TIMES DAILY PRN
Status: DISCONTINUED | OUTPATIENT
Start: 2024-04-25 | End: 2024-04-29 | Stop reason: HOSPADM

## 2024-04-25 RX ORDER — INDOCYANINE GREEN AND WATER 25 MG
5 KIT INJECTION
Status: COMPLETED | OUTPATIENT
Start: 2024-04-26 | End: 2024-04-26

## 2024-04-25 RX ADMIN — LORAZEPAM 1 MG: 1 TABLET ORAL at 20:04

## 2024-04-25 RX ADMIN — SERTRALINE HYDROCHLORIDE 25 MG: 25 TABLET ORAL at 20:04

## 2024-04-25 RX ADMIN — LORAZEPAM 0.5 MG: 0.5 TABLET ORAL at 08:02

## 2024-04-25 RX ADMIN — SODIUM CHLORIDE: 9 INJECTION, SOLUTION INTRAVENOUS at 08:04

## 2024-04-25 RX ADMIN — POLYETHYLENE GLYCOL 3350 17 G: 17 POWDER, FOR SOLUTION ORAL at 08:04

## 2024-04-25 RX ADMIN — ENOXAPARIN SODIUM 40 MG: 100 INJECTION SUBCUTANEOUS at 08:04

## 2024-04-25 RX ADMIN — NORTRIPTYLINE HYDROCHLORIDE 25 MG: 25 CAPSULE ORAL at 08:11

## 2024-04-25 RX ADMIN — DOCUSATE SODIUM 100 MG: 100 CAPSULE, LIQUID FILLED ORAL at 08:09

## 2024-04-25 RX ADMIN — SODIUM CHLORIDE, PRESERVATIVE FREE 10 ML: 5 INJECTION INTRAVENOUS at 20:04

## 2024-04-25 ASSESSMENT — PAIN SCALES - GENERAL
PAINLEVEL_OUTOF10: 0
PAINLEVEL_OUTOF10: 0

## 2024-04-25 NOTE — ACP (ADVANCE CARE PLANNING)
Advance Care Planning   Healthcare Decision Maker:    Primary Decision Maker (Active): Haider Toth - [facility] - [phone]    Click here to complete Healthcare Decision Makers including selection of the Healthcare Decision Maker Relationship (ie \"Primary\").       Electronically signed by Toño Blunt RN CM on 4/25/2024 at 12:02 PM

## 2024-04-25 NOTE — H&P
[facility]              [address] [place] [address]                           HISTORY & PHYSICAL      PATIENT NAME: SADI HAIRSTON                : 1955  MED REC NO: [id number]                        ROOM: [id number]  ACCOUNT NO: [id number]                       ADMIT DATE: 2024  PROVIDER: Con Elam DO      PRIMARY CARE PHYSICIAN:  Carrie Padilla DO    CHIEF COMPLAINT:  Abdominal pain.    HISTORY OF PRESENT ILLNESS:  The patient is a 69-year-old  female who presented to the emergency room complaining of ongoing abdominal pain.  She has had diagnostic evaluation in the past which included HIDA scan, which revealed a decreased gallbladder ejection fraction.  She has significant anxiety, which is persistent.  She takes Ativan.  She is followed at [facility].  She was seen in the emergency room and admitted for further evaluation and treatment.    MEDICATIONS:  Prior to admission:  Colace, Ativan, Pamelor, GlycoLax, and sertraline.    PAST MEDICAL HISTORY:  Detached retina, anxiety.    PAST SURGICAL HISTORY:  Right eye detached retina surgery, tonsillectomy, wisdom teeth extraction, right eye cataract surgery, and left wrist ganglion surgery.    SOCIAL HISTORY:  No tobacco, no alcohol.    REVIEW OF SYSTEMS:  Remarkable for above-stated chief complaint.    ALLERGIES:  TO CODEINE AND XANAX.      PHYSICAL EXAMINATION:  GENERAL:  Reveals a 69-year-old  female, who is alert and oriented x3, cooperative, and a fair historian.  VITAL SIGNS:  On admission:  Temperature 98.5, pulse 119, respirations 16, and blood pressure 104/68.  HEENT:  Head; normocephalic, atraumatic.  Eyes; left eye pupil round and reactive to light.  Right eye minimally reactive to light.  Nose; no obstruction, polyp, or discharge noted.  Mouth; mucosa without lesion.  Pharynx; noninjected without exudate.  NECK:  Supple.  No JVD.  No thyromegaly.  No carotid

## 2024-04-25 NOTE — PLAN OF CARE
Problem: Pain  Goal: Verbalizes/displays adequate comfort level or baseline comfort level  Outcome: Progressing      Oriented - self; Oriented - place; Oriented - time

## 2024-04-26 ENCOUNTER — ANESTHESIA (OUTPATIENT)
Dept: OPERATING ROOM | Age: 69
End: 2024-04-26
Payer: MEDICARE

## 2024-04-26 LAB
ABO + RH BLD: NORMAL
ALBUMIN SERPL-MCNC: 3.5 G/DL (ref 3.5–5.2)
ALP SERPL-CCNC: 52 U/L (ref 35–104)
ALT SERPL-CCNC: 10 U/L (ref 0–32)
ANION GAP SERPL CALCULATED.3IONS-SCNC: 11 MMOL/L (ref 7–16)
ARM BAND NUMBER: NORMAL
AST SERPL-CCNC: 12 U/L (ref 0–31)
BASOPHILS # BLD: 0.05 K/UL (ref 0–0.2)
BASOPHILS NFR BLD: 1 % (ref 0–2)
BILIRUB SERPL-MCNC: 0.3 MG/DL (ref 0–1.2)
BLOOD BANK SAMPLE EXPIRATION: NORMAL
BLOOD GROUP ANTIBODIES SERPL: NEGATIVE
BUN SERPL-MCNC: 9 MG/DL (ref 6–23)
CALCIUM SERPL-MCNC: 8.4 MG/DL (ref 8.6–10.2)
CHLORIDE SERPL-SCNC: 106 MMOL/L (ref 98–107)
CO2 SERPL-SCNC: 21 MMOL/L (ref 22–29)
CREAT SERPL-MCNC: 0.5 MG/DL (ref 0.5–1)
EOSINOPHIL # BLD: 0.27 K/UL (ref 0.05–0.5)
EOSINOPHILS RELATIVE PERCENT: 5 % (ref 0–6)
ERYTHROCYTE [DISTWIDTH] IN BLOOD BY AUTOMATED COUNT: 13.5 % (ref 11.5–15)
GFR SERPL CREATININE-BSD FRML MDRD: >90 ML/MIN/1.73M2
GLUCOSE SERPL-MCNC: 80 MG/DL (ref 74–99)
HCT VFR BLD AUTO: 36.2 % (ref 34–48)
HGB BLD-MCNC: 12.4 G/DL (ref 11.5–15.5)
IMM GRANULOCYTES # BLD AUTO: <0.03 K/UL (ref 0–0.58)
IMM GRANULOCYTES NFR BLD: 0 % (ref 0–5)
INR PPP: 1
LYMPHOCYTES NFR BLD: 1.03 K/UL (ref 1.5–4)
LYMPHOCYTES RELATIVE PERCENT: 20 % (ref 20–42)
MCH RBC QN AUTO: 30.9 PG (ref 26–35)
MCHC RBC AUTO-ENTMCNC: 34.3 G/DL (ref 32–34.5)
MCV RBC AUTO: 90.3 FL (ref 80–99.9)
MONOCYTES NFR BLD: 0.59 K/UL (ref 0.1–0.95)
MONOCYTES NFR BLD: 11 % (ref 2–12)
NEUTROPHILS NFR BLD: 63 % (ref 43–80)
NEUTS SEG NFR BLD: 3.31 K/UL (ref 1.8–7.3)
PLATELET # BLD AUTO: 254 K/UL (ref 130–450)
PMV BLD AUTO: 10.1 FL (ref 7–12)
POTASSIUM SERPL-SCNC: 3.4 MMOL/L (ref 3.5–5)
PROT SERPL-MCNC: 5.1 G/DL (ref 6.4–8.3)
PROTHROMBIN TIME: 11.2 SEC (ref 9.3–12.4)
RBC # BLD AUTO: 4.01 M/UL (ref 3.5–5.5)
SODIUM SERPL-SCNC: 138 MMOL/L (ref 132–146)
WBC OTHER # BLD: 5.3 K/UL (ref 4.5–11.5)

## 2024-04-26 PROCEDURE — 2500000003 HC RX 250 WO HCPCS: Performed by: STUDENT IN AN ORGANIZED HEALTH CARE EDUCATION/TRAINING PROGRAM

## 2024-04-26 PROCEDURE — 3700000001 HC ADD 15 MINUTES (ANESTHESIA): Performed by: STUDENT IN AN ORGANIZED HEALTH CARE EDUCATION/TRAINING PROGRAM

## 2024-04-26 PROCEDURE — 6370000000 HC RX 637 (ALT 250 FOR IP): Performed by: STUDENT IN AN ORGANIZED HEALTH CARE EDUCATION/TRAINING PROGRAM

## 2024-04-26 PROCEDURE — 8E0W4CZ ROBOTIC ASSISTED PROCEDURE OF TRUNK REGION, PERCUTANEOUS ENDOSCOPIC APPROACH: ICD-10-PCS | Performed by: STUDENT IN AN ORGANIZED HEALTH CARE EDUCATION/TRAINING PROGRAM

## 2024-04-26 PROCEDURE — 7100000000 HC PACU RECOVERY - FIRST 15 MIN: Performed by: STUDENT IN AN ORGANIZED HEALTH CARE EDUCATION/TRAINING PROGRAM

## 2024-04-26 PROCEDURE — 3700000000 HC ANESTHESIA ATTENDED CARE: Performed by: STUDENT IN AN ORGANIZED HEALTH CARE EDUCATION/TRAINING PROGRAM

## 2024-04-26 PROCEDURE — 6360000002 HC RX W HCPCS: Performed by: INTERNAL MEDICINE

## 2024-04-26 PROCEDURE — 7100000001 HC PACU RECOVERY - ADDTL 15 MIN: Performed by: STUDENT IN AN ORGANIZED HEALTH CARE EDUCATION/TRAINING PROGRAM

## 2024-04-26 PROCEDURE — 2709999900 HC NON-CHARGEABLE SUPPLY: Performed by: STUDENT IN AN ORGANIZED HEALTH CARE EDUCATION/TRAINING PROGRAM

## 2024-04-26 PROCEDURE — 6370000000 HC RX 637 (ALT 250 FOR IP): Performed by: INTERNAL MEDICINE

## 2024-04-26 PROCEDURE — C1889 IMPLANT/INSERT DEVICE, NOC: HCPCS | Performed by: STUDENT IN AN ORGANIZED HEALTH CARE EDUCATION/TRAINING PROGRAM

## 2024-04-26 PROCEDURE — 85025 COMPLETE CBC W/AUTO DIFF WBC: CPT

## 2024-04-26 PROCEDURE — G0378 HOSPITAL OBSERVATION PER HR: HCPCS

## 2024-04-26 PROCEDURE — 86901 BLOOD TYPING SEROLOGIC RH(D): CPT

## 2024-04-26 PROCEDURE — 88304 TISSUE EXAM BY PATHOLOGIST: CPT

## 2024-04-26 PROCEDURE — 80053 COMPREHEN METABOLIC PANEL: CPT

## 2024-04-26 PROCEDURE — 86850 RBC ANTIBODY SCREEN: CPT

## 2024-04-26 PROCEDURE — 6360000002 HC RX W HCPCS: Performed by: STUDENT IN AN ORGANIZED HEALTH CARE EDUCATION/TRAINING PROGRAM

## 2024-04-26 PROCEDURE — 6360000002 HC RX W HCPCS

## 2024-04-26 PROCEDURE — 3600000006 HC SURGERY LEVEL 6 BASE: Performed by: STUDENT IN AN ORGANIZED HEALTH CARE EDUCATION/TRAINING PROGRAM

## 2024-04-26 PROCEDURE — 36415 COLL VENOUS BLD VENIPUNCTURE: CPT

## 2024-04-26 PROCEDURE — 3600000016 HC SURGERY LEVEL 6 ADDTL 15MIN: Performed by: STUDENT IN AN ORGANIZED HEALTH CARE EDUCATION/TRAINING PROGRAM

## 2024-04-26 PROCEDURE — 2580000003 HC RX 258: Performed by: INTERNAL MEDICINE

## 2024-04-26 PROCEDURE — 2500000003 HC RX 250 WO HCPCS: Performed by: NURSE ANESTHETIST, CERTIFIED REGISTERED

## 2024-04-26 PROCEDURE — 0FT44ZZ RESECTION OF GALLBLADDER, PERCUTANEOUS ENDOSCOPIC APPROACH: ICD-10-PCS | Performed by: STUDENT IN AN ORGANIZED HEALTH CARE EDUCATION/TRAINING PROGRAM

## 2024-04-26 PROCEDURE — 85610 PROTHROMBIN TIME: CPT

## 2024-04-26 PROCEDURE — 86900 BLOOD TYPING SEROLOGIC ABO: CPT

## 2024-04-26 PROCEDURE — 2580000003 HC RX 258: Performed by: STUDENT IN AN ORGANIZED HEALTH CARE EDUCATION/TRAINING PROGRAM

## 2024-04-26 PROCEDURE — 6370000000 HC RX 637 (ALT 250 FOR IP)

## 2024-04-26 PROCEDURE — 6360000002 HC RX W HCPCS: Performed by: NURSE ANESTHETIST, CERTIFIED REGISTERED

## 2024-04-26 PROCEDURE — 47563 LAPARO CHOLECYSTECTOMY/GRAPH: CPT | Performed by: STUDENT IN AN ORGANIZED HEALTH CARE EDUCATION/TRAINING PROGRAM

## 2024-04-26 PROCEDURE — BF50200 OTHER IMAGING OF BILE DUCTS USING FLUORESCING AGENT, INDOCYANINE GREEN DYE, INTRAOPERATIVE: ICD-10-PCS | Performed by: STUDENT IN AN ORGANIZED HEALTH CARE EDUCATION/TRAINING PROGRAM

## 2024-04-26 DEVICE — CLIP INT M L POLYMER LOK LIG HEM O LOK: Type: IMPLANTABLE DEVICE | Site: ABDOMEN | Status: FUNCTIONAL

## 2024-04-26 RX ORDER — HYDROMORPHONE HYDROCHLORIDE 1 MG/ML
0.5 INJECTION, SOLUTION INTRAMUSCULAR; INTRAVENOUS; SUBCUTANEOUS EVERY 5 MIN PRN
Status: DISCONTINUED | OUTPATIENT
Start: 2024-04-26 | End: 2024-04-26 | Stop reason: HOSPADM

## 2024-04-26 RX ORDER — POTASSIUM CHLORIDE 20 MEQ/1
20 TABLET, EXTENDED RELEASE ORAL ONCE
Status: DISCONTINUED | OUTPATIENT
Start: 2024-04-26 | End: 2024-04-26

## 2024-04-26 RX ORDER — ROCURONIUM BROMIDE 10 MG/ML
INJECTION, SOLUTION INTRAVENOUS PRN
Status: DISCONTINUED | OUTPATIENT
Start: 2024-04-26 | End: 2024-04-26 | Stop reason: SDUPTHER

## 2024-04-26 RX ORDER — PROPOFOL 10 MG/ML
INJECTION, EMULSION INTRAVENOUS PRN
Status: DISCONTINUED | OUTPATIENT
Start: 2024-04-26 | End: 2024-04-26 | Stop reason: SDUPTHER

## 2024-04-26 RX ORDER — FENTANYL CITRATE 50 UG/ML
INJECTION, SOLUTION INTRAMUSCULAR; INTRAVENOUS PRN
Status: DISCONTINUED | OUTPATIENT
Start: 2024-04-26 | End: 2024-04-26 | Stop reason: SDUPTHER

## 2024-04-26 RX ORDER — DEXAMETHASONE SODIUM PHOSPHATE 10 MG/ML
INJECTION INTRAMUSCULAR; INTRAVENOUS PRN
Status: DISCONTINUED | OUTPATIENT
Start: 2024-04-26 | End: 2024-04-26 | Stop reason: SDUPTHER

## 2024-04-26 RX ORDER — MEPERIDINE HYDROCHLORIDE 25 MG/ML
12.5 INJECTION INTRAMUSCULAR; INTRAVENOUS; SUBCUTANEOUS EVERY 5 MIN PRN
Status: DISCONTINUED | OUTPATIENT
Start: 2024-04-26 | End: 2024-04-26 | Stop reason: HOSPADM

## 2024-04-26 RX ORDER — BUPIVACAINE HYDROCHLORIDE 5 MG/ML
INJECTION, SOLUTION EPIDURAL; INTRACAUDAL PRN
Status: DISCONTINUED | OUTPATIENT
Start: 2024-04-26 | End: 2024-04-26 | Stop reason: ALTCHOICE

## 2024-04-26 RX ORDER — ENOXAPARIN SODIUM 100 MG/ML
30 INJECTION SUBCUTANEOUS DAILY
Status: DISCONTINUED | OUTPATIENT
Start: 2024-04-27 | End: 2024-04-29 | Stop reason: HOSPADM

## 2024-04-26 RX ORDER — MIDAZOLAM HYDROCHLORIDE 1 MG/ML
INJECTION INTRAMUSCULAR; INTRAVENOUS PRN
Status: DISCONTINUED | OUTPATIENT
Start: 2024-04-26 | End: 2024-04-26 | Stop reason: SDUPTHER

## 2024-04-26 RX ORDER — OXYCODONE HYDROCHLORIDE 5 MG/1
2.5 TABLET ORAL EVERY 6 HOURS PRN
Qty: 10 TABLET | Refills: 0 | Status: SHIPPED | OUTPATIENT
Start: 2024-04-26 | End: 2024-05-01

## 2024-04-26 RX ORDER — SENNA AND DOCUSATE SODIUM 50; 8.6 MG/1; MG/1
2 TABLET, FILM COATED ORAL 2 TIMES DAILY
Qty: 28 TABLET | Refills: 0 | Status: SHIPPED | OUTPATIENT
Start: 2024-04-26

## 2024-04-26 RX ORDER — ONDANSETRON 4 MG/1
4 TABLET, ORALLY DISINTEGRATING ORAL 3 TIMES DAILY PRN
Qty: 21 TABLET | Refills: 0 | Status: SHIPPED | OUTPATIENT
Start: 2024-04-26

## 2024-04-26 RX ORDER — SODIUM CHLORIDE 0.9 % (FLUSH) 0.9 %
5-40 SYRINGE (ML) INJECTION PRN
Status: DISCONTINUED | OUTPATIENT
Start: 2024-04-26 | End: 2024-04-26 | Stop reason: HOSPADM

## 2024-04-26 RX ORDER — SODIUM CHLORIDE 9 MG/ML
INJECTION, SOLUTION INTRAVENOUS PRN
Status: DISCONTINUED | OUTPATIENT
Start: 2024-04-26 | End: 2024-04-26 | Stop reason: HOSPADM

## 2024-04-26 RX ORDER — POTASSIUM CHLORIDE 7.45 MG/ML
10 INJECTION INTRAVENOUS
Status: COMPLETED | OUTPATIENT
Start: 2024-04-26 | End: 2024-04-27

## 2024-04-26 RX ORDER — ACETAMINOPHEN 500 MG
1000 TABLET ORAL EVERY 8 HOURS SCHEDULED
Status: DISCONTINUED | OUTPATIENT
Start: 2024-04-26 | End: 2024-04-29 | Stop reason: HOSPADM

## 2024-04-26 RX ORDER — ONDANSETRON 2 MG/ML
INJECTION INTRAMUSCULAR; INTRAVENOUS PRN
Status: DISCONTINUED | OUTPATIENT
Start: 2024-04-26 | End: 2024-04-26 | Stop reason: SDUPTHER

## 2024-04-26 RX ORDER — OXYCODONE HYDROCHLORIDE 5 MG/1
2.5 TABLET ORAL EVERY 4 HOURS PRN
Status: DISCONTINUED | OUTPATIENT
Start: 2024-04-26 | End: 2024-04-29 | Stop reason: HOSPADM

## 2024-04-26 RX ORDER — SODIUM CHLORIDE 0.9 % (FLUSH) 0.9 %
5-40 SYRINGE (ML) INJECTION EVERY 12 HOURS SCHEDULED
Status: DISCONTINUED | OUTPATIENT
Start: 2024-04-26 | End: 2024-04-26 | Stop reason: HOSPADM

## 2024-04-26 RX ORDER — NALOXONE HYDROCHLORIDE 0.4 MG/ML
INJECTION, SOLUTION INTRAMUSCULAR; INTRAVENOUS; SUBCUTANEOUS PRN
Status: DISCONTINUED | OUTPATIENT
Start: 2024-04-26 | End: 2024-04-26 | Stop reason: HOSPADM

## 2024-04-26 RX ORDER — LIDOCAINE HYDROCHLORIDE 20 MG/ML
INJECTION, SOLUTION INTRAVENOUS PRN
Status: DISCONTINUED | OUTPATIENT
Start: 2024-04-26 | End: 2024-04-26 | Stop reason: SDUPTHER

## 2024-04-26 RX ADMIN — ROCURONIUM BROMIDE 10 MG: 10 INJECTION INTRAVENOUS at 13:49

## 2024-04-26 RX ADMIN — DOCUSATE SODIUM 100 MG: 100 CAPSULE, LIQUID FILLED ORAL at 08:29

## 2024-04-26 RX ADMIN — POTASSIUM BICARBONATE 20 MEQ: 782 TABLET, EFFERVESCENT ORAL at 06:47

## 2024-04-26 RX ADMIN — ROCURONIUM BROMIDE 30 MG: 10 INJECTION INTRAVENOUS at 13:09

## 2024-04-26 RX ADMIN — INDOCYANINE GREEN AND WATER 5 MG: KIT at 12:58

## 2024-04-26 RX ADMIN — LIDOCAINE HYDROCHLORIDE 60 MG: 20 INJECTION, SOLUTION INTRAVENOUS at 13:09

## 2024-04-26 RX ADMIN — SODIUM CHLORIDE, PRESERVATIVE FREE 10 ML: 5 INJECTION INTRAVENOUS at 21:07

## 2024-04-26 RX ADMIN — PROPOFOL 100 MG: 10 INJECTION, EMULSION INTRAVENOUS at 13:09

## 2024-04-26 RX ADMIN — OXYCODONE 2.5 MG: 5 TABLET ORAL at 17:00

## 2024-04-26 RX ADMIN — POTASSIUM CHLORIDE 10 MEQ: 7.46 INJECTION, SOLUTION INTRAVENOUS at 08:26

## 2024-04-26 RX ADMIN — CEFAZOLIN 2000 MG: 2 INJECTION, POWDER, FOR SOLUTION INTRAMUSCULAR; INTRAVENOUS at 13:13

## 2024-04-26 RX ADMIN — POTASSIUM CHLORIDE 10 MEQ: 7.46 INJECTION, SOLUTION INTRAVENOUS at 09:45

## 2024-04-26 RX ADMIN — LORAZEPAM 1 MG: 1 TABLET ORAL at 21:00

## 2024-04-26 RX ADMIN — FENTANYL CITRATE 100 MCG: 0.05 INJECTION, SOLUTION INTRAMUSCULAR; INTRAVENOUS at 13:09

## 2024-04-26 RX ADMIN — ROCURONIUM BROMIDE 20 MG: 10 INJECTION INTRAVENOUS at 13:26

## 2024-04-26 RX ADMIN — MIDAZOLAM 1 MG: 1 INJECTION INTRAMUSCULAR; INTRAVENOUS at 12:58

## 2024-04-26 RX ADMIN — SERTRALINE HYDROCHLORIDE 25 MG: 25 TABLET ORAL at 21:00

## 2024-04-26 RX ADMIN — LORAZEPAM 0.5 MG: 0.5 TABLET ORAL at 08:28

## 2024-04-26 RX ADMIN — DEXAMETHASONE SODIUM PHOSPHATE 10 MG: 10 INJECTION INTRAMUSCULAR; INTRAVENOUS at 13:16

## 2024-04-26 RX ADMIN — ONDANSETRON 4 MG: 2 INJECTION INTRAMUSCULAR; INTRAVENOUS at 13:16

## 2024-04-26 RX ADMIN — POTASSIUM CHLORIDE 10 MEQ: 7.46 INJECTION, SOLUTION INTRAVENOUS at 06:48

## 2024-04-26 RX ADMIN — POTASSIUM CHLORIDE 10 MEQ: 7.46 INJECTION, SOLUTION INTRAVENOUS at 05:14

## 2024-04-26 RX ADMIN — POTASSIUM CHLORIDE 10 MEQ: 7.46 INJECTION, SOLUTION INTRAVENOUS at 10:15

## 2024-04-26 RX ADMIN — ROCURONIUM BROMIDE 20 MG: 10 INJECTION INTRAVENOUS at 13:18

## 2024-04-26 RX ADMIN — SUGAMMADEX 175 MG: 100 INJECTION, SOLUTION INTRAVENOUS at 14:04

## 2024-04-26 RX ADMIN — NORTRIPTYLINE HYDROCHLORIDE 25 MG: 25 CAPSULE ORAL at 08:29

## 2024-04-26 RX ADMIN — ACETAMINOPHEN 1000 MG: 325 TABLET ORAL at 18:55

## 2024-04-26 ASSESSMENT — PAIN SCALES - GENERAL
PAINLEVEL_OUTOF10: 4
PAINLEVEL_OUTOF10: 2
PAINLEVEL_OUTOF10: 5
PAINLEVEL_OUTOF10: 0

## 2024-04-26 ASSESSMENT — PAIN DESCRIPTION - LOCATION
LOCATION: ABDOMEN
LOCATION: ABDOMEN

## 2024-04-26 ASSESSMENT — ENCOUNTER SYMPTOMS: SHORTNESS OF BREATH: 1

## 2024-04-26 ASSESSMENT — PAIN DESCRIPTION - FREQUENCY: FREQUENCY: INTERMITTENT

## 2024-04-26 ASSESSMENT — PAIN - FUNCTIONAL ASSESSMENT
PAIN_FUNCTIONAL_ASSESSMENT: ADULT NONVERBAL PAIN SCALE (NPVS)
PAIN_FUNCTIONAL_ASSESSMENT: ACTIVITIES ARE NOT PREVENTED

## 2024-04-26 ASSESSMENT — PAIN DESCRIPTION - DESCRIPTORS
DESCRIPTORS: SORE;STABBING;TENDER
DESCRIPTORS: DISCOMFORT;SORE;TENDER

## 2024-04-26 ASSESSMENT — PAIN DESCRIPTION - ORIENTATION: ORIENTATION: MID;LOWER

## 2024-04-26 NOTE — ANESTHESIA POSTPROCEDURE EVALUATION
Department of Anesthesiology  Postprocedure Note    Patient: Elisa Toth  MRN: [id number]  YOB: 1955  Date of evaluation: 4/28/2024    Procedure Summary       Date: 04/26/24 Room / Location: [address]    Anesthesia Start: 1258 Anesthesia Stop:     Procedure: Laparoscopic robotic assisted cholecystectomy, possible open, possible cholangiogram (Abdomen) Diagnosis:       Biliary dyskinesia      (Biliary dyskinesia [K82.8])    Surgeons: Lisa Rowland MD Responsible Provider: Manolo Alfredo MD    Anesthesia Type: general ASA Status: 3            Anesthesia Type: No value filed.    Mary Phase I:      Mary Phase II:      Anesthesia Post Evaluation    Patient location during evaluation: PACU  Patient participation: complete - patient participated  Level of consciousness: awake  Pain score: 3  Airway patency: patent  Nausea & Vomiting: no nausea and no vomiting  Cardiovascular status: blood pressure returned to baseline  Respiratory status: acceptable  Hydration status: euvolemic      No notable events documented.

## 2024-04-26 NOTE — ANESTHESIA PRE PROCEDURE
NV  Otherwise normal ECG  When compared with ECG of 20-APR-2024 15:02,  No significant change was found             STRESS -  Stress Test: A pharmacological stress test was performed using lexiscan. PO caffeine given as a reversal agent.    Stress ECG: The stress ECG was non-diagnostic due to baseline nonspecific STT changes.    Perfusion Comments: LV perfusion is normal. There is no evidence of inducible ischemia.    Perfusion Defect: There is a left ventricular stress perfusion defect present in the mid to distal anteroseptal, inferoseptal and apical septal segment(s) that is fixed. The defect appears to be an artifact.    Stress Function: Left ventricular function post-stress is normal. Post-stress ejection fraction is 72%. The stress end diastolic cavity size is normal.    Perfusion Conclusion: There is no evidence of transient ischemic dilation (TID). TID ratio is 1.44.    Stress Combined Conclusion: Normal pharmacological myocardial perfusion study with attenuation artifact. Findings suggest a low risk of cardiac events.    Anesthesia Evaluation  Patient summary reviewed and Nursing notes reviewed   no history of anesthetic complications:   Airway: Mallampati: III  TM distance: >3 FB   Neck ROM: full  Mouth opening: > = 3 FB   Dental:    (+) poor dentition      Pulmonary:normal exam  breath sounds clear to auscultation  (+)   shortness of breath:                   Patient did not smoke on day of surgery.                 Cardiovascular:  Exercise tolerance: poor (<4 METS)  (+) hypertension:, angina: no interval change, dysrhythmias (hx): SVT      ECG reviewed  Rhythm: regular  Rate: normal           Beta Blocker:  Not on Beta Blocker         Neuro/Psych:   (+) psychiatric history:depression/anxiety  (worsenening anxiety)            GI/Hepatic/Renal:   (+) GERD:         ROS comment: Schatzki's ring, malnutrition, billiary dyskinesia.   Endo/Other:    (+) : arthritis:..                  ROS comment: Hx retinal

## 2024-04-26 NOTE — DISCHARGE INSTRUCTIONS
Home Care   Keep the incision area clean and dry   Okay to take a shower starting tomorrow, do not submerge your incisions under water  Place ice on incisions to decrease the pain and bruising    Diet  Okay to resume diet you were taking prior to surgery    Physical Activity   Walk frequently, do not perform strenuous activity but okay for household chores, etc.  Breath deeply every hour to prevent pneumonia  No heavy lifting over 10lbs for 4wks    Work   Okay to return to work when your pain is controlled  Avoid heavy lifting while at work     Medications   Over-the-counter Tylenol and ibuprofen are okay to take for pain  Do not take more than directions on package  No driving until off narcotics  If given antibiotics, take them as prescribed    Follow-up   Follow-up as scheduled in 2 weeks with Dr. McCarron    Call Your Doctor If Any of the Following Occurs   Signs of infection, including fever (>101.5F) and chills   Redness, swelling, increasing pain, excessive bleeding, or discharge at the incision site   Cough, shortness of breath, chest pain   Increased abdominal pain   Nausea and/or vomiting that does not resolve off narcotics.   Pain, burning, urgency or frequency of urination, or blood in the urine   Pain and/or swelling in your feet, calves, or legs   Dark urine, light stools, or evidence of jaundice (yellowing of the skin or eyes)    In case of an emergency, CALL 911 immediately.

## 2024-04-27 LAB
ALBUMIN SERPL-MCNC: 3.9 G/DL (ref 3.5–5.2)
ALP SERPL-CCNC: 56 U/L (ref 35–104)
ALT SERPL-CCNC: 20 U/L (ref 0–32)
ANION GAP SERPL CALCULATED.3IONS-SCNC: 14 MMOL/L (ref 7–16)
AST SERPL-CCNC: 24 U/L (ref 0–31)
BASOPHILS # BLD: 0.03 K/UL (ref 0–0.2)
BASOPHILS NFR BLD: 0 % (ref 0–2)
BILIRUB SERPL-MCNC: 0.4 MG/DL (ref 0–1.2)
BUN SERPL-MCNC: 5 MG/DL (ref 6–23)
CALCIUM SERPL-MCNC: 9.2 MG/DL (ref 8.6–10.2)
CHLORIDE SERPL-SCNC: 104 MMOL/L (ref 98–107)
CO2 SERPL-SCNC: 22 MMOL/L (ref 22–29)
CREAT SERPL-MCNC: 0.7 MG/DL (ref 0.5–1)
EOSINOPHIL # BLD: 0.08 K/UL (ref 0.05–0.5)
EOSINOPHILS RELATIVE PERCENT: 1 % (ref 0–6)
ERYTHROCYTE [DISTWIDTH] IN BLOOD BY AUTOMATED COUNT: 13.7 % (ref 11.5–15)
GFR SERPL CREATININE-BSD FRML MDRD: >90 ML/MIN/1.73M2
GLUCOSE SERPL-MCNC: 97 MG/DL (ref 74–99)
HCT VFR BLD AUTO: 36.5 % (ref 34–48)
HGB BLD-MCNC: 12.6 G/DL (ref 11.5–15.5)
IMM GRANULOCYTES # BLD AUTO: 0.04 K/UL (ref 0–0.58)
IMM GRANULOCYTES NFR BLD: 0 % (ref 0–5)
LYMPHOCYTES NFR BLD: 1.08 K/UL (ref 1.5–4)
LYMPHOCYTES RELATIVE PERCENT: 11 % (ref 20–42)
MAGNESIUM SERPL-MCNC: 1.8 MG/DL (ref 1.6–2.6)
MCH RBC QN AUTO: 31 PG (ref 26–35)
MCHC RBC AUTO-ENTMCNC: 34.5 G/DL (ref 32–34.5)
MCV RBC AUTO: 89.9 FL (ref 80–99.9)
MONOCYTES NFR BLD: 0.93 K/UL (ref 0.1–0.95)
MONOCYTES NFR BLD: 9 % (ref 2–12)
NEUTROPHILS NFR BLD: 79 % (ref 43–80)
NEUTS SEG NFR BLD: 7.97 K/UL (ref 1.8–7.3)
PLATELET # BLD AUTO: 277 K/UL (ref 130–450)
PMV BLD AUTO: 10.3 FL (ref 7–12)
POTASSIUM SERPL-SCNC: 4 MMOL/L (ref 3.5–5)
PROT SERPL-MCNC: 5.9 G/DL (ref 6.4–8.3)
RBC # BLD AUTO: 4.06 M/UL (ref 3.5–5.5)
SODIUM SERPL-SCNC: 140 MMOL/L (ref 132–146)
WBC OTHER # BLD: 10.1 K/UL (ref 4.5–11.5)

## 2024-04-27 PROCEDURE — 83735 ASSAY OF MAGNESIUM: CPT

## 2024-04-27 PROCEDURE — 6360000002 HC RX W HCPCS: Performed by: INTERNAL MEDICINE

## 2024-04-27 PROCEDURE — 1200000000 HC SEMI PRIVATE

## 2024-04-27 PROCEDURE — 2580000003 HC RX 258: Performed by: STUDENT IN AN ORGANIZED HEALTH CARE EDUCATION/TRAINING PROGRAM

## 2024-04-27 PROCEDURE — 6370000000 HC RX 637 (ALT 250 FOR IP): Performed by: STUDENT IN AN ORGANIZED HEALTH CARE EDUCATION/TRAINING PROGRAM

## 2024-04-27 PROCEDURE — 80053 COMPREHEN METABOLIC PANEL: CPT

## 2024-04-27 PROCEDURE — 85025 COMPLETE CBC W/AUTO DIFF WBC: CPT

## 2024-04-27 PROCEDURE — 36415 COLL VENOUS BLD VENIPUNCTURE: CPT

## 2024-04-27 RX ADMIN — POLYETHYLENE GLYCOL 3350 17 G: 17 POWDER, FOR SOLUTION ORAL at 07:45

## 2024-04-27 RX ADMIN — NORTRIPTYLINE HYDROCHLORIDE 25 MG: 25 CAPSULE ORAL at 07:44

## 2024-04-27 RX ADMIN — LORAZEPAM 0.5 MG: 0.5 TABLET ORAL at 07:44

## 2024-04-27 RX ADMIN — ACETAMINOPHEN 1000 MG: 325 TABLET ORAL at 06:04

## 2024-04-27 RX ADMIN — SODIUM CHLORIDE, PRESERVATIVE FREE 10 ML: 5 INJECTION INTRAVENOUS at 07:38

## 2024-04-27 RX ADMIN — ACETAMINOPHEN 1000 MG: 325 TABLET ORAL at 13:59

## 2024-04-27 RX ADMIN — OXYCODONE 2.5 MG: 5 TABLET ORAL at 07:44

## 2024-04-27 RX ADMIN — LORAZEPAM 0.5 MG: 0.5 TABLET ORAL at 14:06

## 2024-04-27 RX ADMIN — LORAZEPAM 1 MG: 1 TABLET ORAL at 20:44

## 2024-04-27 RX ADMIN — ACETAMINOPHEN 1000 MG: 325 TABLET ORAL at 20:41

## 2024-04-27 RX ADMIN — SODIUM CHLORIDE, PRESERVATIVE FREE 10 ML: 5 INJECTION INTRAVENOUS at 20:42

## 2024-04-27 RX ADMIN — DOCUSATE SODIUM 100 MG: 100 CAPSULE, LIQUID FILLED ORAL at 07:44

## 2024-04-27 RX ADMIN — OXYCODONE 2.5 MG: 5 TABLET ORAL at 11:55

## 2024-04-27 RX ADMIN — ENOXAPARIN SODIUM 30 MG: 100 INJECTION SUBCUTANEOUS at 07:38

## 2024-04-27 ASSESSMENT — PAIN DESCRIPTION - ORIENTATION
ORIENTATION: LOWER
ORIENTATION: MID;LOWER
ORIENTATION: LOWER

## 2024-04-27 ASSESSMENT — PAIN DESCRIPTION - DESCRIPTORS
DESCRIPTORS: ACHING;DISCOMFORT;DULL
DESCRIPTORS: DISCOMFORT;ACHING;THROBBING
DESCRIPTORS: ACHING;DISCOMFORT;DULL

## 2024-04-27 ASSESSMENT — PAIN DESCRIPTION - FREQUENCY
FREQUENCY: INTERMITTENT

## 2024-04-27 ASSESSMENT — PAIN DESCRIPTION - LOCATION
LOCATION: ABDOMEN

## 2024-04-27 ASSESSMENT — PAIN DESCRIPTION - PAIN TYPE
TYPE: ACUTE PAIN;SURGICAL PAIN

## 2024-04-27 ASSESSMENT — PAIN - FUNCTIONAL ASSESSMENT
PAIN_FUNCTIONAL_ASSESSMENT: PREVENTS OR INTERFERES SOME ACTIVE ACTIVITIES AND ADLS
PAIN_FUNCTIONAL_ASSESSMENT: PREVENTS OR INTERFERES SOME ACTIVE ACTIVITIES AND ADLS
PAIN_FUNCTIONAL_ASSESSMENT: ACTIVITIES ARE NOT PREVENTED
PAIN_FUNCTIONAL_ASSESSMENT: PREVENTS OR INTERFERES SOME ACTIVE ACTIVITIES AND ADLS
PAIN_FUNCTIONAL_ASSESSMENT: PREVENTS OR INTERFERES SOME ACTIVE ACTIVITIES AND ADLS

## 2024-04-27 ASSESSMENT — PAIN DESCRIPTION - ONSET
ONSET: GRADUAL
ONSET: ON-GOING

## 2024-04-27 ASSESSMENT — PAIN SCALES - GENERAL
PAINLEVEL_OUTOF10: 10
PAINLEVEL_OUTOF10: 7
PAINLEVEL_OUTOF10: 2
PAINLEVEL_OUTOF10: 10
PAINLEVEL_OUTOF10: 6
PAINLEVEL_OUTOF10: 7
PAINLEVEL_OUTOF10: 7
PAINLEVEL_OUTOF10: 2

## 2024-04-28 ENCOUNTER — APPOINTMENT (OUTPATIENT)
Dept: GENERAL RADIOLOGY | Age: 69
DRG: 418 | End: 2024-04-28
Payer: MEDICARE

## 2024-04-28 PROCEDURE — 6360000002 HC RX W HCPCS: Performed by: INTERNAL MEDICINE

## 2024-04-28 PROCEDURE — 6370000000 HC RX 637 (ALT 250 FOR IP): Performed by: STUDENT IN AN ORGANIZED HEALTH CARE EDUCATION/TRAINING PROGRAM

## 2024-04-28 PROCEDURE — 2580000003 HC RX 258: Performed by: STUDENT IN AN ORGANIZED HEALTH CARE EDUCATION/TRAINING PROGRAM

## 2024-04-28 PROCEDURE — 71045 X-RAY EXAM CHEST 1 VIEW: CPT

## 2024-04-28 PROCEDURE — 6360000002 HC RX W HCPCS: Performed by: STUDENT IN AN ORGANIZED HEALTH CARE EDUCATION/TRAINING PROGRAM

## 2024-04-28 PROCEDURE — 1200000000 HC SEMI PRIVATE

## 2024-04-28 RX ORDER — ALBUTEROL SULFATE 2.5 MG/3ML
2.5 SOLUTION RESPIRATORY (INHALATION) EVERY 6 HOURS PRN
Status: DISCONTINUED | OUTPATIENT
Start: 2024-04-28 | End: 2024-04-29 | Stop reason: HOSPADM

## 2024-04-28 RX ADMIN — DOCUSATE SODIUM 100 MG: 100 CAPSULE, LIQUID FILLED ORAL at 07:51

## 2024-04-28 RX ADMIN — LORAZEPAM 1 MG: 1 TABLET ORAL at 22:24

## 2024-04-28 RX ADMIN — LORAZEPAM 0.5 MG: 0.5 TABLET ORAL at 07:51

## 2024-04-28 RX ADMIN — OXYCODONE 2.5 MG: 5 TABLET ORAL at 07:52

## 2024-04-28 RX ADMIN — POLYETHYLENE GLYCOL 3350 17 G: 17 POWDER, FOR SOLUTION ORAL at 07:51

## 2024-04-28 RX ADMIN — ENOXAPARIN SODIUM 30 MG: 100 INJECTION SUBCUTANEOUS at 07:50

## 2024-04-28 RX ADMIN — NORTRIPTYLINE HYDROCHLORIDE 25 MG: 25 CAPSULE ORAL at 07:52

## 2024-04-28 RX ADMIN — SERTRALINE HYDROCHLORIDE 25 MG: 25 TABLET ORAL at 22:22

## 2024-04-28 RX ADMIN — SODIUM CHLORIDE, PRESERVATIVE FREE 10 ML: 5 INJECTION INTRAVENOUS at 07:50

## 2024-04-28 RX ADMIN — ONDANSETRON 4 MG: 2 INJECTION INTRAMUSCULAR; INTRAVENOUS at 18:47

## 2024-04-28 RX ADMIN — SODIUM CHLORIDE, PRESERVATIVE FREE 10 ML: 5 INJECTION INTRAVENOUS at 18:47

## 2024-04-28 RX ADMIN — SODIUM CHLORIDE, PRESERVATIVE FREE 10 ML: 5 INJECTION INTRAVENOUS at 22:22

## 2024-04-28 RX ADMIN — ACETAMINOPHEN 1000 MG: 325 TABLET ORAL at 12:05

## 2024-04-28 ASSESSMENT — PAIN DESCRIPTION - ORIENTATION
ORIENTATION: LOWER
ORIENTATION: LOWER

## 2024-04-28 ASSESSMENT — PAIN DESCRIPTION - PAIN TYPE
TYPE: ACUTE PAIN;SURGICAL PAIN
TYPE: ACUTE PAIN;SURGICAL PAIN

## 2024-04-28 ASSESSMENT — PAIN SCALES - GENERAL
PAINLEVEL_OUTOF10: 3
PAINLEVEL_OUTOF10: 7
PAINLEVEL_OUTOF10: 3

## 2024-04-28 ASSESSMENT — PAIN DESCRIPTION - FREQUENCY
FREQUENCY: INTERMITTENT
FREQUENCY: INTERMITTENT

## 2024-04-28 ASSESSMENT — PAIN DESCRIPTION - LOCATION
LOCATION: ABDOMEN

## 2024-04-28 ASSESSMENT — PAIN - FUNCTIONAL ASSESSMENT
PAIN_FUNCTIONAL_ASSESSMENT: PREVENTS OR INTERFERES SOME ACTIVE ACTIVITIES AND ADLS
PAIN_FUNCTIONAL_ASSESSMENT: PREVENTS OR INTERFERES SOME ACTIVE ACTIVITIES AND ADLS

## 2024-04-28 ASSESSMENT — PAIN DESCRIPTION - ONSET
ONSET: ON-GOING
ONSET: ON-GOING

## 2024-04-28 ASSESSMENT — PAIN DESCRIPTION - DESCRIPTORS
DESCRIPTORS: DISCOMFORT;ACHING;THROBBING
DESCRIPTORS: ACHING;DISCOMFORT;TENDER
DESCRIPTORS: ACHING;DISCOMFORT;TENDER

## 2024-04-29 VITALS
HEART RATE: 68 BPM | SYSTOLIC BLOOD PRESSURE: 123 MMHG | WEIGHT: 108.25 LBS | HEIGHT: 62 IN | OXYGEN SATURATION: 100 % | TEMPERATURE: 96.9 F | DIASTOLIC BLOOD PRESSURE: 64 MMHG | RESPIRATION RATE: 17 BRPM | BODY MASS INDEX: 19.92 KG/M2

## 2024-04-29 PROCEDURE — 6370000000 HC RX 637 (ALT 250 FOR IP): Performed by: STUDENT IN AN ORGANIZED HEALTH CARE EDUCATION/TRAINING PROGRAM

## 2024-04-29 PROCEDURE — 2580000003 HC RX 258: Performed by: STUDENT IN AN ORGANIZED HEALTH CARE EDUCATION/TRAINING PROGRAM

## 2024-04-29 PROCEDURE — 97530 THERAPEUTIC ACTIVITIES: CPT

## 2024-04-29 PROCEDURE — 6360000002 HC RX W HCPCS: Performed by: INTERNAL MEDICINE

## 2024-04-29 RX ADMIN — SODIUM CHLORIDE, PRESERVATIVE FREE 10 ML: 5 INJECTION INTRAVENOUS at 10:15

## 2024-04-29 RX ADMIN — LORAZEPAM 0.5 MG: 0.5 TABLET ORAL at 10:25

## 2024-04-29 RX ADMIN — DOCUSATE SODIUM 100 MG: 100 CAPSULE, LIQUID FILLED ORAL at 10:13

## 2024-04-29 RX ADMIN — ENOXAPARIN SODIUM 30 MG: 100 INJECTION SUBCUTANEOUS at 10:12

## 2024-04-29 RX ADMIN — NORTRIPTYLINE HYDROCHLORIDE 25 MG: 25 CAPSULE ORAL at 10:15

## 2024-04-29 ASSESSMENT — PAIN SCALES - GENERAL: PAINLEVEL_OUTOF10: 0

## 2024-04-29 NOTE — PLAN OF CARE
Problem: Safety - Adult  Goal: Free from fall injury  4/28/2024 2343 by Sydney Messina RN  Outcome: Progressing  4/28/2024 1116 by Carmenza Weller RN  Outcome: Progressing     Problem: Pain  Goal: Verbalizes/displays adequate comfort level or baseline comfort level  4/28/2024 2343 by Sydney Messina RN  Outcome: Progressing  4/28/2024 1116 by Carmenza Weller RN  Outcome: Progressing  Flowsheets (Taken 4/28/2024 0752)  Verbalizes/displays adequate comfort level or baseline comfort level:   Encourage patient to monitor pain and request assistance   Assess pain using appropriate pain scale   Administer analgesics based on type and severity of pain and evaluate response   Implement non-pharmacological measures as appropriate and evaluate response   Consider cultural and social influences on pain and pain management     Problem: Discharge Planning  Goal: Discharge to home or other facility with appropriate resources  Outcome: Progressing     Problem: Chronic Conditions and Co-morbidities  Goal: Patient's chronic conditions and co-morbidity symptoms are monitored and maintained or improved  Outcome: Progressing     Problem: Nutrition Deficit:  Goal: Optimize nutritional status  Outcome: Progressing     Problem: ABCDS Injury Assessment  Goal: Absence of physical injury  4/28/2024 2343 by Sydney Messina RN  Outcome: Progressing  4/28/2024 1116 by Carmenza Weller RN  Outcome: Progressing  Flowsheets (Taken 4/28/2024 0800)  Absence of Physical Injury: Implement safety measures based on patient assessment

## 2024-04-29 NOTE — PLAN OF CARE
Problem: Safety - Adult  Goal: Free from fall injury  4/29/2024 0758 by Sumeet Ortiz, RN  Outcome: Progressing     Problem: Discharge Planning  Goal: Discharge to home or other facility with appropriate resources  4/29/2024 0758 by Sumeet Ortiz, RN  Outcome: Progressing

## 2024-04-29 NOTE — PROGRESS NOTES
[facility] Medicine    Subjective:  pt alert anxious feels she is squirming out of her body  at bedside feels she can't stop shaking inside      Current Facility-Administered Medications:     oxyCODONE (ROXICODONE) immediate release tablet 2.5 mg, 2.5 mg, Oral, Q4H PRN, Greg Thomas MD, 2.5 mg at 04/28/24 0752    acetaminophen (TYLENOL) tablet 1,000 mg, 1,000 mg, Oral, 3 times per day, Greg Thomas MD, 1,000 mg at 04/27/24 2041    enoxaparin Sodium (LOVENOX) injection 30 mg, 30 mg, SubCUTAneous, Daily, Con Elam DO, 30 mg at 04/28/24 0750    LORazepam (ATIVAN) tablet 0.5 mg, 0.5 mg, Oral, BID PRN, Greg Thomas MD, 0.5 mg at 04/28/24 0751    docusate sodium (COLACE) capsule 100 mg, 100 mg, Oral, Daily, Greg Thomas MD, 100 mg at 04/28/24 0751    LORazepam (ATIVAN) tablet 1 mg, 1 mg, Oral, Nightly PRN, Greg Thomas MD, 1 mg at 04/27/24 2044    nortriptyline (PAMELOR) capsule 25 mg, 25 mg, Oral, Daily, Greg Thomas MD, 25 mg at 04/28/24 0752    polyethylene glycol (GLYCOLAX) packet 17 g, 17 g, Oral, Daily, Greg Thomas MD, 17 g at 04/28/24 0751    sertraline (ZOLOFT) tablet 25 mg, 25 mg, Oral, Nightly, Greg Thomas MD, 25 mg at 04/26/24 2100    sodium chloride flush 0.9 % injection 5-40 mL, 5-40 mL, IntraVENous, 2 times per day, Greg Thomas MD, 10 mL at 04/28/24 0750    sodium chloride flush 0.9 % injection 5-40 mL, 5-40 mL, IntraVENous, PRN, Greg Thomas MD    0.9 % sodium chloride infusion, , IntraVENous, PRN, Greg Thomas MD    potassium chloride (KLOR-CON M) extended release tablet 40 mEq, 40 mEq, Oral, PRN **OR** potassium bicarb-citric acid (EFFER-K) effervescent tablet 40 mEq, 40 mEq, Oral, PRN **OR** potassium chloride 10 mEq/100 mL IVPB (Peripheral Line), 10 mEq, IntraVENous, PRN, Greg Thomas MD, Last Rate: 100 mL/hr at 04/26/24 1015, 10 mEq at 04/26/24 1015    magnesium 
  Hospital Medicine    Subjective:  pt sched for surgery today pt states didn't sleep last pm      Current Facility-Administered Medications:     potassium chloride 10 mEq/100 mL IVPB (Peripheral Line), 10 mEq, IntraVENous, Q1H, Ranjana Alvarado MD, Last Rate: 100 mL/hr at 04/26/24 0514, 10 mEq at 04/26/24 0514    potassium bicarb-citric acid (EFFER-K) effervescent tablet 20 mEq, 20 mEq, Oral, Once, Ranjana Alvarado MD    LORazepam (ATIVAN) tablet 0.5 mg, 0.5 mg, Oral, BID PRN, Con Elam DO, 0.5 mg at 04/25/24 0802    ceFAZolin (ANCEF) 2,000 mg in sterile water 20 mL IV syringe, 2,000 mg, IntraVENous, On Call to OR, Lisa Rowland MD    indocyanine green (IC-GREEN) syringe 5 mg, 5 mg, IntraVENous, On Call to OR, Lisa Rowland MD    0.9 % sodium chloride infusion, , IntraVENous, Continuous, Con Elam DO, Last Rate: 100 mL/hr at 04/25/24 0804, New Bag at 04/25/24 0804    docusate sodium (COLACE) capsule 100 mg, 100 mg, Oral, Daily, Con Elam DO, 100 mg at 04/25/24 0809    LORazepam (ATIVAN) tablet 1 mg, 1 mg, Oral, Nightly PRN, Con Elam DO, 1 mg at 04/25/24 2004    nortriptyline (PAMELOR) capsule 25 mg, 25 mg, Oral, Daily, Con Elam DO, 25 mg at 04/25/24 0811    polyethylene glycol (GLYCOLAX) packet 17 g, 17 g, Oral, Daily, Con Elam DO, 17 g at 04/25/24 0804    sertraline (ZOLOFT) tablet 25 mg, 25 mg, Oral, Nightly, Con Elam DO, 25 mg at 04/25/24 2004    sodium chloride flush 0.9 % injection 5-40 mL, 5-40 mL, IntraVENous, 2 times per day, Con Elam DO, 10 mL at 04/25/24 2004    sodium chloride flush 0.9 % injection 5-40 mL, 5-40 mL, IntraVENous, PRN, Con Elam, DO    0.9 % sodium chloride infusion, , IntraVENous, PRN, Con Elam, DO    potassium chloride (KLOR-CON M) extended release tablet 40 mEq, 40 mEq, Oral, PRN **OR** potassium bicarb-citric acid (EFFER-K) effervescent tablet 40 mEq, 40 mEq, Oral, PRN **OR** potassium chloride 10 
  [facility] Medicine    Subjective:  pt alert conversive thuy diet      Current Facility-Administered Medications:     albuterol (PROVENTIL) (2.5 MG/3ML) 0.083% nebulizer solution 2.5 mg, 2.5 mg, Nebulization, Q6H PRN, Con Elam DO    oxyCODONE (ROXICODONE) immediate release tablet 2.5 mg, 2.5 mg, Oral, Q4H PRN, Greg Thomas MD, 2.5 mg at 04/28/24 0752    acetaminophen (TYLENOL) tablet 1,000 mg, 1,000 mg, Oral, 3 times per day, Greg Thomas MD, 1,000 mg at 04/28/24 1205    enoxaparin Sodium (LOVENOX) injection 30 mg, 30 mg, SubCUTAneous, Daily, Con Elam DO, 30 mg at 04/28/24 0750    LORazepam (ATIVAN) tablet 0.5 mg, 0.5 mg, Oral, BID PRN, Greg Thomas MD, 0.5 mg at 04/28/24 0751    docusate sodium (COLACE) capsule 100 mg, 100 mg, Oral, Daily, Greg Thomas MD, 100 mg at 04/28/24 0751    LORazepam (ATIVAN) tablet 1 mg, 1 mg, Oral, Nightly PRN, Greg Thomas MD, 1 mg at 04/28/24 2224    nortriptyline (PAMELOR) capsule 25 mg, 25 mg, Oral, Daily, Greg Thomas MD, 25 mg at 04/28/24 0752    polyethylene glycol (GLYCOLAX) packet 17 g, 17 g, Oral, Daily, Greg Thomas MD, 17 g at 04/28/24 0751    sertraline (ZOLOFT) tablet 25 mg, 25 mg, Oral, Nightly, Greg Thomas MD, 25 mg at 04/28/24 2222    sodium chloride flush 0.9 % injection 5-40 mL, 5-40 mL, IntraVENous, 2 times per day, Greg Thomas MD, 10 mL at 04/28/24 2222    sodium chloride flush 0.9 % injection 5-40 mL, 5-40 mL, IntraVENous, PRN, Greg Thomas MD, 10 mL at 04/28/24 1847    0.9 % sodium chloride infusion, , IntraVENous, PRN, Greg Thomas MD    potassium chloride (KLOR-CON M) extended release tablet 40 mEq, 40 mEq, Oral, PRN **OR** potassium bicarb-citric acid (EFFER-K) effervescent tablet 40 mEq, 40 mEq, Oral, PRN **OR** potassium chloride 10 mEq/100 mL IVPB (Peripheral Line), 10 mEq, IntraVENous, PRN, Greg Thomas MD, Last Rate: 
  OCCUPATIONAL THERAPY INITIAL EVALUATION    [facility]  [address], [place]        Date:2024                                                  Patient Name: Elisa Toth    MRN: [id number]    : 1955    Room: [address] [place] [address]      Evaluating OT: Babar Mckeon OTR/L; [id number]       Referring Provider: Con Elam DO     Specific Provider Orders/Date: OT Eval and Treat 24       Diagnosis: Intractable abdominal pain      Surgery: None this admission     Pertinent Medical History:  has a past medical history of Abnormal Pap smear of cervix, Anxiety, Chest pain, Detached retina, right, and IBS (irritable bowel syndrome).     Recommended Adaptive Equipment: TBD     Precautions:  Fall Risk, h/o anxiety     Assessment of current deficits    [x] Functional mobility  [x]ADLs  [x] Strength               [x]Cognition    [x] Functional transfers   [x] IADLs         [x] Safety Awareness   [x]Endurance    [] Fine Coordination              [x] Balance      [] Vision/perception   []Sensation     []Gross Motor Coordination  [] ROM  [] Delirium                   [] Motor Control     OT PLAN OF CARE   OT POC based on physician orders, patient diagnosis and results of clinical assessment    Frequency/Duration 1-3 days/wk for 2 weeks PRN   Specific OT Treatment Interventions to include:   * Instruction/training on adapted ADL techniques and AE recommendations to increase functional independence within precautions       * Training on energy conservation strategies, correct breathing pattern and techniques to improve independence/tolerance for self-care routine  * Functional transfer/mobility training/DME recommendations for increased independence, safety, and fall prevention  * Patient/Family education to increase follow through with safety techniques and functional independence  * Recommendation of environmental modifications for increased safety 
  [facility] Medicine    Subjective:  pt seen this am pod # 1 pt c/o abd pain / weakness      Current Facility-Administered Medications:     oxyCODONE (ROXICODONE) immediate release tablet 2.5 mg, 2.5 mg, Oral, Q4H PRN, Greg Thomas MD, 2.5 mg at 04/27/24 1155    acetaminophen (TYLENOL) tablet 1,000 mg, 1,000 mg, Oral, 3 times per day, Greg Thomas MD, 1,000 mg at 04/27/24 1359    enoxaparin Sodium (LOVENOX) injection 30 mg, 30 mg, SubCUTAneous, Daily, Con Elam DO, 30 mg at 04/27/24 0738    LORazepam (ATIVAN) tablet 0.5 mg, 0.5 mg, Oral, BID PRN, Greg Thomas MD, 0.5 mg at 04/27/24 1406    docusate sodium (COLACE) capsule 100 mg, 100 mg, Oral, Daily, Greg Thomas MD, 100 mg at 04/27/24 0744    LORazepam (ATIVAN) tablet 1 mg, 1 mg, Oral, Nightly PRN, Greg Thomas MD, 1 mg at 04/26/24 2100    nortriptyline (PAMELOR) capsule 25 mg, 25 mg, Oral, Daily, Greg Thomas MD, 25 mg at 04/27/24 0744    polyethylene glycol (GLYCOLAX) packet 17 g, 17 g, Oral, Daily, Greg Thomas MD, 17 g at 04/27/24 0745    sertraline (ZOLOFT) tablet 25 mg, 25 mg, Oral, Nightly, Greg Thomas MD, 25 mg at 04/26/24 2100    sodium chloride flush 0.9 % injection 5-40 mL, 5-40 mL, IntraVENous, 2 times per day, Greg Thomas MD, 10 mL at 04/27/24 0738    sodium chloride flush 0.9 % injection 5-40 mL, 5-40 mL, IntraVENous, PRN, Greg Thomas MD    0.9 % sodium chloride infusion, , IntraVENous, PRN, rGeg Thomas MD    potassium chloride (KLOR-CON M) extended release tablet 40 mEq, 40 mEq, Oral, PRN **OR** potassium bicarb-citric acid (EFFER-K) effervescent tablet 40 mEq, 40 mEq, Oral, PRN **OR** potassium chloride 10 mEq/100 mL IVPB (Peripheral Line), 10 mEq, IntraVENous, PRN, Greg Thomas MD, Last Rate: 100 mL/hr at 04/26/24 1015, 10 mEq at 04/26/24 1015    magnesium sulfate 2000 mg in 50 mL IVPB premix, 2,000 mg, IntraVENous, 
4 Eyes Skin Assessment     NAME:  Elisa Toth  YOB: 1955  MEDICAL RECORD NUMBER:  [id number]    The patient is being assessed for  Admission    I agree that at least one RN has performed a thorough Head to Toe Skin Assessment on the patient. ALL assessment sites listed below have been assessed.      Areas assessed by both nurses:    Head, Face, Ears, Shoulders, Back, Chest, Arms, Elbows, Hands, Sacrum. Buttock, Coccyx, Ischium, Legs. Feet and Heels, Under Medical Devices , and Other ..        Does the Patient have a Wound? No noted wound(s)       Edison Prevention initiated by RN: Yes  Wound Care Orders initiated by RN: No    Pressure Injury (Stage 3,4, Unstageable, DTI, NWPT, and Complex wounds) if present, place Wound referral order by RN under : No    New Ostomies, if present place, Ostomy referral order under : No     Nurse 1 eSignature: Electronically signed by Sumeet Ortiz RN on 4/27/24 at 4:28 PM EDT    **SHARE this note so that the co-signing nurse can place an eSignature**    Nurse 2 eSignature: Electronically signed by Martina Sidhu RN on 4/27/24 at 4:32 PM EDT  
4 Eyes Skin Assessment     NAME:  Elisa Toth  YOB: 1955  MEDICAL RECORD NUMBER:  [id number]    The patient is being assessed for  Admission    I agree that at least one RN has performed a thorough Head to Toe Skin Assessment on the patient. ALL assessment sites listed below have been assessed.      Areas assessed by both nurses:    Head, Face, Ears, Shoulders, Back, Chest, Arms, Elbows, Hands, Sacrum. Buttock, Coccyx, Ischium, and Legs. Feet and Heels        Does the Patient have a Wound? No noted wound(s)       Edison Prevention initiated by RN: No  Wound Care Orders initiated by RN: No    Pressure Injury (Stage 3,4, Unstageable, DTI, NWPT, and Complex wounds) if present, place Wound referral order by RN under : No    New Ostomies, if present place, Ostomy referral order under : No     Nurse 1 eSignature: Electronically signed by Haven Curran RN on 4/24/24 at 7:25 PM EDT    **SHARE this note so that the co-signing nurse can place an eSignature**    Nurse 2 eSignature: {Esignature:[id number]}   
CLINICAL PHARMACY NOTE: MEDS TO BEDS    Total # of Prescriptions Filled: 2   The following medications were delivered to the patient:  Oxycodone 5 mg  Ondansetron 4 mg odt    Additional Documentation:  Pt declined senexon-s 8.6/50 mg, pt stated she had it at home. Pt.  picked up in pharmacy  
Called to patients room for a aerosol treatment.  Patient stated that is not what she needed and dont know why they said she wanted on .   
DVT Prophylaxis Adjustment Policy (DVT Prophylaxis)     This patient is on DVT Prophylaxis medication that requires a dose adjustment      Date Body Weight IBW  Adjusted BW SCr  CrCl Dialysis status   4/26/2024   Ideal body weight: 53.9 kg (118 lb 11.8 oz) Serum creatinine: 0.5 mg/dL 04/26/24 0401  Estimated creatinine clearance: 77 mL/min N/a       Pharmacy has dose-adjusted the DVT Prophylaxis regimen to match   the recommendations from the following table        Ordered Medication:Lovenox 40mg daily    Order Changed/converted to: Lovenox 30mg daily      These changes were made per protocol according to the [facility] Pharmacist   Review for Appropriate Use and Automatic Dose Adjustments of   Subcutaneous Anticoagulants Policy     *Please note this dose may need readjusted if patient's condition changes.    Please contact pharmacy with any questions regarding these changes.    Carmen Wright [facility]  4/26/2024  4:39 PM    
HEPATOBILIARY AND PANCREATIC SURGERY  DAILY PROGRESS NOTE  4/25/2024    Chief Complaint   Patient presents with    Abdominal Pain     Reports that she is having gallbladder flair ups since February and reports that she is having pains of the left upper quad radiating into her left chest. Report that she wants her gallbladder out. Reports that she cannot get out of bed and she just shakes.        Subjective:  Was sleeping upon evaluation this AM but awoke to stimuli. States she has been having point tenderness along her chest wall and some SOB but no changes in her abdominal pain.     Objective:  /64   Pulse 60   Temp 97.4 °F (36.3 °C) (Temporal)   Resp 18   SpO2 100%     GENERAL:  anxious appearing but non toxic   HEAD: Normocephalic, atraumatic  EYES: No sclera icterus, pupils equal  LUNGS:  No increased work of breathing  CARDIOVASCULAR:  RR  ABDOMEN:  Soft, non-tender, non-distended  PSYCH: anxious affect     Assessment/Plan:  Patient is a 69-year-old female with no prior abdominal surgeries  presenting with persistent complaints of epigastric pain and inability to tolerate p.o. intake. HIDA with EF 12%     - will plan for robotic assisted laparoscopic cholecystectomy 4/26   - diet as tolerated today   - labs ordered     DW Dr. Rowland     Electronically signed by Ranjana Alvarado MD on 4/25/2024 at 8:14 AM     Attending Physician Statement:    Chief Complaint:   Chief Complaint   Patient presents with    Abdominal Pain     Reports that she is having gallbladder flair ups since February and reports that she is having pains of the left upper quad radiating into her left chest. Report that she wants her gallbladder out. Reports that she cannot get out of bed and she just shakes.        I have examined the patient and performed the key aspects of physical exam, reviewed the record (including all pertinent and new radiology images and laboratory findings), and discussed the case with the surgical team.  I agree with 
HEPATOBILIARY AND PANCREATIC SURGERY  DAILY PROGRESS NOTE  4/26/2024    Chief Complaint   Patient presents with    Abdominal Pain     Reports that she is having gallbladder flair ups since February and reports that she is having pains of the left upper quad radiating into her left chest. Report that she wants her gallbladder out. Reports that she cannot get out of bed and she just shakes.        Subjective:  Resting comfortably this AM.     Objective:  /64   Pulse 71   Temp 97 °F (36.1 °C) (Temporal)   Resp 16   SpO2 100%     GENERAL:  anxious appearing but non toxic   HEAD: Normocephalic, atraumatic  EYES: No sclera icterus, pupils equal  LUNGS:  No increased work of breathing  CARDIOVASCULAR:  RR  ABDOMEN:  Soft, non-tender, non-distended  PSYCH: anxious affect     Assessment/Plan:  Patient is a 69-year-old female with biliary dyskinesia, gallbladder EF 12% on HIDA     - will plan for robotic assisted laparoscopic cholecystectomy today 4/26   -  ancef on call, IC-Green on call to OR     DW Dr. Rowland     Electronically signed by Ranjana Alvarado MD on 4/26/2024 at 7:31 AM     Attending Physician Statement:    Chief Complaint:   Chief Complaint   Patient presents with    Abdominal Pain     Reports that she is having gallbladder flair ups since February and reports that she is having pains of the left upper quad radiating into her left chest. Report that she wants her gallbladder out. Reports that she cannot get out of bed and she just shakes.        I have examined the patient and performed the key aspects of physical exam, reviewed the record (including all pertinent and new radiology images and laboratory findings), and discussed the case with the surgical team.  I agree with the assessment and plan with the following additions, corrections, and changes. 14pt review of symptoms completed and negative except as mentioned.    Agree. OR today     Lisa Rowland MD  04/26/24  9:54 AM    
HEPATOBILIARY AND PANCREATIC SURGERY  DAILY PROGRESS NOTE  4/27/2024    Chief Complaint   Patient presents with    Abdominal Pain     Reports that she is having gallbladder flair ups since February and reports that she is having pains of the left upper quad radiating into her left chest. Report that she wants her gallbladder out. Reports that she cannot get out of bed and she just shakes.        Subjective:  Resting comfortably this AM. Complaining of some incisional pain/discomfort. Otherwise no N/V.   Objective:  /66   Pulse 63   Temp 97.5 °F (36.4 °C) (Temporal)   Resp 16   SpO2 98%     GENERAL:  anxious appearing but non toxic   HEAD: Normocephalic, atraumatic  EYES: No sclera icterus, pupils equal  LUNGS:  No increased work of breathing  CARDIOVASCULAR:  RR  ABDOMEN:  Soft, non-tender, non-distended. Incisions C/D/I.   PSYCH: anxious affect     Assessment/Plan:  Patient is a 69-year-old female with biliary dyskinesia, gallbladder EF 12% on HIDA     - Ok for low fat regular diet   -  pain and nausea control prn   - Ok for dc today if tolerates diet and is not having uncontrolled pain    Electronically signed by Iva Garcia DO on 4/27/2024 at 7:15 AM     Patient has some incisional pain  Advance diet  Okay to discharge from our standpoint    Electronically signed by Nagi Cohen MD on 4/27/2024 at 10:46 AM    
HEPATOBILIARY AND PANCREATIC SURGERY  DAILY PROGRESS NOTE  4/28/2024    Chief Complaint   Patient presents with    Abdominal Pain     Reports that she is having gallbladder flair ups since February and reports that she is having pains of the left upper quad radiating into her left chest. Report that she wants her gallbladder out. Reports that she cannot get out of bed and she just shakes.        Subjective:  Doing well this morning. Tolerating diet, decreasing incisional pain     Objective:  /70   Pulse 77   Temp 97.8 °F (36.6 °C) (Temporal)   Resp 18   Ht 1.575 m (5' 2\")   Wt 49.1 kg (108 lb 3.9 oz)   SpO2 100%   BMI 19.80 kg/m²     GENERAL:  anxious appearing but non toxic   HEAD: Normocephalic, atraumatic  EYES: No sclera icterus, pupils equal  LUNGS:  No increased work of breathing  CARDIOVASCULAR:  RR  ABDOMEN:  Soft, non-tender, non-distended. Incisions C/D/I.   PSYCH: anxious affect     Assessment/Plan:  Patient is a 69-year-old female with biliary dyskinesia, gallbladder EF 12% on HIDA     - continue low fat regular diet   -  pain and nausea control prn   - PT/OT today for possible [facility] vs home with [facility]  - okay to DC from our POV    Electronically signed by Gerald Lucas DO on 4/28/2024 at 6:50 AM   
Note from EDILIA Aparicio noted. Entered patient room to discuss. She reports she is still having SOB and \"feels weird,\" but confirms she does not want a breathing treatment now. Sister that also had requested pt receive is no longer at bedside. Patient reporting nausea, PRN Zofran given (see eMAR).    Electronically signed by Carmenza Weller RN on 4/28/2024 at 6:54 PM    
Nurse handoff called to MARI Mack for anticipated transfer to [facility].    Patient updated on anticipated transfer to [facility]. She notified family member via telephone. Belongings packed, including: clothing, shoes, cell phone, purse.    Transport request placed.    Electronically signed by Carmenza Weller RN on 4/27/2024 at 2:49 PM    
OCCUPATIONAL THERAPY TREATMENT NOTE    [facility]  OT BEDSIDE TREATMENT NOTE      Date:2024  Patient Name: Elisa Toth  MRN: [id number]  : 1955  Room: [address] [place] [address]     Per OT Eval:  Evaluating OT: Babar Mckeon OTR/L; [id number]        Referring Provider: Con Elam DO     Specific Provider Orders/Date: OT Eval and Treat 24        Diagnosis: Intractable abdominal pain      Surgery: None this admission     Pertinent Medical History:  has a past medical history of Abnormal Pap smear of cervix, Anxiety, Chest pain, Detached retina, right, and IBS (irritable bowel syndrome).      Recommended Adaptive Equipment: TBD      Precautions:  Fall Risk, h/o anxiety      Assessment of current deficits    [x] Functional mobility            [x]ADLs           [x] Strength                  [x]Cognition    [x] Functional transfers          [x] IADLs         [x] Safety Awareness   [x]Endurance    [] Fine Coordination                         [x] Balance      [] Vision/perception   []Sensation      []Gross Motor Coordination             [] ROM           [] Delirium                   [] Motor Control      OT PLAN OF CARE   OT POC based on physician orders, patient diagnosis and results of clinical assessment     Frequency/Duration 1-3 days/wk for 2 weeks PRN   Specific OT Treatment Interventions to include:   * Instruction/training on adapted ADL techniques and AE recommendations to increase functional independence within precautions       * Training on energy conservation strategies, correct breathing pattern and techniques to improve independence/tolerance for self-care routine  * Functional transfer/mobility training/DME recommendations for increased independence, safety, and fall prevention  * Patient/Family education to increase follow through with safety techniques and functional independence  * Recommendation of environmental modifications for increased safety with functional 
Patient came to surgical suite with tele monitor, will return to recovery with patient.  
Patient reports SOB. Family at bedside is asking if she can have a breathing treatment. Patient agreeing at this time.    Electronically signed by Carmenza Weller RN on 4/28/2024 at 6:38 PM    
Patient reports \"I'm having withdrawals from Ativan.\" Re-educated patient that she has been receiving the Ativan while inpatient, she states she knows, but is having \"cravings for Ativan.\" She is requesting a psychiatrist see her, family member confirms that they think she needs to be seen by psych.     Electronically signed by Carmenza Weller RN on 4/28/2024 at 11:25 AM    
Physical Therapy  Rx    Name: Elisa Toth  : 1955  MRN: [id number]      Date of Service: 2024    Evaluating PT:  Nisha Rivers PT, DPT [id number]    Room #:  5416/5416-B  Diagnosis:  SMAS (superior mesenteric artery syndrome) ([facility]) [K55.1]  Failure to thrive in adult [R62.7]  PMHx/PSHx:   has a past medical history of Abnormal Pap smear of cervix, Anxiety, Chest pain, Detached retina, right, and IBS (irritable bowel syndrome).  Precautions:  Fall risk  Equipment Needs:  None    SUBJECTIVE:    Pt lives with her  in a 1 story home with 2 steps to enter and no rail(s). Pt ambulated with no AD PTA.    OBJECTIVE:   Initial Evaluation  Date: 24 Treatment  24 Short Term/ Long Term   Goals   AM-PAC 6 Clicks     Was pt agreeable to Eval/treatment? Yes yes    Does pt have pain? Global muscular soreness (not quantified)     Bed Mobility  Rolling: NT  Supine to sit: SBA  Sit to supine: NT  Scooting: SBA Rolling: Ind  Supine to sit:   Ind   Sit to supine: Ind   Scooting: Ind     Rolling: Independent  Supine to sit: Independent  Sit to supine: Independent  Scooting: Independent   Transfers Sit to stand: SBA  Stand to sit: SBA  Stand pivot: SBA with no AD Sit to stand: SBA   Stand to sit: SBA  Stand pivot: SBA no LOB  Sit to stand: Independent  Stand to sit: Independent  Stand pivot: Independent with no AD   Ambulation   150 feet with no AD and SBA <> Jose  100 feet with no device gait pattern inconsistent however no LOB SBA  >400 feet with no AD Independent   Stair negotiation: ascended and descended  NT NT >2 steps with no rail Independent   ROM BUE:  Refer to OT  BLE:  WFL     Strength BUE:  Refer to OT  BLE:  Not formally assessed     Balance Sitting EOB:  Supervision  Dynamic Standing:  SBA with no AD  Sitting EOB:  Independent  Dynamic Standing:  Independent with no AD     Pt is A & O x 4  Sensation:  Pt denies numbness and tingling to extremities  Edema:  Unremarkable     Patient 
Pt is seen by psychiatry, no recom and further order noted. Dc instruction/papers given to pt/. Understood. Peripheral IV line removed. Arranged for a wheelchair to send pt down to main lobby. Home meds are picked up by  at pharmacy.  
Renal Dose Adjustment Policy (Generic)     This patient is on medication that requires renal, weight, and/or indication dose adjustment.      Date Body Weight IBW  Adjusted BW SCr  CrCl Dialysis status   4/24/2024   Ideal body weight: 53.9 kg (118 lb 11.8 oz) Serum creatinine: 0.9 mg/dL 04/24/24 0845  Estimated creatinine clearance: 43 mL/min N/a       Pharmacy has dose-adjusted the following medication(s):    Date Previous Order Adjusted Order   4/24/2024 Pepcid 20 mg bid Pepcid 20 mg daily       These changes were made per protocol according to the [facility]   Automatic Renal Dose Adjustment Policy.     *Please note this dose may need readjusted if patient's condition changes.    Please contact pharmacy with any questions regarding these changes.    Sue Garcia RPH  4/24/2024  6:59 PM    
This RN received a call from outpt pharmacy the pt refused Senokot tabs to take home. Pharmacy will not dispense.  
activities 13197 0 minutes  [] Therapeutic exercises 09085 0 minutes  [] Neuromuscular reeducation 57950 0 minutes     Nisha Rivers, PT, DPT  [id number]

## 2024-04-29 NOTE — CONSULTS
Department of Psychiatry  Behavioral Health Consult    REASON FOR CONSULT: Patient is anxious, feels she is squirming out of her body    CONSULTING PHYSICIAN: Dr. Elam    History obtained from: Patient    HISTORY OF PRESENT ILLNESS:    The patient is a 69 y.o. female, , retired  with significant past psychiatric history of brief psychotic disorder in 2023 and mood disorder with past medical medical history significant for biliary dyskinesia s/p lap tati 4/26/24. We were consulted after patient reported feeling that she is squirming out of her body and that  at bedside felt she could not stop shaking inside on 4/28/24. Per nursing note the same day, patient had reported concern of Ativan withdrawal, though she was educated that she has been receiving the Ativan.     Upon evaluation today, the patient is pleasant but tired-appearing. She reports that for the last week that she has been having episodes of full body shaking/spasms that last seconds and occur approximately 3-4 times an hour. She states, \"I get jumpy. I think it is some physical problem. I have these spasms of my whole body that just come.\" She says that after these spasms her muscles still feel stiff. She denies having any pain from the spasms. She denies loss of consciousness with these episodes; she also denies loss of bowel or bladder control. Per MAR patient is receiving the ativan; she denies tremors, feelings of significant anxiety, or diaphoresis. She says that she has been feeling symptoms of anxiety and depression for the past couple months, with her health being a significant contributing factor to her anxiety and her biggest stressor. She reports being on ativan for about 4-6 weeks to help with sleep and that she was started on zoloft about 2 weeks ago. She says that she is feeling overall weak still from her surgery. She endorses decreased sleep, lack of interest, decreased energy (even before 
retina, right 08/17/2010,10/29/10    surgery, Dr. Euceda [facility]     IBS (irritable bowel syndrome)        Past Surgical History:   Procedure Laterality Date    CATARACT REMOVAL  07/17/11    also had scar tissue removed at this time from prev retinal surgeries    LEEP      TONSILLECTOMY AND ADENOIDECTOMY         Current Facility-Administered Medications   Medication Dose Route Frequency Provider Last Rate Last Admin    0.9 % sodium chloride infusion   IntraVENous Continuous Rj Bartlett MD         Current Outpatient Medications   Medication Sig Dispense Refill    metoprolol succinate (TOPROL XL) 25 MG extended release tablet Take 0.5 tablets by mouth daily 30 tablet 0    sertraline (ZOLOFT) 25 MG tablet Take 1 tablet by mouth at bedtime 30 tablet 0    docusate sodium (COLACE, DULCOLAX) 100 MG CAPS Take 100 mg by mouth daily 30 capsule 0    famotidine (PEPCID) 20 MG tablet Take 1 tablet by mouth 2 times daily 60 tablet 0    polyethylene glycol (GLYCOLAX) 17 g packet Take 1 packet by mouth daily 30 each 0    LORazepam (ATIVAN) 0.5 MG tablet Take 2 tablets by mouth nightly as needed for Anxiety for up to 30 days. Max Daily Amount: 1 mg 60 tablet 0    nortriptyline (PAMELOR) 25 MG capsule Take 1 capsule by mouth daily         Allergies   Allergen Reactions    Xanax Xr [Alprazolam Er] Shortness Of Breath    Codeine      Felt like she was going to pass out.     Dilaudid [Hydromorphone Hcl] Dizziness or Vertigo       Family History   Problem Relation Age of Onset    High Cholesterol Mother     Cancer Maternal Uncle         colon    Diabetes Maternal Grandmother     High Blood Pressure Maternal Grandmother     Cancer Maternal Uncle         colon       Social History     Socioeconomic History    Marital status:      Spouse name: Not on file    Number of children: Not on file    Years of education: Not on file    Highest education level: Not on file   Occupational History    Not on file   Tobacco Use    Smoking status:

## 2024-04-29 NOTE — CARE COORDINATION
04/26/24 CM Update:  Pt admitted for OBS for abdominal pain Surgery consulted plan is for cholecystectomy today Pt is mostly independent with ADLs and lives with her  Plan is to return home with [facility] for PT/OT /SN and aide they have accepted pt Orders are on chart  or daughter will transport at discharge CM/SW to follow Electronically signed by Toño Blunt RN on 4/26/2024 at 8:16 AM   
4/29/2024Social work transition of care planning  Sw followed up with pt at bedside. Pt plan is home,pt will call for a ride at ID.  Electronically signed by DUGLAS Costello on 4/29/2024 at 11:13 AM    
Giver: Self  Patient Support Systems include: Spouse/Significant Other, Children, Family Members   Current Financial resources:    Current community resources:    Current services prior to admission: None            Current DME:              Type of Home Care services:  None    ADLS  Prior functional level: Assistance with the following:, Housework, Shopping  Current functional level: Assistance with the following:, Housework, Shopping    PT AM-PAC: 16 /24  OT AM-PAC: 19 /24    Family can provide assistance at DC: Yes  Would you like Case Management to discuss the discharge plan with any other family members/significant others, and if so, who? Yes  Plans to Return to Present Housing: Yes  Other Identified Issues/Barriers to RETURNING to current housing: yes  Potential Assistance needed at discharge: [facility]            Potential DME:    Patient expects to discharge to: House  Plan for transportation at discharge:      Financial    Payor: [employer] / Plan: [employer] MEDIBLUE ESSENTIAL/PLUS / Product Type: *No Product type* /     Does insurance require precert for SNF: Yes    Potential assistance Purchasing Medications:    Meds-to-Beds request:        [id number] - [facility] [address] - P [phone] - F [phone]  [address] [address]  Phone: [phone] Fax: [phone]    [facility] [address] [facility] [phone] - F [phone]  [address] [facility] [address]  Phone: [phone] Fax: [phone]    [id number] - [place], [place] - [address] [place] [address] - P [phone] - F [phone]  [address] [place] [address]  Phone: [phone] Fax: [phone]      Notes:    Factors facilitating achievement of predicted outcomes: Family support    Barriers to discharge: none    Additional Case Management Notes: see notes    The Plan for Transition of Care is related to the [facility]

## 2024-04-29 NOTE — PLAN OF CARE
Problem: Safety - Adult  Goal: Free from fall injury  4/29/2024 1042 by Sumeet Ortiz RN  Outcome: Adequate for Discharge     Problem: Pain  Goal: Verbalizes/displays adequate comfort level or baseline comfort level  4/29/2024 1042 by Sumeet Ortiz RN  Outcome: Adequate for Discharge     Problem: Discharge Planning  Goal: Discharge to home or other facility with appropriate resources  4/29/2024 1042 by Sumeet Ortiz RN  Outcome: Adequate for Discharge     Problem: Chronic Conditions and Co-morbidities  Goal: Patient's chronic conditions and co-morbidity symptoms are monitored and maintained or improved  4/29/2024 1042 by Sumeet Ortiz RN  Outcome: Adequate for Discharge     Problem: Nutrition Deficit:  Goal: Optimize nutritional status  4/29/2024 1042 by Sumeet Ortiz RN  Outcome: Adequate for Discharge     Problem: ABCDS Injury Assessment  Goal: Absence of physical injury  4/29/2024 1042 by Sumeet Ortiz RN  Outcome: Adequate for Discharge

## 2024-04-30 ENCOUNTER — TELEPHONE (OUTPATIENT)
Dept: HEMATOLOGY | Age: 69
End: 2024-04-30

## 2024-04-30 NOTE — TELEPHONE ENCOUNTER
I called and spoke to the patients  to let him know that we were cancelling the patients appt for 05/03/2024 to 05/08/2024 at 8:30 am VANI, due to Dr Rowland being the physician who did her surgery. The patients  confirmed the new date and time  Electronically signed by Vida Luke MA on 4/30/2024 at 3:09 PM

## 2024-05-01 ENCOUNTER — CARE COORDINATION (OUTPATIENT)
Dept: CARE COORDINATION | Age: 69
End: 2024-05-01

## 2024-05-01 DIAGNOSIS — R62.7 FAILURE TO THRIVE IN ADULT: Primary | ICD-10-CM

## 2024-05-01 LAB — SURGICAL PATHOLOGY REPORT: NORMAL

## 2024-05-01 PROCEDURE — 1111F DSCHRG MED/CURRENT MED MERGE: CPT | Performed by: FAMILY MEDICINE

## 2024-05-01 NOTE — CARE COORDINATION
Care Transitions Initial Follow Up Call    Call within 2 business days of discharge: Yes    Patient Current Location:  Home: [address] [place] [address] [place] [address]    Care Transition Nurse contacted the spouse/partner by telephone to perform post hospital discharge assessment. Verified name and  with spouse/partner as identifiers. Provided introduction to self, and explanation of the Care Transition Nurse role.     Patient: Elisa Toth Patient : 1955   MRN: [id number]  Reason for Admission: 2024 - 2024 [facility] IP. Intractable abdominal pain, FTT, s/p Lap Cholecystectomy.  Discharge Date: 24 RARS: Readmission Risk Score: 26.5  Readmit  CT    [facility]     Lisa Rowland MD (General Surgery) post-op  8:30  Next PCP 5/15 9:45 (declines sooner appt)    START taking:  ondansetron (ZOFRAN-ODT)  oxyCODONE (Roxicodone)  sennosides-docusate sodium (SENOKOT-S)  STOP taking:  metoprolol succinate 25 MG extended release  tablet (TOPROL XL)  Last Discharge Facility       Date Complaint Diagnosis Description Type Department Provider    24 Abdominal Pain Failure to thrive in adult ... ED to Hosp-Admission (Discharged) (ADMITTED) [facility] Con Elam, DO; Shahana Emmanuel..            Was this an external facility discharge? No Discharge Facility: [facility]    Challenges to be reviewed by the provider   Additional needs identified to be addressed with provider: No  none               Method of communication with provider: none.    CTN spoke w/ spouse/Haider. States pt has been seen by [facility]. He shares pt is weak and having abd pain rated 8/10 w/ abd cramping. Has not taken any pain medication. Advised to take oxycodone 5 mg 1/2 tablet Q 6 hr as needed for pain control, v/u. No N/V and has zofran if needed. States very poor appetite and has \"the shakes\". No fevers currently. Drinking Boost. Enc blander food bites as tolerated and RD referral placed via staff message for diet

## 2024-05-02 ENCOUNTER — CARE COORDINATION (OUTPATIENT)
Dept: CARE COORDINATION | Age: 69
End: 2024-05-02

## 2024-05-02 NOTE — CARE COORDINATION
RD received referral from Mackenzie BARAKAT:    Maria T, Pt has very poor appetite (not new but progressive). States drinks some Boost. Failure to thrive and new Lap cholecystectomy. Needs dietary support/education. Thank you.  //SP, RN ROSHNI    RD contacted Elisa Janey and left voicemail regarding Dietitian referral. Left call back number and will follow up as appropriate.       Maria T Suazo RDN, LD  [phone]

## 2024-05-03 ENCOUNTER — CARE COORDINATION (OUTPATIENT)
Dept: CARE COORDINATION | Age: 69
End: 2024-05-03

## 2024-05-03 RX ORDER — SERTRALINE HYDROCHLORIDE 25 MG/1
25 TABLET, FILM COATED ORAL NIGHTLY
Qty: 30 TABLET | Refills: 0 | Status: SHIPPED | OUTPATIENT
Start: 2024-05-03

## 2024-05-03 RX ORDER — FAMOTIDINE 20 MG/1
20 TABLET, FILM COATED ORAL 2 TIMES DAILY
Qty: 60 TABLET | Refills: 0 | Status: SHIPPED | OUTPATIENT
Start: 2024-05-03

## 2024-05-03 NOTE — CARE COORDINATION
Contacted Elisa Toth regarding Dietitian referral. Pt answered, RD explained reason for call and role in care. Patient declined nutrition assessment/education at this time. Per chart review, patient was inpatient at [facility] from 4/9/24-4/14/24. Patient was seen inpatient by RD on 4/10/24 for unintentional weight loss. Patient's current nutrition recommendations include:    Nutrition Recommendations/Plan:   Continue current diet as tolerated & monitor (ADULT DIET; Regular)  Will add Ensure with all meals, chocolate per pt preference      RD offered to mail educational handouts and Ensure coupons, patient accepted. RD verified address. RD will mail High Calorie High Protein Nutrition Therapy, Tips for Adding Protein and Ensure coupons. RD will outreach in 2-3 weeks to follow up and answer any nutrition related questions at this time.    Maria T Suazo RDN, LD  [phone]

## 2024-05-06 ENCOUNTER — TELEPHONE (OUTPATIENT)
Dept: FAMILY MEDICINE CLINIC | Age: 69
End: 2024-05-06

## 2024-05-06 NOTE — TELEPHONE ENCOUNTER
----- Message from Rowan Clemente sent at 5/6/2024  9:36 AM EDT -----  Subject: Message to Provider    QUESTIONS  Information for Provider? Patient, Elisa Toth called to informing not   to let her , Haider schedule an appointment for her.   ---------------------------------------------------------------------------  --------------  CALL BACK INFO  [phone]; OK to leave message on voicemail  ---------------------------------------------------------------------------  --------------  SCRIPT ANSWERS  Relationship to Patient? Self

## 2024-05-07 ENCOUNTER — TELEPHONE (OUTPATIENT)
Dept: HEMATOLOGY | Age: 69
End: 2024-05-07

## 2024-05-07 NOTE — TELEPHONE ENCOUNTER
Patient called in and cancelled her appt for 5/8/24 stating they have a family situation and will call back at a later time to reschedule.    Electronically signed by Rosario Lindsay RN on 5/7/2024 at 10:51 AM

## 2024-05-09 ENCOUNTER — CARE COORDINATION (OUTPATIENT)
Dept: CARE COORDINATION | Age: 69
End: 2024-05-09

## 2024-05-09 NOTE — CARE COORDINATION
Care Transitions Follow Up Call    Patient Current Location:  Home: [facility] [address] [place] [address]    Care Transition Nurse contacted the patient by telephone to follow up after admission.  Verified name and  with patient as identifiers.    Patient: Elisa Toth  Patient : 1955   MRN: [id number]  [facility] Ad[facility]:  [facility] Int[facility] pain, FTT, s/p Lap Cholecystectomy.   Discharge Date: 24 RARS: Readmission Risk Score: 26.5  Readmit  CT     [facility]      Lisa Rowland MD (General Surgery) post-op  8:30. Canceled.  Next PCP 5/15 9:45     Needs to be reviewed by the provider   Additional needs identified to be addressed with provider: No  none             Method of communication with provider: none.    CTN spoke w/ Elisa. Pt canceled her post-op and enc to reschedule, v/u. States her [facility] nurse said her incisions look \"well\". No abd pain, incisional pain, N/V, GI intolerance. States her appetite is \"pretty good\" and is hydrating. No elimination concerns. Has/taking meds as directed. States she is up and active.     Follow Up  Future Appointments   Date Time Provider Department Center   5/15/2024  9:45 AM Carrie Neal DO [facility]     Care Transition Nurse reviewed red flags with patient and discussed any barriers to care and/or understanding of plan of care after discharge. Discussed appropriate site of care based on symptoms and resources available to patient including: Condition related references. The patient agrees to contact the PCP office for questions related to their healthcare.     Advance Care Planning:   Unchanged .      Care Transitions Subsequent and Final Call    Subsequent and Final Calls  Do you have any ongoing symptoms?: No  Have your medications changed?: No  Do you have any questions related to your medications?: No  Do you currently have any active services?: Yes  Are you currently active

## 2024-05-15 ENCOUNTER — OFFICE VISIT (OUTPATIENT)
Dept: FAMILY MEDICINE CLINIC | Age: 69
End: 2024-05-15
Payer: MEDICARE

## 2024-05-15 VITALS
RESPIRATION RATE: 17 BRPM | OXYGEN SATURATION: 99 % | HEIGHT: 62 IN | SYSTOLIC BLOOD PRESSURE: 99 MMHG | DIASTOLIC BLOOD PRESSURE: 58 MMHG | BODY MASS INDEX: 15.59 KG/M2 | WEIGHT: 84.7 LBS | HEART RATE: 102 BPM | TEMPERATURE: 97.5 F

## 2024-05-15 DIAGNOSIS — R42 DIZZINESS: Primary | ICD-10-CM

## 2024-05-15 DIAGNOSIS — F41.9 ANXIETY: ICD-10-CM

## 2024-05-15 PROCEDURE — 1123F ACP DISCUSS/DSCN MKR DOCD: CPT | Performed by: FAMILY MEDICINE

## 2024-05-15 PROCEDURE — 99213 OFFICE O/P EST LOW 20 MIN: CPT | Performed by: FAMILY MEDICINE

## 2024-05-15 RX ORDER — CYPROHEPTADINE HYDROCHLORIDE 4 MG/1
4 TABLET ORAL 3 TIMES DAILY
Qty: 90 TABLET | Refills: 1 | Status: SHIPPED | OUTPATIENT
Start: 2024-05-15

## 2024-05-15 NOTE — PROGRESS NOTES
5/15/2024    Chief Complaint   Patient presents with    Anxiety    Gastroesophageal Reflux    Fatigue     Feeling weak for a while now.     Rib Pain     Left rib pain; spasm        HPI    Elisa Toth is a 69 y.o. patient that presents today for:  Anxiety and fatigue. Poor appetite. Is not keeping visits with psych. Does deny homicidal or suicidal ideations.        Patient's past medical, surgical, social and/or family history reviewed, updated in chart, and are non-contributory (unless otherwise stated).  Medications and allergies also reviewed and updated in chart.     ROS negative unless otherwise specified    Physical Exam  Temp Readings from Last 3 Encounters:   05/15/24 97.5 °F (36.4 °C)   04/29/24 96.9 °F (36.1 °C) (Temporal)   04/20/24 98.4 °F (36.9 °C) (Oral)     Wt Readings from Last 3 Encounters:   05/15/24 38.4 kg (84 lb 11.2 oz)   04/27/24 49.1 kg (108 lb 3.9 oz)   04/20/24 44.6 kg (98 lb 6.4 oz)     BP Readings from Last 3 Encounters:   05/15/24 (!) 99/58   04/29/24 123/64   04/20/24 137/72     Pulse Readings from Last 3 Encounters:   05/15/24 (!) 102   04/29/24 68   04/20/24 97       General appearance: alert, well appearing, and in no distress, oriented to person, place, and time and normal appearing weight.    CVS exam: normal rate, regular rhythm, normal S1, S2, no murmurs, rubs, clicks or gallops.  Radial pulses 2+ bilateral.  PT/DP pulse 2+ bilat.  No C/C/E    Chest: clear to auscultation, no wheezes, rales or rhonchi, symmetric air entry.     Abdomen: Soft, non-tender, non-distended, positive BS in all 4 quadrants    Extremities:Dorsalis pedis pulses palpated bilaterally, no clubbing, cyanosis, edema or erythema,     SKIN: no lesions, jaundice, petechiae, pallor, cyanosis, ecchymosis    NEURO: gross motor exam normal by observation, gait normal    Mental status - alert, oriented to person, place, and time, normal mood, behavior, speech, dress, motor activity, and thought

## 2024-05-16 ENCOUNTER — CARE COORDINATION (OUTPATIENT)
Dept: CARE COORDINATION | Age: 69
End: 2024-05-16

## 2024-05-16 NOTE — CARE COORDINATION
Care Transitions Follow Up Call    Care Transition Nurse attempted subsequent CT outreach leaving HIPAA VM, purpose of call, my contact info. Noting discharge wt of 108 lbs 3.9 oz. Office wt 5/15/24 was 84 lbs 11.2 oz. This shows a 24 lb wt loss. RD did outreach pt. PCP chart routed to note this.     Patient: Elisa Toth  Patient : 1955   MRN: [id number]  Reason for Admission: 2024 - 2024 [facility] IP. Intractable abdominal pain, FTT, s/p Lap Cholecystectomy.   Discharge Date: 24 RARS: Readmission Risk Score: 26.5  Readmit  CT     [facility]      Lisa Rowland MD (General Surgery) post-op  8:30. Canceled.  Next PCP 5/15 9:45. Attended. Next  11:30.    Mackenzie Remy RN

## 2024-05-17 ENCOUNTER — TELEPHONE (OUTPATIENT)
Dept: FAMILY MEDICINE CLINIC | Age: 69
End: 2024-05-17

## 2024-05-17 NOTE — TELEPHONE ENCOUNTER
Home Health called and stated that the pt refused home health for today because she has had too many appts this week and she was tired.

## 2024-05-22 ENCOUNTER — CARE COORDINATION (OUTPATIENT)
Dept: CARE COORDINATION | Age: 69
End: 2024-05-22

## 2024-05-22 NOTE — CARE COORDINATION
Care Transitions Follow Up Call    Care Transition Nurse attempted second subsequent outreach leaving HIPAA VM, purpose of call, my contact info and concerns of recent wt loss. Enc to call this writer back.      Patient: Elisa Toth  Patient : 1955   MRN: [id number]  Reason for Admission:  2024 - 2024 [facility] IP. Intractable abdominal pain, FTT, s/p Lap Cholecystectomy.   Discharge Date: 24 RARS: Readmission Risk Score: 26.5  Readmit  CT     [facility]      Lisa Rowland MD (General Surgery) post-op  8:30. Canceled.  Next PCP 5/15 9:45. Attended. Next  11:30.    Mackenzie Remy RN

## 2024-05-23 ENCOUNTER — CARE COORDINATION (OUTPATIENT)
Dept: CARE COORDINATION | Age: 69
End: 2024-05-23

## 2024-05-23 NOTE — CARE COORDINATION
Contacted Elisa Toth and left voicemail regarding Dietitian follow up. Left call back number and will follow up as appropriate.         Maria T Suzao RDN, LD  [phone]

## 2024-05-24 ENCOUNTER — CARE COORDINATION (OUTPATIENT)
Dept: CARE COORDINATION | Age: 69
End: 2024-05-24

## 2024-05-24 NOTE — CARE COORDINATION
Contacted Elisa Toth and left voicemail regarding Dietitian follow up. Left call back number and will follow up as appropriate.         Maria T Suazo RDN, LD  [phone]

## 2024-05-30 ENCOUNTER — CARE COORDINATION (OUTPATIENT)
Dept: CARE COORDINATION | Age: 69
End: 2024-05-30

## 2024-05-30 ENCOUNTER — TELEPHONE (OUTPATIENT)
Dept: FAMILY MEDICINE CLINIC | Age: 69
End: 2024-05-30

## 2024-05-30 NOTE — CARE COORDINATION
Contacted Elisa Toth and left voicemail regarding Dietitian follow up. Left call back number. RD spoke to patient and mailed educational handouts on 5/3/24. RD outreached 5/23/24, 5/24/24 and today 5/30/24- left VM all three outreaches. No additional outreach attempts scheduled at this time. RD will notify CTN. RD will continue to follow/assist with patient return call.       Maria T Suazo RDN, LD  [phone]

## 2024-05-31 ENCOUNTER — CARE COORDINATION (OUTPATIENT)
Dept: CARE COORDINATION | Age: 69
End: 2024-05-31

## 2024-05-31 NOTE — CARE COORDINATION
Care Transitions Follow Up Call    Care Transition Nurse attempted second subsequent outreach leaving HIPAA VM, purpose of call, my contact. CTN s/o.     Patient: Elisa Toth  Patient : 1955   MRN: [id number]  Reason for Admission:  2024 - 2024 [facility] IP. Intractable abdominal pain, FTT, s/p Lap Cholecystectomy.   Discharge Date: 24 RARS: Readmission Risk Score: 26.5  Readmit  CT     [facility]      Lisa Rowland MD (General Surgery) post-op  8:30. Canceled.  Next PCP 5/15 9:45. Attended. Next  11:30.    Mackenzie Remy RN

## 2024-09-11 ENCOUNTER — TELEPHONE (OUTPATIENT)
Dept: FAMILY MEDICINE CLINIC | Age: 69
End: 2024-09-11

## 2024-09-11 NOTE — TELEPHONE ENCOUNTER
Pt sister called in concerned for pt's health. Sister states that the pt had an appt with us last month but cancelled and is refusing to reschedule. Sister states that she is concerned for the pt's overall health that the pt is barely eating or drinking. I explained too the sister that she is not currently on the pt's HIPAA therefore I cannot relay any reply from the doctor to her, that I can only relay information back to the pt or .

## 2024-10-03 ENCOUNTER — HOSPITAL ENCOUNTER (EMERGENCY)
Age: 69
Discharge: LEFT BEFORE DISCHARGE | End: 2024-10-04
Attending: STUDENT IN AN ORGANIZED HEALTH CARE EDUCATION/TRAINING PROGRAM
Payer: MEDICARE

## 2024-10-03 VITALS
RESPIRATION RATE: 18 BRPM | BODY MASS INDEX: 14.18 KG/M2 | WEIGHT: 80 LBS | OXYGEN SATURATION: 100 % | SYSTOLIC BLOOD PRESSURE: 117 MMHG | HEART RATE: 74 BPM | TEMPERATURE: 97.5 F | DIASTOLIC BLOOD PRESSURE: 74 MMHG | HEIGHT: 63 IN

## 2024-10-03 DIAGNOSIS — R06.00 DYSPNEA, UNSPECIFIED TYPE: Primary | ICD-10-CM

## 2024-10-03 LAB
ALBUMIN SERPL-MCNC: 4.1 G/DL (ref 3.5–5.2)
ALP SERPL-CCNC: 47 U/L (ref 35–104)
ALT SERPL-CCNC: 18 U/L (ref 0–32)
ANION GAP SERPL CALCULATED.3IONS-SCNC: 12 MMOL/L (ref 7–16)
AST SERPL-CCNC: 32 U/L (ref 0–31)
BASOPHILS # BLD: 0.03 K/UL (ref 0–0.2)
BASOPHILS NFR BLD: 1 % (ref 0–2)
BILIRUB SERPL-MCNC: 0.5 MG/DL (ref 0–1.2)
BUN SERPL-MCNC: 11 MG/DL (ref 6–23)
CALCIUM SERPL-MCNC: 9.3 MG/DL (ref 8.6–10.2)
CHLORIDE SERPL-SCNC: 104 MMOL/L (ref 98–107)
CO2 SERPL-SCNC: 22 MMOL/L (ref 22–29)
CREAT SERPL-MCNC: 0.5 MG/DL (ref 0.5–1)
EOSINOPHIL # BLD: 0.02 K/UL (ref 0.05–0.5)
EOSINOPHILS RELATIVE PERCENT: 0 % (ref 0–6)
ERYTHROCYTE [DISTWIDTH] IN BLOOD BY AUTOMATED COUNT: 16.2 % (ref 11.5–15)
GFR, ESTIMATED: >90 ML/MIN/1.73M2
GLUCOSE SERPL-MCNC: 100 MG/DL (ref 74–99)
HCT VFR BLD AUTO: 37 % (ref 34–48)
HGB BLD-MCNC: 12.2 G/DL (ref 11.5–15.5)
IMM GRANULOCYTES # BLD AUTO: <0.03 K/UL (ref 0–0.58)
IMM GRANULOCYTES NFR BLD: 0 % (ref 0–5)
LYMPHOCYTES NFR BLD: 1 K/UL (ref 1.5–4)
LYMPHOCYTES RELATIVE PERCENT: 20 % (ref 20–42)
MAGNESIUM SERPL-MCNC: 2.1 MG/DL (ref 1.6–2.6)
MCH RBC QN AUTO: 31 PG (ref 26–35)
MCHC RBC AUTO-ENTMCNC: 33 G/DL (ref 32–34.5)
MCV RBC AUTO: 94.1 FL (ref 80–99.9)
MONOCYTES NFR BLD: 0.43 K/UL (ref 0.1–0.95)
MONOCYTES NFR BLD: 9 % (ref 2–12)
NEUTROPHILS NFR BLD: 70 % (ref 43–80)
NEUTS SEG NFR BLD: 3.52 K/UL (ref 1.8–7.3)
PLATELET # BLD AUTO: 275 K/UL (ref 130–450)
PMV BLD AUTO: 9.8 FL (ref 7–12)
POTASSIUM SERPL-SCNC: 5.9 MMOL/L (ref 3.5–5)
PROT SERPL-MCNC: 6.5 G/DL (ref 6.4–8.3)
RBC # BLD AUTO: 3.93 M/UL (ref 3.5–5.5)
SODIUM SERPL-SCNC: 138 MMOL/L (ref 132–146)
TROPONIN I SERPL HS-MCNC: NORMAL NG/L (ref 0–14)
TROPONIN INTERP: NORMAL
TROPONIN T SERPL-MCNC: NORMAL NG/ML
WBC OTHER # BLD: 5 K/UL (ref 4.5–11.5)

## 2024-10-03 PROCEDURE — 93005 ELECTROCARDIOGRAM TRACING: CPT

## 2024-10-03 PROCEDURE — 83735 ASSAY OF MAGNESIUM: CPT

## 2024-10-03 PROCEDURE — 99284 EMERGENCY DEPT VISIT MOD MDM: CPT

## 2024-10-03 PROCEDURE — 80053 COMPREHEN METABOLIC PANEL: CPT

## 2024-10-03 PROCEDURE — 85025 COMPLETE CBC W/AUTO DIFF WBC: CPT

## 2024-10-03 PROCEDURE — 6360000002 HC RX W HCPCS: Performed by: STUDENT IN AN ORGANIZED HEALTH CARE EDUCATION/TRAINING PROGRAM

## 2024-10-03 RX ORDER — ORPHENADRINE CITRATE 30 MG/ML
60 INJECTION INTRAMUSCULAR; INTRAVENOUS ONCE
Status: DISCONTINUED | OUTPATIENT
Start: 2024-10-03 | End: 2024-10-04 | Stop reason: HOSPADM

## 2024-10-03 ASSESSMENT — PAIN DESCRIPTION - ORIENTATION: ORIENTATION: LEFT

## 2024-10-03 ASSESSMENT — PAIN DESCRIPTION - LOCATION: LOCATION: FLANK

## 2024-10-03 ASSESSMENT — PAIN SCALES - GENERAL: PAINLEVEL_OUTOF10: 10

## 2024-10-03 NOTE — ED PROVIDER NOTES
[facility]  Department of Emergency Medicine   ED  Encounter Note  Admit Date/RoomTime: 10/3/2024  4:19 PM  ED Room: ALISSA/ALISSA    NAME: Elisa Toth  : 1955  MRN: [id number]     Chief Complaint:  Shortness of Breath (C/O SOB with whole body tension. )    History of Present Illness           Elisa Toth is a 69 y.o. old female who presents to the emergency department by private vehicle, with sudden onset left chest discomfort described as pressure beginning 2 day(s) prior to arrival.  The pain does not  radiate to neck, back or abdomen.  Duration of symptoms: to the present time.  Symptom(s) at onset was moderate.    The symptoms are worsened by deep inspiration and relieved by nothing.  There has been No shortness of breath, No dyspnea on exertion, No paroxysmal nocturnal dyspnea, No edema, No palpitations, and No syncope associated with complaint.   She takes no blood thinning agents.    ROS   Pertinent positives and negatives are stated within HPI, all other systems reviewed and are negative.    Past Medical History:  has a past medical history of Abnormal Pap smear of cervix, Anxiety, Chest pain, Detached retina, right, and IBS (irritable bowel syndrome).    Surgical History:  has a past surgical history that includes Tonsillectomy and adenoidectomy; Cataract removal (11); LEEP; and Cholecystectomy (N/A, 2024).    Social History:  reports that she has never smoked. She has never used smokeless tobacco. She reports that she does not drink alcohol and does not use drugs.    Family History: family history includes Cancer in her maternal uncle and maternal uncle; Diabetes in her maternal grandmother; High Blood Pressure in her maternal grandmother; High Cholesterol in her mother.     Allergies: Xanax xr [alprazolam er], Codeine, and Dilaudid [hydromorphone hcl]    Physical Exam   Oxygen Saturation Interpretation: Normal.        ED Triage Vitals [10/03/24 1412]   BP Systolic  with concerns of left-sided chest pain on her ribs after sustaining a mechanical trauma.  Also complained of shortness of breath.  On arrival she was saturating well in room air and was hemodynamically stable.  CBC, BMP, mag, chest x-ray troponin EKG was ordered.  Patient eloped during her ER visit.  Labs were pending as the patient eloped    Plan of Care/Counseling:  EM Attending Physician reviewed today's visit with the patient in addition to providing specific details for the plan of care and counseling regarding the diagnosis and prognosis.  Questions are answered at this time and are agreeable with the plan.  Assessment      1. Dyspnea, unspecified type      This patient's ED course included: a personal history and physicial examination  This patient has remained hemodynamically stable during their ED course.   Plan   Patient eloped.  .    New Medications     New Prescriptions    No medications on file     Electronically signed by Katherine Molina MD   DD: 10/3/24  **This report was transcribed using voice recognition software. Every effort was made to ensure accuracy; however, inadvertent computerized transcription errors may be present.  END OF PROVIDER NOTE

## 2024-10-04 LAB
EKG ATRIAL RATE: 69 BPM
EKG P AXIS: 74 DEGREES
EKG P-R INTERVAL: 106 MS
EKG Q-T INTERVAL: 400 MS
EKG QRS DURATION: 64 MS
EKG QTC CALCULATION (BAZETT): 428 MS
EKG R AXIS: 86 DEGREES
EKG T AXIS: 55 DEGREES
EKG VENTRICULAR RATE: 69 BPM

## 2024-10-04 PROCEDURE — 93010 ELECTROCARDIOGRAM REPORT: CPT | Performed by: INTERNAL MEDICINE

## 2024-10-13 ENCOUNTER — HOSPITAL ENCOUNTER (INPATIENT)
Age: 69
LOS: 2 days | Discharge: HOME | DRG: 190 | End: 2024-10-15
Attending: STUDENT IN AN ORGANIZED HEALTH CARE EDUCATION/TRAINING PROGRAM | Admitting: HOSPITALIST
Payer: MEDICARE

## 2024-10-13 ENCOUNTER — APPOINTMENT (OUTPATIENT)
Dept: CT IMAGING | Age: 69
End: 2024-10-13
Payer: MEDICARE

## 2024-10-13 ENCOUNTER — HOSPITAL ENCOUNTER (EMERGENCY)
Age: 69
Discharge: ANOTHER HOSPITAL | End: 2024-10-13
Attending: EMERGENCY MEDICINE
Payer: MEDICARE

## 2024-10-13 VITALS
WEIGHT: 81 LBS | HEART RATE: 75 BPM | OXYGEN SATURATION: 97 % | DIASTOLIC BLOOD PRESSURE: 76 MMHG | SYSTOLIC BLOOD PRESSURE: 159 MMHG | BODY MASS INDEX: 14.35 KG/M2 | RESPIRATION RATE: 18 BRPM | TEMPERATURE: 98.4 F

## 2024-10-13 DIAGNOSIS — R62.7 FAILURE TO THRIVE IN ADULT: ICD-10-CM

## 2024-10-13 DIAGNOSIS — R64 CACHECTIC (HCC): Primary | ICD-10-CM
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
## 2024-10-13 LAB
ALBUMIN SERPL-MCNC: 4.3 G/DL (ref 3.5–5.2)
ALP SERPL-CCNC: 72 U/L (ref 35–104)
ALT SERPL-CCNC: 18 U/L (ref 0–32)
AMPHET UR QL SCN: NEGATIVE
ANION GAP SERPL CALCULATED.3IONS-SCNC: 13 MMOL/L (ref 7–16)
APAP SERPL-MCNC: <5 UG/ML (ref 10–30)
AST SERPL-CCNC: 27 U/L (ref 0–31)
BARBITURATES UR QL SCN: NEGATIVE
BASOPHILS # BLD: 0.05 K/UL (ref 0–0.2)
BASOPHILS NFR BLD: 1 % (ref 0–2)
BENZODIAZ UR QL: NEGATIVE
BILIRUB SERPL-MCNC: 0.4 MG/DL (ref 0–1.2)
BUN SERPL-MCNC: 19 MG/DL (ref 6–23)
BUPRENORPHINE UR QL: NEGATIVE
CALCIUM SERPL-MCNC: 10 MG/DL (ref 8.6–10.2)
CANNABINOIDS UR QL SCN: NEGATIVE
CHLORIDE SERPL-SCNC: 105 MMOL/L (ref 98–107)
CO2 SERPL-SCNC: 20 MMOL/L (ref 22–29)
COCAINE UR QL SCN: NEGATIVE
CREAT SERPL-MCNC: 0.7 MG/DL (ref 0.5–1)
EOSINOPHIL # BLD: 0.14 K/UL (ref 0.05–0.5)
EOSINOPHILS RELATIVE PERCENT: 2 % (ref 0–6)
ERYTHROCYTE [DISTWIDTH] IN BLOOD BY AUTOMATED COUNT: 16.3 % (ref 11.5–15)
ETHANOLAMINE SERPL-MCNC: <10 MG/DL (ref 0–0.08)
FENTANYL UR QL: NEGATIVE
GFR, ESTIMATED: >90 ML/MIN/1.73M2
GLUCOSE BLD-MCNC: 213 MG/DL (ref 74–99)
GLUCOSE SERPL-MCNC: 103 MG/DL (ref 74–99)
HCT VFR BLD AUTO: 35.8 % (ref 34–48)
HGB BLD-MCNC: 11.8 G/DL (ref 11.5–15.5)
IMM GRANULOCYTES # BLD AUTO: 0.03 K/UL (ref 0–0.58)
IMM GRANULOCYTES NFR BLD: 1 % (ref 0–5)
LACTATE BLDV-SCNC: 1.5 MMOL/L (ref 0.5–1.9)
LYMPHOCYTES NFR BLD: 1.45 K/UL (ref 1.5–4)
LYMPHOCYTES RELATIVE PERCENT: 24 % (ref 20–42)
MAGNESIUM SERPL-MCNC: 2.1 MG/DL (ref 1.6–2.6)
MCH RBC QN AUTO: 31.7 PG (ref 26–35)
MCHC RBC AUTO-ENTMCNC: 33 G/DL (ref 32–34.5)
MCV RBC AUTO: 96.2 FL (ref 80–99.9)
METHADONE UR QL: NEGATIVE
MONOCYTES NFR BLD: 0.6 K/UL (ref 0.1–0.95)
MONOCYTES NFR BLD: 10 % (ref 2–12)
NEUTROPHILS NFR BLD: 62 % (ref 43–80)
NEUTS SEG NFR BLD: 3.68 K/UL (ref 1.8–7.3)
OPIATES UR QL SCN: NEGATIVE
OXYCODONE UR QL SCN: NEGATIVE
PCP UR QL SCN: NEGATIVE
PHOSPHATE SERPL-MCNC: 3.7 MG/DL (ref 2.5–4.5)
PLATELET # BLD AUTO: 298 K/UL (ref 130–450)
PMV BLD AUTO: 9.6 FL (ref 7–12)
POTASSIUM SERPL-SCNC: 4 MMOL/L (ref 3.5–5)
PROT SERPL-MCNC: 6.4 G/DL (ref 6.4–8.3)
RBC # BLD AUTO: 3.72 M/UL (ref 3.5–5.5)
SALICYLATES SERPL-MCNC: <0.3 MG/DL (ref 0–30)
SODIUM SERPL-SCNC: 138 MMOL/L (ref 132–146)
T4 FREE SERPL-MCNC: 1.1 NG/DL (ref 0.9–1.7)
TEST INFORMATION: NORMAL
TOXIC TRICYCLIC SC,BLOOD: NEGATIVE
TROPONIN I SERPL HS-MCNC: 10 NG/L (ref 0–9)
TROPONIN I SERPL HS-MCNC: 8 NG/L (ref 0–9)
TSH SERPL DL<=0.05 MIU/L-ACNC: 3.37 UIU/ML (ref 0.27–4.2)
WBC OTHER # BLD: 6 K/UL (ref 4.5–11.5)

## 2024-10-13 PROCEDURE — 84484 ASSAY OF TROPONIN QUANT: CPT

## 2024-10-13 PROCEDURE — 82962 GLUCOSE BLOOD TEST: CPT

## 2024-10-13 PROCEDURE — 74177 CT ABD & PELVIS W/CONTRAST: CPT

## 2024-10-13 PROCEDURE — 99285 EMERGENCY DEPT VISIT HI MDM: CPT

## 2024-10-13 PROCEDURE — 70450 CT HEAD/BRAIN W/O DYE: CPT

## 2024-10-13 PROCEDURE — 85025 COMPLETE CBC W/AUTO DIFF WBC: CPT

## 2024-10-13 PROCEDURE — G0480 DRUG TEST DEF 1-7 CLASSES: HCPCS

## 2024-10-13 PROCEDURE — 1200000000 HC SEMI PRIVATE

## 2024-10-13 PROCEDURE — 96374 THER/PROPH/DIAG INJ IV PUSH: CPT

## 2024-10-13 PROCEDURE — 84439 ASSAY OF FREE THYROXINE: CPT

## 2024-10-13 PROCEDURE — 83605 ASSAY OF LACTIC ACID: CPT

## 2024-10-13 PROCEDURE — 80053 COMPREHEN METABOLIC PANEL: CPT

## 2024-10-13 PROCEDURE — 6360000002 HC RX W HCPCS: Performed by: HOSPITALIST

## 2024-10-13 PROCEDURE — 84100 ASSAY OF PHOSPHORUS: CPT

## 2024-10-13 PROCEDURE — 80143 DRUG ASSAY ACETAMINOPHEN: CPT

## 2024-10-13 PROCEDURE — 83735 ASSAY OF MAGNESIUM: CPT

## 2024-10-13 PROCEDURE — 87040 BLOOD CULTURE FOR BACTERIA: CPT

## 2024-10-13 PROCEDURE — 80179 DRUG ASSAY SALICYLATE: CPT

## 2024-10-13 PROCEDURE — 71275 CT ANGIOGRAPHY CHEST: CPT

## 2024-10-13 PROCEDURE — 6360000002 HC RX W HCPCS: Performed by: EMERGENCY MEDICINE

## 2024-10-13 PROCEDURE — 84443 ASSAY THYROID STIM HORMONE: CPT

## 2024-10-13 PROCEDURE — 80307 DRUG TEST PRSMV CHEM ANLYZR: CPT

## 2024-10-13 PROCEDURE — 71045 X-RAY EXAM CHEST 1 VIEW: CPT

## 2024-10-13 PROCEDURE — 2580000003 HC RX 258: Performed by: HOSPITALIST

## 2024-10-13 PROCEDURE — 6360000004 HC RX CONTRAST MEDICATION: Performed by: RADIOLOGY

## 2024-10-13 PROCEDURE — 93005 ELECTROCARDIOGRAM TRACING: CPT | Performed by: EMERGENCY MEDICINE

## 2024-10-13 RX ORDER — IPRATROPIUM BROMIDE AND ALBUTEROL SULFATE 2.5; .5 MG/3ML; MG/3ML
1 SOLUTION RESPIRATORY (INHALATION) ONCE
Status: DISCONTINUED | OUTPATIENT
Start: 2024-10-13 | End: 2024-10-13 | Stop reason: HOSPADM

## 2024-10-13 RX ORDER — POTASSIUM CHLORIDE 7.45 MG/ML
10 INJECTION INTRAVENOUS PRN
Status: DISCONTINUED | OUTPATIENT
Start: 2024-10-13 | End: 2024-10-15 | Stop reason: HOSPADM

## 2024-10-13 RX ORDER — ACETAMINOPHEN 650 MG/1
650 SUPPOSITORY RECTAL EVERY 6 HOURS PRN
Status: DISCONTINUED | OUTPATIENT
Start: 2024-10-13 | End: 2024-10-15 | Stop reason: HOSPADM

## 2024-10-13 RX ORDER — SODIUM CHLORIDE 0.9 % (FLUSH) 0.9 %
5-40 SYRINGE (ML) INJECTION PRN
Status: DISCONTINUED | OUTPATIENT
Start: 2024-10-13 | End: 2024-10-15 | Stop reason: HOSPADM

## 2024-10-13 RX ORDER — ONDANSETRON 2 MG/ML
4 INJECTION INTRAMUSCULAR; INTRAVENOUS EVERY 6 HOURS PRN
Status: DISCONTINUED | OUTPATIENT
Start: 2024-10-13 | End: 2024-10-15 | Stop reason: HOSPADM

## 2024-10-13 RX ORDER — 0.9 % SODIUM CHLORIDE 0.9 %
1000 INTRAVENOUS SOLUTION INTRAVENOUS ONCE
Status: DISCONTINUED | OUTPATIENT
Start: 2024-10-13 | End: 2024-10-13 | Stop reason: HOSPADM

## 2024-10-13 RX ORDER — LORAZEPAM 1 MG/1
1 TABLET ORAL ONCE
Status: DISCONTINUED | OUTPATIENT
Start: 2024-10-13 | End: 2024-10-13 | Stop reason: HOSPADM

## 2024-10-13 RX ORDER — ONDANSETRON 4 MG/1
4 TABLET, ORALLY DISINTEGRATING ORAL EVERY 8 HOURS PRN
Status: DISCONTINUED | OUTPATIENT
Start: 2024-10-13 | End: 2024-10-15 | Stop reason: HOSPADM

## 2024-10-13 RX ORDER — SODIUM CHLORIDE 0.9 % (FLUSH) 0.9 %
5-40 SYRINGE (ML) INJECTION EVERY 12 HOURS SCHEDULED
Status: DISCONTINUED | OUTPATIENT
Start: 2024-10-13 | End: 2024-10-15 | Stop reason: HOSPADM

## 2024-10-13 RX ORDER — SENNOSIDES A AND B 8.6 MG/1
1 TABLET, FILM COATED ORAL DAILY PRN
Status: DISCONTINUED | OUTPATIENT
Start: 2024-10-13 | End: 2024-10-15 | Stop reason: HOSPADM

## 2024-10-13 RX ORDER — IOPAMIDOL 755 MG/ML
75 INJECTION, SOLUTION INTRAVASCULAR
Status: COMPLETED | OUTPATIENT
Start: 2024-10-13 | End: 2024-10-13

## 2024-10-13 RX ORDER — POTASSIUM CHLORIDE 1500 MG/1
40 TABLET, EXTENDED RELEASE ORAL PRN
Status: DISCONTINUED | OUTPATIENT
Start: 2024-10-13 | End: 2024-10-15 | Stop reason: HOSPADM

## 2024-10-13 RX ORDER — LORAZEPAM 2 MG/ML
1 INJECTION INTRAMUSCULAR ONCE
Status: COMPLETED | OUTPATIENT
Start: 2024-10-13 | End: 2024-10-13

## 2024-10-13 RX ORDER — LANOLIN ALCOHOL/MO/W.PET/CERES
3 CREAM (GRAM) TOPICAL NIGHTLY PRN
Status: DISCONTINUED | OUTPATIENT
Start: 2024-10-13 | End: 2024-10-15 | Stop reason: HOSPADM

## 2024-10-13 RX ORDER — MAGNESIUM HYDROXIDE/ALUMINUM HYDROXICE/SIMETHICONE 120; 1200; 1200 MG/30ML; MG/30ML; MG/30ML
30 SUSPENSION ORAL EVERY 6 HOURS PRN
Status: DISCONTINUED | OUTPATIENT
Start: 2024-10-13 | End: 2024-10-15 | Stop reason: HOSPADM

## 2024-10-13 RX ORDER — ACETAMINOPHEN 325 MG/1
650 TABLET ORAL EVERY 6 HOURS PRN
Status: DISCONTINUED | OUTPATIENT
Start: 2024-10-13 | End: 2024-10-15 | Stop reason: HOSPADM

## 2024-10-13 RX ORDER — SODIUM CHLORIDE 9 MG/ML
INJECTION, SOLUTION INTRAVENOUS CONTINUOUS
Status: DISCONTINUED | OUTPATIENT
Start: 2024-10-13 | End: 2024-10-14

## 2024-10-13 RX ORDER — MAGNESIUM SULFATE IN WATER 40 MG/ML
2000 INJECTION, SOLUTION INTRAVENOUS PRN
Status: DISCONTINUED | OUTPATIENT
Start: 2024-10-13 | End: 2024-10-15 | Stop reason: HOSPADM

## 2024-10-13 RX ORDER — SODIUM CHLORIDE 9 MG/ML
INJECTION, SOLUTION INTRAVENOUS PRN
Status: DISCONTINUED | OUTPATIENT
Start: 2024-10-13 | End: 2024-10-15 | Stop reason: HOSPADM

## 2024-10-13 RX ADMIN — IOPAMIDOL 75 ML: 755 INJECTION, SOLUTION INTRAVENOUS at 09:53

## 2024-10-13 RX ADMIN — LORAZEPAM 1 MG: 2 INJECTION INTRAMUSCULAR; INTRAVENOUS at 11:42

## 2024-10-13 RX ADMIN — SODIUM CHLORIDE: 9 INJECTION, SOLUTION INTRAVENOUS at 21:09

## 2024-10-13 RX ADMIN — ONDANSETRON 4 MG: 2 INJECTION INTRAMUSCULAR; INTRAVENOUS at 21:09

## 2024-10-13 ASSESSMENT — PAIN - FUNCTIONAL ASSESSMENT: PAIN_FUNCTIONAL_ASSESSMENT: 0-10

## 2024-10-13 NOTE — ED PROVIDER NOTES
68 yo female presenting with concerns of repeated statements of \"I cannot breathe, I cannot breathe.\"  She is frail, cachectic, she keeps referring to chemicals in the house and on her shoes and feet.  She cannot hold herself up hardly and requires assistance to walk.   came back and stated that he used  in the house.  She is lost a bunch of weight and he helps her walk around in the house.  She has refused to go to a nursing home before.  Has been hospitalized a couple of months ago.  This reportedly was for COVID according to .         Family History   Problem Relation Age of Onset    High Cholesterol Mother     Cancer Maternal Uncle         colon    Diabetes Maternal Grandmother     High Blood Pressure Maternal Grandmother     Cancer Maternal Uncle         colon     Past Surgical History:   Procedure Laterality Date    CATARACT REMOVAL  07/17/11    also had scar tissue removed at this time from prev retinal surgeries    CHOLECYSTECTOMY N/A 4/26/2024    Laparoscopic robotic assisted cholecystectomy, possible open, possible cholangiogram performed by Lisa Rowland MD at [facility] [place]    [facility]      TONSILLECTOMY AND ADENOIDECTOMY         Review of Systems   Constitutional:  Positive for fever. Negative for chills.   Respiratory:  Positive for shortness of breath. Negative for chest tightness.    Cardiovascular:  Negative for chest pain.   Gastrointestinal:  Positive for abdominal pain.        Physical Exam  Constitutional:       General: She is not in acute distress.     Comments: Frail, cachectic   HENT:      Head: Normocephalic and atraumatic.   Eyes:      Conjunctiva/sclera: Conjunctivae normal.      Pupils: Pupils are equal, round, and reactive to light.   Neck:      Thyroid: No thyromegaly.   Cardiovascular:      Rate and Rhythm: Normal rate and regular rhythm.   Pulmonary:      Effort: No tachypnea, accessory muscle usage or respiratory distress.      Breath sounds: Normal breath

## 2024-10-13 NOTE — ED NOTES
Pt refused Duoneb and states it smells like rotten eggs and she doesn't want any more chemicals in her body

## 2024-10-14 PROBLEM — E43 SEVERE PROTEIN-CALORIE MALNUTRITION (HCC): Status: ACTIVE | Noted: 2024-10-14

## 2024-10-14 LAB
ALBUMIN SERPL-MCNC: 3.7 G/DL (ref 3.5–5.2)
ALP SERPL-CCNC: 51 U/L (ref 35–104)
ALT SERPL-CCNC: 20 U/L (ref 0–32)
ANION GAP SERPL CALCULATED.3IONS-SCNC: 10 MMOL/L (ref 7–16)
AST SERPL-CCNC: 26 U/L (ref 0–31)
BASOPHILS # BLD: 0.04 K/UL (ref 0–0.2)
BASOPHILS NFR BLD: 1 % (ref 0–2)
BILIRUB SERPL-MCNC: 0.7 MG/DL (ref 0–1.2)
BUN SERPL-MCNC: 11 MG/DL (ref 6–23)
CALCIUM SERPL-MCNC: 8.8 MG/DL (ref 8.6–10.2)
CHLORIDE SERPL-SCNC: 107 MMOL/L (ref 98–107)
CO2 SERPL-SCNC: 24 MMOL/L (ref 22–29)
CREAT SERPL-MCNC: 0.6 MG/DL (ref 0.5–1)
EKG ATRIAL RATE: 72 BPM
EKG P AXIS: 80 DEGREES
EKG P-R INTERVAL: 114 MS
EKG Q-T INTERVAL: 398 MS
EKG QRS DURATION: 76 MS
EKG QTC CALCULATION (BAZETT): 435 MS
EKG R AXIS: 81 DEGREES
EKG T AXIS: 69 DEGREES
EKG VENTRICULAR RATE: 72 BPM
EOSINOPHIL # BLD: 0.04 K/UL (ref 0.05–0.5)
EOSINOPHILS RELATIVE PERCENT: 1 % (ref 0–6)
ERYTHROCYTE [DISTWIDTH] IN BLOOD BY AUTOMATED COUNT: 16.4 % (ref 11.5–15)
GFR, ESTIMATED: >90 ML/MIN/1.73M2
GLUCOSE SERPL-MCNC: 73 MG/DL (ref 74–99)
HCT VFR BLD AUTO: 31.9 % (ref 34–48)
HGB BLD-MCNC: 10.2 G/DL (ref 11.5–15.5)
IMM GRANULOCYTES # BLD AUTO: <0.03 K/UL (ref 0–0.58)
IMM GRANULOCYTES NFR BLD: 0 % (ref 0–5)
LYMPHOCYTES NFR BLD: 1.15 K/UL (ref 1.5–4)
LYMPHOCYTES RELATIVE PERCENT: 24 % (ref 20–42)
MCH RBC QN AUTO: 31 PG (ref 26–35)
MCHC RBC AUTO-ENTMCNC: 32 G/DL (ref 32–34.5)
MCV RBC AUTO: 97 FL (ref 80–99.9)
MONOCYTES NFR BLD: 0.55 K/UL (ref 0.1–0.95)
MONOCYTES NFR BLD: 11 % (ref 2–12)
NEUTROPHILS NFR BLD: 63 % (ref 43–80)
NEUTS SEG NFR BLD: 3.08 K/UL (ref 1.8–7.3)
PHOSPHATE SERPL-MCNC: 3.6 MG/DL (ref 2.5–4.5)
PLATELET # BLD AUTO: 265 K/UL (ref 130–450)
PMV BLD AUTO: 9.4 FL (ref 7–12)
POTASSIUM SERPL-SCNC: 4 MMOL/L (ref 3.5–5)
PROT SERPL-MCNC: 5.4 G/DL (ref 6.4–8.3)
RBC # BLD AUTO: 3.29 M/UL (ref 3.5–5.5)
SODIUM SERPL-SCNC: 141 MMOL/L (ref 132–146)
WBC OTHER # BLD: 4.9 K/UL (ref 4.5–11.5)

## 2024-10-14 PROCEDURE — 6370000000 HC RX 637 (ALT 250 FOR IP): Performed by: HOSPITALIST

## 2024-10-14 PROCEDURE — 1200000000 HC SEMI PRIVATE

## 2024-10-14 PROCEDURE — 99221 1ST HOSP IP/OBS SF/LOW 40: CPT | Performed by: PSYCHIATRY & NEUROLOGY

## 2024-10-14 PROCEDURE — 97161 PT EVAL LOW COMPLEX 20 MIN: CPT

## 2024-10-14 PROCEDURE — 36415 COLL VENOUS BLD VENIPUNCTURE: CPT

## 2024-10-14 PROCEDURE — 2580000003 HC RX 258: Performed by: HOSPITALIST

## 2024-10-14 PROCEDURE — 97165 OT EVAL LOW COMPLEX 30 MIN: CPT

## 2024-10-14 PROCEDURE — 99232 SBSQ HOSP IP/OBS MODERATE 35: CPT | Performed by: STUDENT IN AN ORGANIZED HEALTH CARE EDUCATION/TRAINING PROGRAM

## 2024-10-14 PROCEDURE — 93010 ELECTROCARDIOGRAM REPORT: CPT | Performed by: INTERNAL MEDICINE

## 2024-10-14 PROCEDURE — 6370000000 HC RX 637 (ALT 250 FOR IP): Performed by: STUDENT IN AN ORGANIZED HEALTH CARE EDUCATION/TRAINING PROGRAM

## 2024-10-14 PROCEDURE — 84100 ASSAY OF PHOSPHORUS: CPT

## 2024-10-14 PROCEDURE — 85025 COMPLETE CBC W/AUTO DIFF WBC: CPT

## 2024-10-14 PROCEDURE — 80053 COMPREHEN METABOLIC PANEL: CPT

## 2024-10-14 RX ORDER — HYDROXYZINE HYDROCHLORIDE 10 MG/1
25 TABLET, FILM COATED ORAL 3 TIMES DAILY PRN
Status: DISCONTINUED | OUTPATIENT
Start: 2024-10-14 | End: 2024-10-15 | Stop reason: HOSPADM

## 2024-10-14 RX ORDER — PREDNISONE 20 MG/1
40 TABLET ORAL DAILY
Status: DISCONTINUED | OUTPATIENT
Start: 2024-10-14 | End: 2024-10-15 | Stop reason: HOSPADM

## 2024-10-14 RX ORDER — DOCUSATE SODIUM 100 MG/1
100 CAPSULE, LIQUID FILLED ORAL DAILY
Status: DISCONTINUED | OUTPATIENT
Start: 2024-10-14 | End: 2024-10-15 | Stop reason: HOSPADM

## 2024-10-14 RX ORDER — LORAZEPAM 2 MG/ML
0.5 INJECTION INTRAMUSCULAR EVERY 8 HOURS PRN
Status: DISCONTINUED | OUTPATIENT
Start: 2024-10-14 | End: 2024-10-15 | Stop reason: HOSPADM

## 2024-10-14 RX ORDER — IPRATROPIUM BROMIDE AND ALBUTEROL SULFATE 2.5; .5 MG/3ML; MG/3ML
1 SOLUTION RESPIRATORY (INHALATION)
Status: DISCONTINUED | OUTPATIENT
Start: 2024-10-14 | End: 2024-10-15 | Stop reason: HOSPADM

## 2024-10-14 RX ORDER — POLYETHYLENE GLYCOL 3350 17 G/17G
17 POWDER, FOR SOLUTION ORAL DAILY
Status: DISCONTINUED | OUTPATIENT
Start: 2024-10-14 | End: 2024-10-15 | Stop reason: HOSPADM

## 2024-10-14 RX ADMIN — HYDROXYZINE HYDROCHLORIDE 25 MG: 10 TABLET ORAL at 13:35

## 2024-10-14 RX ADMIN — SODIUM CHLORIDE, PRESERVATIVE FREE 10 ML: 5 INJECTION INTRAVENOUS at 14:06

## 2024-10-14 RX ADMIN — ONDANSETRON 4 MG: 4 TABLET, ORALLY DISINTEGRATING ORAL at 13:35

## 2024-10-14 RX ADMIN — PREDNISONE 40 MG: 20 TABLET ORAL at 13:27

## 2024-10-14 RX ADMIN — SERTRALINE HYDROCHLORIDE 25 MG: 50 TABLET ORAL at 13:27

## 2024-10-14 ASSESSMENT — VISUAL ACUITY: OU: 1

## 2024-10-14 NOTE — PATIENT CARE CONFERENCE
P Quality Flow/Interdisciplinary Rounds Progress Note        Quality Flow Rounds held on October 14, 2024    Disciplines Attending:  Bedside Nurse, , , and Nursing Unit Leadership    Elisa Toth was admitted on 10/13/2024  7:37 PM    Anticipated Discharge Date:       Disposition:    Edison Score:  Edison Scale Score: 19    Readmission Risk              Risk of Unplanned Readmission:  15           Discussed patient goal for the day, patient clinical progression, and barriers to discharge.  The following Goal(s) of the Day/Commitment(s) have been identified: nicole Reyes RN  October 14, 2024

## 2024-10-14 NOTE — CONSULTS
PSYCHIATRY ATTENDING CONSULT    REASON FOR CONSULT:  Extreme anxiety, prior psychiatric admission    REQUESTING PHYSICIAN:  Dr. De Los Santos    CHIEF COMPLAINT: \"I couldn't breathe.\"    HISTORY OF PRESENT ILLNESS:  Elisa Toth is a 69 y.o. female with history of anxiety/depression who was admitted on 10/13/24 apparently due to failure to thrive. Chart reviewed. Note patient on Zoloft 25 mg daily and had a prior inpatient psychiatric hospitalization last year. Psychiatry was asked to evaluate after patient was making bizarre and paranoid statements earlier. Spoke to RN about case and patient is status-post Atarax. Currently patient is found resting in no acute emotional distress. She is somewhat dramatic, somatically preoccupied and doesn't put much effort into assessment. Patient reports she is tired but otherwise denies any psychiatric complaints. She does not make any overtly bizarre or delusional statements currently and claims to not remember saying these things earlier. Patient denies suicidal or homicidal ideations. She is not manic or overtly psychotic.     PAST PSYCHIATRIC HISTORY:  As noted. Denies suicide attempts. No psychiatrist or therapist.    PAST MEDICAL HISTORY:       Diagnosis Date    Abnormal Pap smear of cervix     Anxiety     Chest pain 04/09/2024    Detached retina, right 08/17/2010,10/29/10    surgery, Dr. Euceda [facility]     Failure to thrive in adult     IBS (irritable bowel syndrome)      PAST SURGICAL HISTORY:       Procedure Laterality Date    CATARACT REMOVAL  07/17/11    also had scar tissue removed at this time from prev retinal surgeries    CHOLECYSTECTOMY N/A 4/26/2024    Laparoscopic robotic assisted cholecystectomy, possible open, possible cholangiogram performed by Lisa Rowland MD at [facility] [place]    [facility]      TONSILLECTOMY AND ADENOIDECTOMY       MEDICATIONS: Current Facility-Administered Medications: ipratropium 0.5 mg-albuterol 2.5 mg (DUONEB) nebulizer solution 1 Dose, 1 Dose,

## 2024-10-14 NOTE — CARE COORDINATION
Spoke to Huong from [facility]. [facility] is unable to accept. Patient to make more SNF choices.   Electronically signed by Raeann Hemphill RN on 10/14/2024 at 2:46 PM

## 2024-10-14 NOTE — PROGRESS NOTES
[facility] Hospitalist Progress Note    Admitting Date and Time: 10/13/2024  7:37 PM  Admit Dx: Failure to thrive in adult [R62.7]    Subjective:  Patient is being followed for Failure to thrive in adult [R62.7]   Pt was seen and e [facility].     ROS: denies fever, chills, cp, sob, n/v, HA unless stated above.      ipratropium 0.5 mg-albuterol 2.5 mg  1 Dose Inhalation Q4H WA RT    predniSONE  40 mg Oral Daily    sertraline  25 mg Oral Daily    sodium chloride flush  5-40 mL IntraVENous 2 times per day     sodium chloride flush, 5-40 mL, PRN  sodium chloride, , PRN  potassium chloride, 40 mEq, PRN   Or  potassium alternative oral replacement, 40 mEq, PRN   Or  potassium chloride, 10 mEq, PRN  magnesium sulfate, 2,000 mg, PRN  ondansetron, 4 mg, Q8H PRN   Or  ondansetron, 4 mg, Q6H PRN  melatonin, 3 mg, Nightly PRN  senna, 1 tablet, Daily PRN  aluminum & magnesium hydroxide-simethicone, 30 mL, Q6H PRN  acetaminophen, 650 mg, Q6H PRN   Or  acetaminophen, 650 mg, Q6H PRN         Objective:    BP (!) 103/59   Pulse 64   Temp 98.1 °F (36.7 °C) (Oral)   Resp 16   Ht 1.6 m (5' 3\")   Wt 36.7 kg (81 lb)   SpO2 100%   BMI 14.35 kg/m²     General Appearance: alert and oriented to person, place and time and in no acute distress  Skin: warm and dry  Head: normocephalic and atraumatic  Eyes: pupils equal, round, and reactive to light, extraocular eye movements intact, conjunctivae normal  Neck: neck supple and non tender without mass   Pulmonary/Chest: clear to auscultation bilaterally- no wheezes, rales or rhonchi, normal air movement, no respiratory distress  Cardiovascular: normal rate, normal S1 and S2 and no carotid bruits  Abdomen: soft, non-tender, non-distended, normal bowel sounds, no masses or organomegaly  Extremities: no cyanosis, no clubbing and no edema  Neurologic: no cranial nerve deficit and speech normal        Recent Labs     10/13/24  0800 10/14/24  0409    141   K 4.0 4.0    107

## 2024-10-14 NOTE — PROGRESS NOTES
OCCUPATIONAL THERAPY INITIAL EVALUATION    [facility]   [address] [facility]        Date:10/14/2024                                                  Patient Name: Elisa Toth    MRN: [id number]    : 1955    Room: [address] [place] [address]    Evaluating OT: Martha Beer OTR/L #[id number]    Referring Provider:  Jono Max DO     Specific Provider Orders/Date:  OT Eval and Treat, 10/13/2024     Diagnosis:   No diagnosis found.       Surgery: None       Pertinent Medical History: Anxiety, IBS    Precautions:  Fall Risk, falls and alarm      Assessment of current deficits    [x] Functional mobility  [x]ADLs  [x] Strength               []Cognition    [x] Functional transfers   [x] IADLs         [x] Safety Awareness   [x]Endurance    [] Fine Coordination              [x] Balance      [] Vision/perception   []Sensation     []Gross Motor Coordination  [] ROM  [] Delirium                   [] Motor Control     OT PLAN OF CARE   OT POC based on physician orders, patient diagnosis and results of clinical assessment    Frequency/Duration 2-5 days/wk for 2-4 weeks PRN     Specific OT Treatment Interventions to include:   * Instruction/training on adapted ADL techniques and AE recommendations to increase functional independence within precautions       * Training on energy conservation strategies, correct breathing pattern and techniques to improve independence/tolerance for self-care routine  * Functional transfer/mobility training/DME recommendations for increased independence, safety, and fall prevention  * Patient/Family education to increase follow through with safety techniques and functional independence  * Recommendation of environmental modifications for increased safety with functional transfers/mobility and ADLs  * Therapeutic exercise to improve motor endurance, ROM, and functional strength for ADLs/functional transfers  * Therapeutic activities to  well.     Increase standing tolerance >3 minutes for improved engagement with functional transfers and indep in ADLs     Visual/  Perceptual Glasses: N/A      NA    UE ROM/Strength      Right UE:   AROM: WFL   Strength: 4-/5    Left UE:   AROM : WFL   Strength: 4-/5    Improve overall B UE strength for participation in functional tasks      Hand Dominance: N/A     Hearing:  WFL   Sensation:  No c/o numbness or tingling  Tone:  WFL   Edema: None     Comments: RN cleared patient for OT.   Upon arrival patient in supine. Therapist facilitated and instructed pt on adapted  techniques & compensatory strategies to improve safety and independence with basic ADLs, bed mobility, functional transfers and mobility to allow pt to achieve highest level of independence and safely.  Pt demonstrated fair minus understanding of education & follow through.  At end of session, patient was in supine (declined chair), alarm on, with call light and phone within reach, all lines and tubes intact.  Overall, patient demonstrated  decreased independence and safety during completion of ADL tasks.  Pt would benefit from continued skilled OT to increase safety and independence with completion of ADL tasks and functional mobility for improved quality of life.       Rehab Potential: Good for established goals.      Patient / Family Goal: N/A      Patient and/or family were instructed on functional diagnosis, prognosis/goals and OT plan of care. Demonstrated fair understanding.     Eval Complexity: Low    Time In:  11:45 AM   Time Out: 12:00 PM    Total Treatment Time: 0       Min Units   OT Eval Low 97165  X  1    OT Eval Medium 19153      OT Eval High 41402      OT Re-Eval 22588            ADL/Self Care 32812     Therapeutic Activities 71353       Therapeutic Ex 45411       Orthotic Management 25126       Manual 62477     Neuro Re-Ed 14053       Non-Billable Time        Evaluation Time additionally includes thorough review of current medical

## 2024-10-14 NOTE — PROGRESS NOTES
4 Eyes Skin Assessment     NAME:  Elisa Toth  YOB: 1955  MEDICAL RECORD NUMBER:  [id number]    The patient is being assessed for  Admission    I agree that at least one RN has performed a thorough Head to Toe Skin Assessment on the patient. ALL assessment sites listed below have been assessed.      Areas assessed by both nurses:    Head, Face, Ears, Shoulders, Back, Chest, Arms, Elbows, Hands, Sacrum. Buttock, Coccyx, Ischium, Legs. Feet and Heels, and Under Medical Devices         Does the Patient have a Wound? Scratches on bilateral feet and redness on top of right foot       Edison Prevention initiated by RN: Yes  Wound Care Orders initiated by RN: No    Pressure Injury (Stage 3,4, Unstageable, DTI, NWPT, and Complex wounds) if present, place Wound referral order by RN under : No    New Ostomies, if present place, Ostomy referral order under : No     Nurse 1 eSignature: Electronically signed by Pascale Lea RN on 10/13/24 at 9:59 PM EDT    **SHARE this note so that the co-signing nurse can place an eSignature**    Nurse 2 eSignature: Electronically signed by Natalia Gurrola RN on 10/13/24 at 10:01 PM EDT

## 2024-10-14 NOTE — CARE COORDINATION
Social Work discharge planning  CM consult for snf noted. PT OT ordered. Met with pt and sister Lidia. Pt lives with her . Pt said she wants to go to snf. She has already chosen 1. [facility]. Referral called  to Huong. Gave pt snf list to make 2-3 more choices asap.   Sister stopped SW in [facility] and asked if pt can be evaluated by Psychiatry.  Notified charge Rn.  Electronically signed by LISANDRA Rojas on 10/14/2024 at 1:14 PM

## 2024-10-14 NOTE — ACP (ADVANCE CARE PLANNING)
Advance Care Planning   Healthcare Decision Maker:    Primary Decision Maker (Active): Haider Toth - [facility] - [phone]    Click here to complete Healthcare Decision Makers including selection of the Healthcare Decision Maker Relationship (ie \"Primary\").

## 2024-10-14 NOTE — H&P
[facility] Hospitalist Group History and Physical      ASSESSMENT:      Principal Problem:    Failure to thrive in adult  Active Problems:    Vertigo    Fall    MDD (major depressive disorder)    Anxiety    Severe protein-calorie malnutrition (HCC)    Supraventricular tachycardia (HCC)  Resolved Problems:    * No resolved hospital problems. *      PLAN:    Dyspnea: T she attributes her symptoms to exposure to fumes from her residence .  Failure to thrive/weakness: Consult physical Occupational Therapy.  Consult dietitian  History of vertigo: Patient is poor historian.  Denies dizziness.  Continue home medications.    Code Status: Full code  VTE prophylaxis: Lovenox  Dispo: From home,      CHIEF COMPLAINT:  had no chief complaint listed for this encounter.    History of Present Illness:      69-year-old lady PMH of vertigo, SVT, severe protein calorie malnutrition, anxiety, portal vein thrombosis presented to the ED for shortness of breath which he attributes to carpet cleaning chemicals.  She told me several times she is not a good historian.  She tells me she \"has trouble putting things together.\"  Presently she does not feel short of breath.  No other complaints    Informant(s) for H&P: Patient, EHR    REVIEW OF SYSTEMS:  A comprehensive review of systems was negative except for: what is in the HPI      PMH:  Past Medical History:   Diagnosis Date    Abnormal Pap smear of cervix     Anxiety     Chest pain 04/09/2024    Detached retina, right 08/17/2010,10/29/10    surgery, Dr. Euceda [facility]     Failure to thrive in adult     IBS (irritable bowel syndrome)        Surgical History:  Past Surgical History:   Procedure Laterality Date    CATARACT REMOVAL  07/17/11    also had scar tissue removed at this time from prev retinal surgeries    CHOLECYSTECTOMY N/A 4/26/2024    Laparoscopic robotic assisted cholecystectomy, possible open, possible cholangiogram performed by Lisa Rowland MD at [facility] [place]     [facility]

## 2024-10-14 NOTE — PROGRESS NOTES
Physical Therapy  Facility/Department: [address] MED SURG  Physical Therapy Initial Assessment    Name: Elisa Toth  : 1955  MRN: [id number]  Date of Service: 10/14/2024    Attending Provider:  Efraín De Los Santos DO    Evaluating PT:  Colt Archuleta Jr., P.T.    Room #:  [facility] [place] [place]  Diagnosis:  Failure to thrive in adult [R62.7]  Pertinent PMHx/PSHx:  Anxiety, detatched retina R eye  Precautions:  Falls, bed/chair alarm, impaired cognition    SUBJECTIVE:    Pt lives with her  in a 1 story home with 2 stairs and no rail to enter.  Pt ambulated with no AD PTA.    OBJECTIVE:   Initial Evaluation  Date: 10/14/24 Treatment Short Term/ Long Term   Goals   Was pt agreeable to Eval/treatment? yes     Does pt have pain? No c/o pain     Bed Mobility  Rolling: supervision  Supine to sit: supervision  Sit to supine: supervision  Scooting: supervision  Independent    Transfers Sit to stand: supervision  Stand to sit: supervision  Stand pivot: SBA  Independent   Ambulation   100 feet with no AD SBA/CGA  250 feet with no AD Independent    Stair negotiation: ascended and descended NA, pt c/o fatigue limiting amb distance.  2 steps with 1 rail Independent   AM-PAC 6 Clicks        BLE ROM is WFL.   BLE strength is grossly 4/5 to 4+/5.   Balance: sitting is supervision and standing with no AD is supervision  Endurance: fair    ASSESSMENT:    Conditions Requiring Skilled Therapeutic Intervention:    [x]Decreased strength     []Decreased ROM  [x]Decreased functional mobility  [x]Decreased balance   [x]Decreased endurance   []Decreased posture  []Decreased sensation  []Decreased coordination   []Decreased vision  [x]Decreased safety awareness   []Increased pain       Comments:  Pt was in bed and was agreeable to PT with some encouragement.  She perseverated on the fact she states she can not breathe, but has no SOB and able to hold a conversation without SOB at rest and with activity.  Prior to getting up OOB her O2 sat  gathering information on past medical history/social history and prior level of function, completion of standardized testing/informal observation of tasks, assessment of data and education on plan of care and goals.    CPT codes:  [x] Low Complexity PT evaluation 42030  [] Moderate Complexity PT evaluation 32128  [] High Complexity PT evaluation 80890  [] PT Re-evaluation 82040  [] Gait training 87472 ** minutes  [] Manual therapy 82462 ** minutes  [] Therapeutic activities 63258 ** minutes  [] Therapeutic exercises 83559 ** minutes  [] Neuromuscular reeducation 77984 ** minutes     Colt Archuleta Jr., P.T.  License Number: [id number]

## 2024-10-14 NOTE — PROGRESS NOTES
Pt is screaming out, saying that \"we are trying to poison her\" and that \"we gave her too many toxins in the medications\" pt is scared of having the light on, stating that she does not know where she wants to go when se leaves here, This R attempted to go over methods to decrease anxiety.  Dr. De Los Santos ordered Ativan per MAR. PT stated that she catn take one more medication and that she \"hasn't breathed in months\" and she \" along time ago\" and she drank too much water and Boost

## 2024-10-14 NOTE — PROGRESS NOTES
Spiritual Health History and Assessment/Progress Note  [facility]     Encounter, Rituals, Rites and Sacraments,  ,  ,      Name: Elisa Toth MRN: [id number]    Age: 69 y.o.     Sex: female   Language: English   Yarsani: Faith   Failure to thrive in adult     Date: 10/14/2024                           Spiritual Assessment began in [facility] MED SURG        Referral/Consult From: Rounding   Encounter Overview/Reason:  Encounter, Rituals, Rites and Sacraments  Service Provided For: Patient    Viaeny, Belief, Meaning:   Patient is connected with a vianey tradition or spiritual practice  Family/Friends are connected with a vianey tradition or spiritual practice      Importance and Influence:  Patient has no beliefs influential to healthcare decision-making identified during this visit  Family/Friends have no beliefs influential to healthcare decision-making identified during this visit    Community:  Patient feels well-supported. Support system includes: Spouse/Partner and Other: Sister  Family/Friends feel well-supported. Support system includes: Other: Sister in-law/brother in-law    Assessment and Plan of Care:     Patient Interventions include: Provided sacramental/Yarsani ritual  Family/Friends Interventions include: Affirmed coping skills/support systems    Patient Plan of Care: Spiritual Care available upon further referral  Family/Friends Plan of Care: Spiritual Care available upon further referral    Electronically signed by Chaplain Imer on 10/14/2024 at 4:06 PM

## 2024-10-14 NOTE — CONSULTS
Comprehensive Nutrition Assessment    Type and Reason for Visit:  Initial, Positive Nutrition Screen, Consult    Nutrition Recommendations/Plan:   Continue current diet  Continue current ONS, increase to 2 at each meal, chocolate per pt preference  Consider appetite stimulant  Pt's family declined feeding tube at this time  Monitor      Malnutrition Assessment:  Malnutrition Status:  Severe malnutrition (10/14/24 1143)    Context:  Chronic Illness     Findings of the 6 clinical characteristics of malnutrition:  Energy Intake:  75% or less estimated energy requirements for 1 month or longer  Weight Loss:  Greater than 10% over 6 months (-11.3% X 6 months)     Body Fat Loss:  Severe body fat loss Orbital, Triceps, Buccal region   Muscle Mass Loss:  Severe muscle mass loss Temples (temporalis), Clavicles (pectoralis & deltoids), Hand (interosseous), Scapula (trapezius)  Fluid Accumulation:  No significant fluid accumulation     Strength:  Not Performed    Nutrition Assessment:    Pt admits w/ dyspnea, FTT/weakness. Hx anxiety. Met w/ pt's  and sister in the hallway, her  states she spits everything out, sister requesting 2 Ensure at each meal. When I asked about a feeding tube pt's  said \"no feeding tube\" and pt's sister added \"at this time\".    Nutrition Related Findings:    A&O/poor historian/yelling out at time of room visit, I&Os WDL, no edema, abd flat, +BS, N/V, loss of appetite   Wound Type: None       Current Nutrition Intake & Therapies:    Average Meal Intake: Unable to assess (poor PTA)  Average Supplements Intake: Unable to assess  ADULT ORAL NUTRITION SUPPLEMENT; Breakfast, Lunch, Dinner; Standard High Calorie/High Protein Oral Supplement  ADULT DIET; Regular    Anthropometric Measures:  Height: 160 cm (5' 3\")  Ideal Body Weight (IBW): 115 lbs (52 kg)    Admission Body Weight: 36.7 kg (81 lb) (10/14 bed)  Current Body Weight: 36.7 kg (81 lb) (10/14), 70.4 % IBW. Weight Source: Bed  Scale  Current BMI (kg/m2): 14.4  Usual Body Weight: 41.4 kg (91 lb 5 oz) (4/2024 actual per EMR, 103# X 1 year)  % Weight Change (Calculated): -11.3                    BMI Categories: Underweight (BMI less than 22) age over 65    Estimated Daily Nutrient Needs:  Energy Requirements Based On: Kcal/kg  Weight Used for Energy Requirements: Current  Energy (kcal/day):   Weight Used for Protein Requirements: Current  Protein (g/day): 55-65  Method Used for Fluid Requirements: 1 ml/kcal  Fluid (ml/day):     Nutrition Diagnosis:   Severe malnutrition, In context of chronic illness related to inadequate protein-energy intake as evidenced by Criteria as identified in malnutrition assessment    Nutrition Interventions:   Food and/or Nutrient Delivery: Continue Current Diet, Continue Oral Nutrition Supplement  Nutrition Education/Counseling: Education initiated (discussed ONS/feeding tube w/ pt's family)  Coordination of Nutrition Care: Continue to monitor while inpatient       Goals:     Goals: PO intake 50% or greater, by next RD assessment       Nutrition Monitoring and Evaluation:      Food/Nutrient Intake Outcomes: Food and Nutrient Intake, Supplement Intake  Physical Signs/Symptoms Outcomes: Biochemical Data, GI Status, Fluid Status or Edema, Nutrition Focused Physical Findings, Skin, Weight, Nausea or Vomiting    Discharge Planning:    Too soon to determine     Miranda D'Amico, RD, LD  Contact: [phone]

## 2024-10-15 VITALS
WEIGHT: 84.7 LBS | DIASTOLIC BLOOD PRESSURE: 57 MMHG | RESPIRATION RATE: 16 BRPM | BODY MASS INDEX: 15.01 KG/M2 | HEIGHT: 63 IN | HEART RATE: 66 BPM | SYSTOLIC BLOOD PRESSURE: 117 MMHG | OXYGEN SATURATION: 98 % | TEMPERATURE: 97.5 F

## 2024-10-15 LAB
BASOPHILS # BLD: 0.04 K/UL (ref 0–0.2)
BASOPHILS NFR BLD: 1 % (ref 0–2)
EOSINOPHIL # BLD: 0.02 K/UL (ref 0.05–0.5)
EOSINOPHILS RELATIVE PERCENT: 0 % (ref 0–6)
ERYTHROCYTE [DISTWIDTH] IN BLOOD BY AUTOMATED COUNT: 15.9 % (ref 11.5–15)
HCT VFR BLD AUTO: 31.4 % (ref 34–48)
HGB BLD-MCNC: 10.4 G/DL (ref 11.5–15.5)
IMM GRANULOCYTES # BLD AUTO: <0.03 K/UL (ref 0–0.58)
IMM GRANULOCYTES NFR BLD: 0 % (ref 0–5)
LYMPHOCYTES NFR BLD: 1.07 K/UL (ref 1.5–4)
LYMPHOCYTES RELATIVE PERCENT: 14 % (ref 20–42)
MCH RBC QN AUTO: 31.7 PG (ref 26–35)
MCHC RBC AUTO-ENTMCNC: 33.1 G/DL (ref 32–34.5)
MCV RBC AUTO: 95.7 FL (ref 80–99.9)
MONOCYTES NFR BLD: 0.61 K/UL (ref 0.1–0.95)
MONOCYTES NFR BLD: 8 % (ref 2–12)
NEUTROPHILS NFR BLD: 77 % (ref 43–80)
NEUTS SEG NFR BLD: 5.75 K/UL (ref 1.8–7.3)
PLATELET # BLD AUTO: 283 K/UL (ref 130–450)
PMV BLD AUTO: 9.3 FL (ref 7–12)
RBC # BLD AUTO: 3.28 M/UL (ref 3.5–5.5)
WBC OTHER # BLD: 7.5 K/UL (ref 4.5–11.5)

## 2024-10-15 PROCEDURE — 36415 COLL VENOUS BLD VENIPUNCTURE: CPT

## 2024-10-15 PROCEDURE — 97530 THERAPEUTIC ACTIVITIES: CPT

## 2024-10-15 PROCEDURE — 99239 HOSP IP/OBS DSCHRG MGMT >30: CPT | Performed by: STUDENT IN AN ORGANIZED HEALTH CARE EDUCATION/TRAINING PROGRAM

## 2024-10-15 PROCEDURE — 85025 COMPLETE CBC W/AUTO DIFF WBC: CPT

## 2024-10-15 RX ORDER — PREDNISONE 20 MG/1
40 TABLET ORAL DAILY
Qty: 10 TABLET | Refills: 0 | Status: SHIPPED | OUTPATIENT
Start: 2024-10-16 | End: 2024-10-21

## 2024-10-15 NOTE — PROGRESS NOTES
CLINICAL PHARMACY NOTE: MEDS TO BEDS    Total # of Prescriptions Filled: 1   The following medications were delivered to the patient:  Prednisone 20    Additional Documentation:

## 2024-10-15 NOTE — CARE COORDINATION
Social Work discharge planning  SW met with pt. We discussed her PTOT [facility]. She said she is able to walk and wants to go home. Sw asked about hhc choice. Pt said she will discuss with her  and let SW know IF she wants hhc.   Electronically signed by LISANDRA Rojas on 10/15/2024 at 10:13 AM

## 2024-10-15 NOTE — CONSULTS
Warren Tolliver M.D.,[facility]  Ryan Donahue D.O., SVEN., [facility]  Callie Dale M.D.  Catalina Hamilton M.D.   Deshawn Alvarez D.O.  Con Ruiz M.D.       Patient:  Elisa Toth 69 y.o. female MRN: [id number]           PULMONARY CONSULTATION    Reason for Consultation: COPD exacerbation, patient known to you  Referring Physician:     Communication with the referring physician will be sent via the electronic medical record.    Chief Complaint: \"I cannot breath\"    CODE STATUS: Full code    SUBJECTIVE:  HPI:  Elisa Toth is a 69 y.o. female with a PMH of chronic abdominal pain, psychosis, malnutrition, biliary dyskinesia, anxiety, Schatzki's ring and prior superior mesenteric vein thrombosis. She presented to the ER on 10/13 stating she couldn't breath. After reviewing the ER notes patient was fixating on chemicals in her house and stating she couldn't breath. She had no signs of hypoxia, respiratory distress, stridor or wheezing. She was very anxious in the ER and was given ativan. Patient is unable to sit up on her own unable to walk on her own, needs physical therapy, nutrition, muscle strengthening and balance training as well. She was admitted to hospital with plan to go to the nursing facility following.    Patient was seen by Dr. Hamilton from a previous admission in April 2024 for shortness of breath. After reviewing her notes she had no history of chronic lung disease, smoking or home oxygen use. It appeared that the shortness of breath could be psychogenic. Patient was offered a PFT, but never followed-up.    Today Elisa was seen resting in bed on room air. She said that her breathing was fine and she didn't feel like she had COPD. Lungs were clear on exam. She denies smoking history and cough. Did discuss a PFT test as outpatient, but she declined.        Past Medical History:   Diagnosis Date    Abnormal Pap smear of cervix     Anxiety     Chest pain 04/09/2024    Detached retina, right  computerized transcription errors may be present      This is confirmation that I have personally performed a substantial portion of medical decision making (>50%) related to this patient encounter.  The medications & laboratory data and imagery were discussed and adjusted where necessary. Key issues of the case were discussed among consultants.  Review of CNP documentation was conducted and revisions were made as appropriate. I agree with the above documented exam, problem list and plan of care with the following additions:    No acute complaints from a pulmonary standpoint per patient.  States he feels better overall and denies any shortness of breath to me.  She is lifelong never smoker and no overt evidence of emphysema on her chest imaging.  Suspect this is more likely psychogenic versus deconditioning.  Patient okay to be discharged home from pulmonary standpoint this time    Con Ruiz MD

## 2024-10-15 NOTE — PLAN OF CARE
Problem: Safety - Adult  Goal: Free from fall injury  Outcome: Progressing     Problem: Nutrition Deficit:  Goal: Optimize nutritional status  Outcome: Progressing

## 2024-10-15 NOTE — PROGRESS NOTES
Patient refused all medications this morning. Stated that she has been having trouble swallowing. Physician notified. Electronically signed by Griffin Marie RN on 10/15/2024 at 10:53 AM

## 2024-10-15 NOTE — PATIENT CARE CONFERENCE
P Quality Flow/Interdisciplinary Rounds Progress Note        Quality Flow Rounds held on October 15, 2024    Disciplines Attending:  Bedside Nurse, , , and Nursing Unit Leadership    Elisa Toth was admitted on 10/13/2024  7:37 PM    Anticipated Discharge Date:       Disposition:    Edison Score:  Edison Scale Score: 20    Readmission Risk              Risk of Unplanned Readmission:  17           Discussed patient goal for the day, patient clinical progression, and barriers to discharge.  The following Goal(s) of the Day/Commitment(s) have been identified:  Discharge - Obtain Order      Sarina Reyes RN  October 15, 2024

## 2024-10-15 NOTE — DISCHARGE SUMMARY
[facility] Hospitalist Physician Discharge Summary       No follow-up provider specified.    Activity level: As tolerated     Dispo: home       Condition on discharge: Stable     Patient ID:  Elisa Toth  [id number]  69 y.o.  1955    Admit date: 10/13/2024    Discharge date and time:  10/15/2024  1:27 PM    Admission Diagnoses: Principal Problem:    Failure to thrive in adult  Active Problems:    MDD (major depressive disorder)    Vertigo    Anxiety disorder    Severe protein-calorie malnutrition (HCC)    Supraventricular tachycardia (HCC)    Fall  Resolved Problems:    * No resolved hospital problems. *      Discharge Diagnoses: Principal Problem:    Failure to thrive in adult  Active Problems:    MDD (major depressive disorder)    Vertigo    Anxiety disorder    Severe protein-calorie malnutrition (HCC)    Supraventricular tachycardia (HCC)    Fall  Resolved Problems:    * No resolved hospital problems. *      Consults:  IP CONSULT TO DIETITIAN  IP CONSULT TO CASE MANAGEMENT  IP CONSULT TO PULMONOLOGY  IP CONSULT TO PSYCHIATRY    Procedures:     Hospital Course:       69-year-old female admitted for bronchospasm after inhaled from cleaning chemicals at home with mild COPD exacerbation she received steroid treatment and also breathing treatment psych consulted for anxiety disorder PT OT evaluated patient refused to be placed  Medically stable discharge  Discharge Exam:    General Appearance: alert and oriented to person, place and time and in no acute distress  Skin: warm and dry  Head: normocephalic and atraumatic  Eyes: pupils equal, round, and reactive to light, extraocular eye movements intact, conjunctivae normal  Neck: neck supple and non tender without mass   Pulmonary/Chest: clear to auscultation bilaterally- no wheezes, rales or rhonchi, normal air movement, no respiratory distress  Cardiovascular: normal rate, normal S1 and S2 and no carotid bruits  Abdomen: soft, non-tender, non-distended,

## 2024-10-15 NOTE — PROGRESS NOTES
Pt refused all medications tonight. Pt states \"they are toxins and we are poisoning her\". Educated pt on each use of medication, pt still refused.     Electronically signed by Chanel Crane RN on 10/15/2024 at 2:07 AM    84

## 2024-10-15 NOTE — PROGRESS NOTES
Occupational Therapy  OT BEDSIDE TREATMENT NOTE      Date:10/15/2024  Patient Name: Elisa Toth  MRN: [id number]  : 1955  Room: [address] [place], [place] [address]     Evaluating OT: Martha Nolan OTR/L #[id number]     Referring Provider:  Jono Max DO      Specific Provider Orders/Date:  OT Eval and Treat, 10/13/2024      Diagnosis:   No diagnosis found.        Surgery: None        Pertinent Medical History: Anxiety, IBS     Precautions:  Fall Risk, falls and alarm       Assessment of current deficits    [x] Functional mobility            [x]ADLs           [x] Strength                   []Cognition    [x] Functional transfers          [x] IADLs          [x] Safety Awareness   [x]Endurance    [] Fine Coordination              [x] Balance      [] Vision/perception    []Sensation      []Gross Motor Coordination  [] ROM           [] Delirium                   [] Motor Control      OT PLAN OF CARE   OT POC based on physician orders, patient diagnosis and results of clinical assessment     Frequency/Duration 2-5 days/wk for 2-4 weeks PRN      Specific OT Treatment Interventions to include:   * Instruction/training on adapted ADL techniques and AE recommendations to increase functional independence within precautions       * Training on energy conservation strategies, correct breathing pattern and techniques to improve independence/tolerance for self-care routine  * Functional transfer/mobility training/DME recommendations for increased independence, safety, and fall prevention  * Patient/Family education to increase follow through with safety techniques and functional independence  * Recommendation of environmental modifications for increased safety with functional transfers/mobility and ADLs  * Therapeutic exercise to improve motor endurance, ROM, and functional strength for ADLs/functional transfers  * Therapeutic activities to facilitate/challenge dynamic balance, stand tolerance for increased safety and independence with ADLs  *

## 2024-10-16 ENCOUNTER — CARE COORDINATION (OUTPATIENT)
Dept: CARE COORDINATION | Age: 69
End: 2024-10-16

## 2024-10-16 DIAGNOSIS — R62.7 FAILURE TO THRIVE IN ADULT: Primary | Chronic | ICD-10-CM

## 2024-10-16 PROCEDURE — 1111F DSCHRG MED/CURRENT MED MERGE: CPT | Performed by: FAMILY MEDICINE

## 2024-10-16 NOTE — CARE COORDINATION
Care Transitions Note    Initial Call - Call within 2 business days of discharge: Yes    Patient Current Location:  Home: [address] [place] [address] [place] [address]    Care Transition Nurse contacted the spouse/partner  Haider , (PHI form verified; on file) by telephone to perform post hospital discharge assessment, verified name and  as identifiers. Provided introduction to self, and explanation of the Care Transition Nurse role.     Patient: Elisa Toth    Patient : 1955   MRN: [id number]    Reason for Admission: SOB, FTT in adult  Discharge Date: 10/15/24  RURS: Readmission Risk Score: 15      Last Discharge Facility       Date Complaint Diagnosis Description Type Department Provider    10/13/24   Admission (Discharged) [facility] Efraín De Los Santos, DO    10/13/24 Shortness of Breath Cachectic (HCC) ... ED (TRANSFER) [facility] Kyaw Emmanuel, DO            Was this an external facility discharge? No    Additional needs identified to be addressed with provider   No needs identified             Method of communication with provider: none.    Patients top risk factors for readmission: caregiver stress, depression, medical condition-SOB, FTT in adult, anxiety, and utilization of services    Interventions to address risk factors:   Review of patient management of conditions/medications.    Care Summary Note:   -Patient transferred from [facility] and admitted to [facility] on 10/13/24 with symptoms of SOB which patient attributes to inhaling carpet cleaning chemicals.  See hospital discharge summary for further details.   -Patient's  reports that his wife still feels she is short of breath and isn't sleeping well.  Haider states he sleeps in another room so he can get some sleep.  -Haider seemed unaware of PCP TCM/HFU for 10/21/24.  CTN reviewed date/time with patient's  and he states he will get her there.  -Patient's  denies any needs at this time and is agreeable to continued follow up from care

## 2024-10-18 LAB
MICROORGANISM SPEC CULT: NORMAL
MICROORGANISM SPEC CULT: NORMAL
SERVICE CMNT-IMP: NORMAL
SERVICE CMNT-IMP: NORMAL
SPECIMEN DESCRIPTION: NORMAL
SPECIMEN DESCRIPTION: NORMAL

## 2024-10-19 NOTE — PROGRESS NOTES
Physician Progress Note      PATIENT:               SADI HAIRSTON  CSN #:                  [id number]  :                       1955  ADMIT DATE:       10/13/2024 7:37 PM  DISCH DATE:        10/15/2024 2:50 PM  RESPONDING  PROVIDER #:        Efraín De Los Santos DO          QUERY TEXT:    Pt with documented Failure to thrive. Pt noted to have MDD, Personality and   Anxiety Disorder. If possible, please document in progress notes and discharge   summary the etiology of FTT.    The medical record reflects the following:  Risk Factors: Psych consult: Anxiety Disorder, Personality Disorder. MDD also   noted in active problems.  Clinical Indicators:  Per notes \"Failure to thrive/weakness: Consult physical   Occupational Therapy.  Consult dietitian\"  Treatment: PT/OT, Dietician, ONS  Options provided:  -- Failure to thrive due to MDD  -- Failure to thrive due to Anxiety Disorder  -- Failure to thrive due to Personality Disorder  -- Failure to thrive due to, please specify etiology  -- Other - I will add my own diagnosis  -- Disagree - Not applicable / Not valid  -- Disagree - Clinically unable to determine / Unknown  -- Refer to Clinical Documentation Reviewer    PROVIDER RESPONSE TEXT:    Failure to thrive due to Personality Disorder    Query created by: Adriana Wylie on 10/17/2024 12:35 PM      Electronically signed by:  Efraín De Los Santos DO 10/19/2024 7:20 PM

## 2024-10-21 ENCOUNTER — OFFICE VISIT (OUTPATIENT)
Dept: FAMILY MEDICINE CLINIC | Age: 69
End: 2024-10-21

## 2024-10-21 VITALS
RESPIRATION RATE: 16 BRPM | BODY MASS INDEX: 14.7 KG/M2 | DIASTOLIC BLOOD PRESSURE: 62 MMHG | WEIGHT: 83 LBS | OXYGEN SATURATION: 100 % | TEMPERATURE: 96.6 F | SYSTOLIC BLOOD PRESSURE: 100 MMHG | HEART RATE: 82 BPM

## 2024-10-21 DIAGNOSIS — F41.9 ANXIETY: ICD-10-CM

## 2024-10-21 DIAGNOSIS — F32.1 CURRENT MODERATE EPISODE OF MAJOR DEPRESSIVE DISORDER, UNSPECIFIED WHETHER RECURRENT (HCC): ICD-10-CM

## 2024-10-21 DIAGNOSIS — Z09 HOSPITAL DISCHARGE FOLLOW-UP: Primary | ICD-10-CM

## 2024-10-21 DIAGNOSIS — F41.1 GENERALIZED ANXIETY DISORDER: ICD-10-CM

## 2024-10-21 DIAGNOSIS — R62.7 FAILURE TO THRIVE IN ADULT: Chronic | ICD-10-CM

## 2024-10-21 RX ORDER — LORAZEPAM 0.5 MG/1
0.5 TABLET ORAL EVERY 8 HOURS PRN
Qty: 90 TABLET | Refills: 2 | Status: SHIPPED
Start: 2024-10-21 | End: 2024-10-21

## 2024-10-21 RX ORDER — LORAZEPAM 0.5 MG/1
0.5 TABLET ORAL EVERY 8 HOURS PRN
Qty: 90 TABLET | Refills: 2 | Status: SHIPPED | OUTPATIENT
Start: 2024-10-21 | End: 2025-01-19

## 2024-10-21 RX ORDER — LORAZEPAM 0.5 MG/1
0.5 TABLET ORAL EVERY 8 HOURS PRN
COMMUNITY
Start: 2024-07-23 | End: 2024-10-21 | Stop reason: SDUPTHER

## 2024-10-21 SDOH — ECONOMIC STABILITY: FOOD INSECURITY: WITHIN THE PAST 12 MONTHS, YOU WORRIED THAT YOUR FOOD WOULD RUN OUT BEFORE YOU GOT MONEY TO BUY MORE.: NEVER TRUE

## 2024-10-21 SDOH — ECONOMIC STABILITY: FOOD INSECURITY: WITHIN THE PAST 12 MONTHS, THE FOOD YOU BOUGHT JUST DIDN'T LAST AND YOU DIDN'T HAVE MONEY TO GET MORE.: NEVER TRUE

## 2024-10-21 SDOH — ECONOMIC STABILITY: INCOME INSECURITY: HOW HARD IS IT FOR YOU TO PAY FOR THE VERY BASICS LIKE FOOD, HOUSING, MEDICAL CARE, AND HEATING?: NOT HARD AT ALL

## 2024-10-21 NOTE — PROGRESS NOTES
Post-Discharge Transitional Care Follow Up      Elisa Toth   YOB: 1955    Date of Office Visit:  10/21/2024  Date of Hospital Admission: 10/13/24  Date of Hospital Discharge: 10/15/24  Readmission Risk Score (high >=14%. Medium >=10%):Readmission Risk Score: 15      Care management risk score Rising risk (score 2-5) and Complex Care (Scores >=6): No Risk Score On File     Non face to face  following discharge, date last encounter closed (first attempt may have been earlier): 10/16/2024     Call initiated 2 business days of discharge: Yes     Hospital discharge follow-up  -     [facility] DISCHARGE MEDS RECONCILED W/ CURRENT OUTPATIENT MED LIST  Anxiety  -     Moises Rangel MD, Behavioral Health, [place] ([facility])  -     LORazepam (ATIVAN) 0.5 MG tablet; Take 1 tablet by mouth every 8 hours as needed for Anxiety for up to 90 days. Max Daily Amount: 1.5 mg, Disp-90 tablet, R-2Print  Generalized anxiety disorder  -     Moises Rangel MD, Behavioral Health, [facility])  -     LORazepam (ATIVAN) 0.5 MG tablet; Take 1 tablet by mouth every 8 hours as needed for Anxiety for up to 90 days. Max Daily Amount: 1.5 mg, Disp-90 tablet, R-2Print  Failure to thrive in adult  Current moderate episode of major depressive disorder, unspecified whether recurrent ([facility])    Medical Decision Making: high complexity  No follow-ups on file.           Subjective:   HPI- no improvement. Feels SOB. Sat normal. No palp, HA, visual issues.  Only taking benzo. Refusing psych.     Inpatient course: Discharge summary reviewed- see chart.    Interval history/Current status: poor, cachectic    Patient Active Problem List   Diagnosis    MDD (major depressive disorder)    Retinal detachment with retinal defect    Vertigo    Anxiety disorder    Portal vein thrombosis    Intractable abdominal pain    Unable to ambulate    Left upper quadrant abdominal pain    Superior mesenteric vein thrombosis

## 2024-11-01 ENCOUNTER — CARE COORDINATION (OUTPATIENT)
Dept: CARE COORDINATION | Age: 69
End: 2024-11-01

## 2024-11-01 NOTE — CARE COORDINATION
Care Transitions Note    Follow Up Call     Patient Current Location:  Home: [address] [place] [address]    Care Transition Nurse contacted the spouse/partner  by telephone. Verified name and  as identifiers.    Additional needs identified to be addressed with provider   Standard priority: Needs new BH referral d/t Dr Mejia not accepting new patients                  Method of communication with provider: staff message.    Care Summary Note: Spoke to pt's spouse Haider for transitions call. Patient went to PCP on 10/21, was prescribed Ativan 0.5 MG tid prn. Stated she is taking medication, not much of a change. Stated BH referral Dr Mejia is not accepting new patients. Spouse may try a BH clinic in [place]. Will message PCP as well for new referral.     Stated patient tends to chew on food then spit it out. Discussed offering soft diet and Ensure or Boost as options. Stated pt tends to get obsessive with Ensure and will have @ 5 per day and nothing else. Offered dietician referral, declined.     Will follow up for transitions.     Plan of care updates since last contact:  Review of patient management of conditions/medications: Discussed referral for BH, started Ativan, diet issues, declined dietician       Advance Care Planning:   Does patient have an Advance Directive: reviewed and current.    Medication Review:  Medications changed since last call, reviewed today.     Remote Patient Monitoring:  Offered patient enrollment in the Remote Patient Monitoring (RPM) program for in-home monitoring: Patient is not eligible for RPM program because: patient does not have qualifying diagnosis.    Assessments:  Care Transitions Subsequent and Final Call    Subsequent and Final Calls  Do you have any ongoing symptoms?: No  Have your medications changed?: Yes  Patient Reports: Ativan 0.5 MG tid prn  Do you have any questions related to your medications?: No  Do you currently have any active services?: No  Are you

## 2024-11-05 ENCOUNTER — TELEPHONE (OUTPATIENT)
Dept: FAMILY MEDICINE CLINIC | Age: 69
End: 2024-11-05

## 2024-11-05 NOTE — TELEPHONE ENCOUNTER
----- Message from Dr. Carrie Padilla DO sent at 11/4/2024 12:47 PM EST -----  Regarding: RE: new referral for BH  Please call patient.  If she willing to see a different psychiatrist other than Dr. Alexi loera?  ----- Message -----  From: Radha Eastman RN  Sent: 11/1/2024   3:42 PM EST  To: Carrie Padilla DO; Francia Anthony RN  Subject: new referral for BH                              Spouse said BH referral to Dr Mejia not taking new patients, needs new referral please.   Thank you

## 2024-11-12 ENCOUNTER — CARE COORDINATION (OUTPATIENT)
Dept: CARE COORDINATION | Age: 69
End: 2024-11-12

## 2024-11-12 NOTE — CARE COORDINATION
Care Transitions Note    Final Call     Patient Current Location:  Home: [facility] [address] [place] [address]    Care Transition Nurse contacted the spouse/partner  Haider , (PHI form verified; on file) by telephone.     Patient graduated from the Care Transitions program on 11/12/24.  Patient/family progressing towards self management.  Reinforced resources provided during this care transition period..      Advance Care Planning:   Does patient have an Advance Directive: reviewed during previous call, see note.     Handoff:   Patient was not referred to the ACM team due to no additional needs identified.       Care Summary Note:   -Patient's  does feel the ativan is beginning to help somewhat.  -Haider inquired regarding BH referral.  CTN informed patient's  that PCP left message on 11/5/24 as per EMR.  Haider verbalized understanding and will call office.  Juileth aware of below 11/21/24 PCP appointment.  -Patient's  denies any further needs at this time.  -CTN to sign off as care transition program ends in 3 days.      Assessments:  Care Transitions Subsequent and Final Call    Subsequent and Final Calls  Do you have any ongoing symptoms?: No  Have your medications changed?: No  Do you have any questions related to your medications?: No  Do you currently have any active services?: No  Are you currently active with any services?: Home Health  Identified Barriers: Stress  Care Transitions Interventions    Pharmacist: Completed      Registered Dietician: Completed    Other Interventions:              Upcoming Appointments:    Future Appointments         Provider Specialty Dept Phone    11/21/2024 10:00 AM Carrie Neal DO Family Medicine [phone]            Patient has agreed to contact primary care provider and/or specialist for any further questions, concerns, or needs.    Francia Anthony RN

## 2024-11-18 ENCOUNTER — TELEPHONE (OUTPATIENT)
Dept: FAMILY MEDICINE CLINIC | Age: 69
End: 2024-11-18

## 2024-11-18 NOTE — TELEPHONE ENCOUNTER
----- Message from Samantha RAYA sent at 11/18/2024 10:03 AM EST -----  Regarding: E[facility] Referral Request  E[facility] Referral Request    Reason for referral request: Specialty Provider    Specialist/Lab/Test patient is requesting (if known): Psychiatrist     Specialist Phone Number (if applicable):    Additional Information Patients  is requesting a psychiatrist for his wife  to    Carrie Neal DO      --------------------------------------------------------------------------------------------------------------------------    Relationship to Patient:  [employer]     Call Back Information: OK to leave message on voicemail  Preferred Call Back Number: Phone   [phone] (home)

## 2024-11-21 ENCOUNTER — TELEPHONE (OUTPATIENT)
Dept: FAMILY MEDICINE CLINIC | Age: 69
End: 2024-11-21

## 2024-11-21 NOTE — TELEPHONE ENCOUNTER
PT's  came in asking about a psychiatrist referral doctor had mentioned at a previous appointment. They spoke with [facility] and were told they would get a call back and never did.   Please call [phone]

## 2024-11-29 DIAGNOSIS — F23 BRIEF PSYCHOTIC DISORDER (HCC): ICD-10-CM

## 2024-11-29 DIAGNOSIS — R62.7 FAILURE TO THRIVE IN ADULT: ICD-10-CM

## 2024-11-29 DIAGNOSIS — F32.1 CURRENT MODERATE EPISODE OF MAJOR DEPRESSIVE DISORDER, UNSPECIFIED WHETHER RECURRENT (HCC): Primary | ICD-10-CM

## 2024-12-06 ENCOUNTER — TELEPHONE (OUTPATIENT)
Dept: FAMILY MEDICINE CLINIC | Age: 69
End: 2024-12-06

## 2025-02-24 ENCOUNTER — TELEPHONE (OUTPATIENT)
Dept: FAMILY MEDICINE CLINIC | Age: 70
End: 2025-02-24

## 2025-03-05 NOTE — TELEPHONE ENCOUNTER
PT's  called to request a jury duty excuse   Fax [phone]  
Printed and faxed  
I have personally provided the amount of critical care time documented below concurrently with the resident/fellow.  This time excludes time spent on separate procedures and time spent teaching. I have reviewed the resident’s/fellow’s documentation and I agree with the assessment and plan of care

## (undated) DEVICE — TISSUE RETRIEVAL SYSTEM: Brand: INZII RETRIEVAL SYSTEM

## (undated) DEVICE — ANCHOR TISSUE RETRIEVAL SYSTEM, BAG SIZE 175 ML, PORT SIZE 10 MM: Brand: ANCHOR TISSUE RETRIEVAL SYSTEM

## (undated) DEVICE — MONOPOLAR CURVED SCISSORS: Brand: ENDOWRIST

## (undated) DEVICE — GLOVE SURG SZ 65 THK91MIL LTX FREE SYN POLYISOPRENE

## (undated) DEVICE — APPLICATOR ENDOSCP L34CM W/ S STL CANN PLAS OBT STYL FOR

## (undated) DEVICE — MEDIUM-LARGE CLIP APPLIER: Brand: ENDOWRIST

## (undated) DEVICE — KIT,ANTI FOG,W/SPONGE & FLUID,SOFT PACK: Brand: MEDLINE

## (undated) DEVICE — SEAL

## (undated) DEVICE — GLOVE SURG SZ 7 L12IN FNGR THK79MIL GRN LTX FREE

## (undated) DEVICE — GARMENT,MEDLINE,DVT,INT,CALF,MED, GEN2: Brand: MEDLINE

## (undated) DEVICE — ELECTRODE PT RET AD L9FT HI MOIST COND ADH HYDRGEL CORDED

## (undated) DEVICE — BLADELESS OBTURATOR: Brand: WECK VISTA

## (undated) DEVICE — INSUFFLATION NEEDLE TO ESTABLISH PNEUMOPERITONEUM.: Brand: INSUFFLATION NEEDLE

## (undated) DEVICE — FENESTRATED BIPOLAR FORCEPS: Brand: ENDOWRIST

## (undated) DEVICE — [HIGH FLOW INSUFFLATOR,  DO NOT USE IF PACKAGE IS DAMAGED,  KEEP DRY,  KEEP AWAY FROM SUNLIGHT,  PROTECT FROM HEAT AND RADIOACTIVE SOURCES.]: Brand: PNEUMOSURE

## (undated) DEVICE — TIP COVER ACCESSORY

## (undated) DEVICE — SOLUTION IRRIG 3000ML 0.9% SOD CHL USP UROMATIC PLAS CONT

## (undated) DEVICE — PROGRASP FORCEPS: Brand: ENDOWRIST

## (undated) DEVICE — 4-PORT MANIFOLD: Brand: NEPTUNE 2

## (undated) DEVICE — PUMP SUC IRR TBNG L10FT W/ HNDPC ASSEMB STRYKEFLOW 2

## (undated) DEVICE — SCISSORS SURG DIA8MM MPLR CRV ENDOWRIST

## (undated) DEVICE — COLUMN DRAPE

## (undated) DEVICE — NEEDLE CLOSURE OMNICLOSE

## (undated) DEVICE — DRAPE,LAP,CHOLE,W/TROUGHS,STERILE: Brand: MEDLINE

## (undated) DEVICE — PLUMEPORT ACTIV LAPAROSCOPIC SMOKE FILTRATION DEVICE: Brand: PLUMEPORT ACTIVE

## (undated) DEVICE — TROCAR: Brand: KII FIOS FIRST ENTRY

## (undated) DEVICE — LIQUIBAND RAPID ADHESIVE 36/CS 0.8ML: Brand: MEDLINE

## (undated) DEVICE — ARM DRAPE

## (undated) DEVICE — ROBOTIC: Brand: MEDLINE INDUSTRIES, INC.

## (undated) DEVICE — DRAPE,REIN 53X77,STERILE: Brand: MEDLINE

## (undated) DEVICE — DRAPE,UTILTY,TAPE,15X26, 4EA/PK: Brand: MEDLINE

## (undated) DEVICE — CLEANER LENS C-CLEAR

## (undated) DEVICE — 3M™ IOBAN™ 2 ANTIMICROBIAL INCISE DRAPE 6650EZ: Brand: IOBAN™ 2